# Patient Record
Sex: MALE | Race: WHITE | HISPANIC OR LATINO | Employment: FULL TIME | URBAN - METROPOLITAN AREA
[De-identification: names, ages, dates, MRNs, and addresses within clinical notes are randomized per-mention and may not be internally consistent; named-entity substitution may affect disease eponyms.]

---

## 2018-11-22 ENCOUNTER — HOSPITAL ENCOUNTER (OUTPATIENT)
Dept: RADIOLOGY | Facility: MEDICAL CENTER | Age: 63
End: 2018-11-22

## 2018-11-22 ENCOUNTER — HOSPITAL ENCOUNTER (INPATIENT)
Facility: MEDICAL CENTER | Age: 63
LOS: 1 days | DRG: 069 | End: 2018-11-23
Attending: EMERGENCY MEDICINE | Admitting: FAMILY MEDICINE
Payer: COMMERCIAL

## 2018-11-22 ENCOUNTER — APPOINTMENT (OUTPATIENT)
Dept: RADIOLOGY | Facility: MEDICAL CENTER | Age: 63
DRG: 069 | End: 2018-11-22
Attending: EMERGENCY MEDICINE
Payer: COMMERCIAL

## 2018-11-22 PROBLEM — I10 HTN (HYPERTENSION): Status: ACTIVE | Noted: 2018-11-22

## 2018-11-22 PROBLEM — R27.0 ATAXIA: Status: ACTIVE | Noted: 2018-11-22

## 2018-11-22 LAB
ANION GAP SERPL CALC-SCNC: 4 MMOL/L (ref 0–11.9)
APTT PPP: 35.7 SEC (ref 24.7–36)
BASOPHILS # BLD AUTO: 0.7 % (ref 0–1.8)
BASOPHILS # BLD: 0.06 K/UL (ref 0–0.12)
BUN SERPL-MCNC: 12 MG/DL (ref 8–22)
CALCIUM SERPL-MCNC: 8.3 MG/DL (ref 8.5–10.5)
CHLORIDE SERPL-SCNC: 106 MMOL/L (ref 96–112)
CO2 SERPL-SCNC: 25 MMOL/L (ref 20–33)
CREAT SERPL-MCNC: 0.88 MG/DL (ref 0.5–1.4)
EOSINOPHIL # BLD AUTO: 0.05 K/UL (ref 0–0.51)
EOSINOPHIL NFR BLD: 0.6 % (ref 0–6.9)
ERYTHROCYTE [DISTWIDTH] IN BLOOD BY AUTOMATED COUNT: 46.8 FL (ref 35.9–50)
GLUCOSE BLD-MCNC: 88 MG/DL (ref 65–99)
GLUCOSE BLD-MCNC: <10 MG/DL (ref 65–99)
GLUCOSE SERPL-MCNC: 104 MG/DL (ref 65–99)
HCT VFR BLD AUTO: 45.6 % (ref 42–52)
HGB BLD-MCNC: 15.2 G/DL (ref 14–18)
IMM GRANULOCYTES # BLD AUTO: 0.04 K/UL (ref 0–0.11)
IMM GRANULOCYTES NFR BLD AUTO: 0.5 % (ref 0–0.9)
INR PPP: 1.3 (ref 0.87–1.13)
LYMPHOCYTES # BLD AUTO: 2.1 K/UL (ref 1–4.8)
LYMPHOCYTES NFR BLD: 24.6 % (ref 22–41)
MCH RBC QN AUTO: 31.7 PG (ref 27–33)
MCHC RBC AUTO-ENTMCNC: 33.3 G/DL (ref 33.7–35.3)
MCV RBC AUTO: 95 FL (ref 81.4–97.8)
MONOCYTES # BLD AUTO: 0.4 K/UL (ref 0–0.85)
MONOCYTES NFR BLD AUTO: 4.7 % (ref 0–13.4)
NEUTROPHILS # BLD AUTO: 5.88 K/UL (ref 1.82–7.42)
NEUTROPHILS NFR BLD: 68.9 % (ref 44–72)
NRBC # BLD AUTO: 0 K/UL
NRBC BLD-RTO: 0 /100 WBC
PLATELET # BLD AUTO: 193 K/UL (ref 164–446)
PMV BLD AUTO: 11.7 FL (ref 9–12.9)
POTASSIUM SERPL-SCNC: 4.7 MMOL/L (ref 3.6–5.5)
PROTHROMBIN TIME: 16.3 SEC (ref 12–14.6)
RBC # BLD AUTO: 4.8 M/UL (ref 4.7–6.1)
SODIUM SERPL-SCNC: 135 MMOL/L (ref 135–145)
WBC # BLD AUTO: 8.5 K/UL (ref 4.8–10.8)

## 2018-11-22 PROCEDURE — 70498 CT ANGIOGRAPHY NECK: CPT

## 2018-11-22 PROCEDURE — 82962 GLUCOSE BLOOD TEST: CPT | Mod: 91

## 2018-11-22 PROCEDURE — 99285 EMERGENCY DEPT VISIT HI MDM: CPT

## 2018-11-22 PROCEDURE — 85610 PROTHROMBIN TIME: CPT

## 2018-11-22 PROCEDURE — 85730 THROMBOPLASTIN TIME PARTIAL: CPT

## 2018-11-22 PROCEDURE — 99223 1ST HOSP IP/OBS HIGH 75: CPT | Performed by: FAMILY MEDICINE

## 2018-11-22 PROCEDURE — 700117 HCHG RX CONTRAST REV CODE 255: Performed by: EMERGENCY MEDICINE

## 2018-11-22 PROCEDURE — 0042T CT-CEREBRAL PERFUSION ANALYSIS: CPT

## 2018-11-22 PROCEDURE — 85025 COMPLETE CBC W/AUTO DIFF WBC: CPT

## 2018-11-22 PROCEDURE — 93005 ELECTROCARDIOGRAM TRACING: CPT

## 2018-11-22 PROCEDURE — 70496 CT ANGIOGRAPHY HEAD: CPT

## 2018-11-22 PROCEDURE — 770020 HCHG ROOM/CARE - TELE (206)

## 2018-11-22 PROCEDURE — 80048 BASIC METABOLIC PNL TOTAL CA: CPT

## 2018-11-22 PROCEDURE — 36415 COLL VENOUS BLD VENIPUNCTURE: CPT

## 2018-11-22 RX ORDER — POLYETHYLENE GLYCOL 3350 17 G/17G
1 POWDER, FOR SOLUTION ORAL
Status: DISCONTINUED | OUTPATIENT
Start: 2018-11-22 | End: 2018-11-23 | Stop reason: HOSPADM

## 2018-11-22 RX ORDER — TAMSULOSIN HYDROCHLORIDE 0.4 MG/1
0.4 CAPSULE ORAL
Status: DISCONTINUED | OUTPATIENT
Start: 2018-11-23 | End: 2018-11-23 | Stop reason: HOSPADM

## 2018-11-22 RX ORDER — AMOXICILLIN 250 MG
2 CAPSULE ORAL 2 TIMES DAILY
Status: DISCONTINUED | OUTPATIENT
Start: 2018-11-22 | End: 2018-11-23 | Stop reason: HOSPADM

## 2018-11-22 RX ORDER — TAMSULOSIN HYDROCHLORIDE 0.4 MG/1
0.8 CAPSULE ORAL EVERY EVENING
COMMUNITY
End: 2021-06-30

## 2018-11-22 RX ORDER — BISACODYL 10 MG
10 SUPPOSITORY, RECTAL RECTAL
Status: DISCONTINUED | OUTPATIENT
Start: 2018-11-22 | End: 2018-11-23 | Stop reason: HOSPADM

## 2018-11-22 RX ORDER — HYDRALAZINE HYDROCHLORIDE 20 MG/ML
10 INJECTION INTRAMUSCULAR; INTRAVENOUS EVERY 6 HOURS PRN
Status: DISCONTINUED | OUTPATIENT
Start: 2018-11-22 | End: 2018-11-23 | Stop reason: HOSPADM

## 2018-11-22 RX ORDER — ATORVASTATIN CALCIUM 80 MG/1
80 TABLET, FILM COATED ORAL EVERY EVENING
Status: DISCONTINUED | OUTPATIENT
Start: 2018-11-22 | End: 2018-11-23 | Stop reason: HOSPADM

## 2018-11-22 RX ORDER — ASPIRIN 325 MG
325 TABLET ORAL DAILY
Status: DISCONTINUED | OUTPATIENT
Start: 2018-11-22 | End: 2018-11-23 | Stop reason: HOSPADM

## 2018-11-22 RX ADMIN — IOHEXOL 120 ML: 350 INJECTION, SOLUTION INTRAVENOUS at 20:46

## 2018-11-23 ENCOUNTER — APPOINTMENT (OUTPATIENT)
Dept: CARDIOLOGY | Facility: MEDICAL CENTER | Age: 63
DRG: 069 | End: 2018-11-23
Attending: INTERNAL MEDICINE
Payer: COMMERCIAL

## 2018-11-23 ENCOUNTER — APPOINTMENT (OUTPATIENT)
Dept: RADIOLOGY | Facility: MEDICAL CENTER | Age: 63
DRG: 069 | End: 2018-11-23
Attending: FAMILY MEDICINE
Payer: COMMERCIAL

## 2018-11-23 ENCOUNTER — PATIENT OUTREACH (OUTPATIENT)
Dept: HEALTH INFORMATION MANAGEMENT | Facility: OTHER | Age: 63
End: 2018-11-23

## 2018-11-23 VITALS
HEIGHT: 67 IN | OXYGEN SATURATION: 100 % | HEART RATE: 59 BPM | WEIGHT: 158.51 LBS | SYSTOLIC BLOOD PRESSURE: 134 MMHG | BODY MASS INDEX: 24.88 KG/M2 | RESPIRATION RATE: 18 BRPM | DIASTOLIC BLOOD PRESSURE: 54 MMHG | TEMPERATURE: 97.8 F

## 2018-11-23 PROBLEM — R27.0 ATAXIA: Status: RESOLVED | Noted: 2018-11-22 | Resolved: 2018-11-23

## 2018-11-23 LAB
ALBUMIN SERPL BCP-MCNC: 3.7 G/DL (ref 3.2–4.9)
ALBUMIN/GLOB SERPL: 1.8 G/DL
ALP SERPL-CCNC: 65 U/L (ref 30–99)
ALT SERPL-CCNC: 9 U/L (ref 2–50)
ANION GAP SERPL CALC-SCNC: 6 MMOL/L (ref 0–11.9)
AST SERPL-CCNC: 9 U/L (ref 12–45)
BASOPHILS # BLD AUTO: 0.7 % (ref 0–1.8)
BASOPHILS # BLD: 0.05 K/UL (ref 0–0.12)
BILIRUB SERPL-MCNC: 0.6 MG/DL (ref 0.1–1.5)
BUN SERPL-MCNC: 12 MG/DL (ref 8–22)
CALCIUM SERPL-MCNC: 8.7 MG/DL (ref 8.5–10.5)
CHLORIDE SERPL-SCNC: 107 MMOL/L (ref 96–112)
CHOLEST SERPL-MCNC: 158 MG/DL (ref 100–199)
CO2 SERPL-SCNC: 26 MMOL/L (ref 20–33)
CREAT SERPL-MCNC: 0.92 MG/DL (ref 0.5–1.4)
EOSINOPHIL # BLD AUTO: 0.19 K/UL (ref 0–0.51)
EOSINOPHIL NFR BLD: 2.7 % (ref 0–6.9)
ERYTHROCYTE [DISTWIDTH] IN BLOOD BY AUTOMATED COUNT: 48.1 FL (ref 35.9–50)
EST. AVERAGE GLUCOSE BLD GHB EST-MCNC: 120 MG/DL
FOLATE SERPL-MCNC: 10.5 NG/ML
GLOBULIN SER CALC-MCNC: 2.1 G/DL (ref 1.9–3.5)
GLUCOSE SERPL-MCNC: 102 MG/DL (ref 65–99)
HBA1C MFR BLD: 5.8 % (ref 0–5.6)
HCT VFR BLD AUTO: 43.7 % (ref 42–52)
HDLC SERPL-MCNC: 43 MG/DL
HGB BLD-MCNC: 14.4 G/DL (ref 14–18)
IMM GRANULOCYTES # BLD AUTO: 0.02 K/UL (ref 0–0.11)
IMM GRANULOCYTES NFR BLD AUTO: 0.3 % (ref 0–0.9)
LDLC SERPL CALC-MCNC: 94 MG/DL
LV EJECT FRACT  99904: 70
LV EJECT FRACT MOD 2C 99903: 75.51
LV EJECT FRACT MOD 4C 99902: 87.15
LV EJECT FRACT MOD BP 99901: 82.45
LYMPHOCYTES # BLD AUTO: 2.61 K/UL (ref 1–4.8)
LYMPHOCYTES NFR BLD: 36.9 % (ref 22–41)
MCH RBC QN AUTO: 32 PG (ref 27–33)
MCHC RBC AUTO-ENTMCNC: 33 G/DL (ref 33.7–35.3)
MCV RBC AUTO: 97.1 FL (ref 81.4–97.8)
MONOCYTES # BLD AUTO: 0.58 K/UL (ref 0–0.85)
MONOCYTES NFR BLD AUTO: 8.2 % (ref 0–13.4)
NEUTROPHILS # BLD AUTO: 3.63 K/UL (ref 1.82–7.42)
NEUTROPHILS NFR BLD: 51.2 % (ref 44–72)
NRBC # BLD AUTO: 0 K/UL
NRBC BLD-RTO: 0 /100 WBC
PLATELET # BLD AUTO: 173 K/UL (ref 164–446)
PMV BLD AUTO: 11.2 FL (ref 9–12.9)
POTASSIUM SERPL-SCNC: 4.3 MMOL/L (ref 3.6–5.5)
PROT SERPL-MCNC: 5.8 G/DL (ref 6–8.2)
RBC # BLD AUTO: 4.5 M/UL (ref 4.7–6.1)
SODIUM SERPL-SCNC: 139 MMOL/L (ref 135–145)
TRIGL SERPL-MCNC: 105 MG/DL (ref 0–149)
VIT B12 SERPL-MCNC: 259 PG/ML (ref 211–911)
WBC # BLD AUTO: 7.1 K/UL (ref 4.8–10.8)

## 2018-11-23 PROCEDURE — A9270 NON-COVERED ITEM OR SERVICE: HCPCS | Performed by: FAMILY MEDICINE

## 2018-11-23 PROCEDURE — A9585 GADOBUTROL INJECTION: HCPCS | Performed by: FAMILY MEDICINE

## 2018-11-23 PROCEDURE — 700117 HCHG RX CONTRAST REV CODE 255: Performed by: FAMILY MEDICINE

## 2018-11-23 PROCEDURE — 3E02340 INTRODUCTION OF INFLUENZA VACCINE INTO MUSCLE, PERCUTANEOUS APPROACH: ICD-10-PCS | Performed by: FAMILY MEDICINE

## 2018-11-23 PROCEDURE — 82607 VITAMIN B-12: CPT

## 2018-11-23 PROCEDURE — 93306 TTE W/DOPPLER COMPLETE: CPT

## 2018-11-23 PROCEDURE — G8979 MOBILITY GOAL STATUS: HCPCS | Mod: CI

## 2018-11-23 PROCEDURE — 700111 HCHG RX REV CODE 636 W/ 250 OVERRIDE (IP): Performed by: STUDENT IN AN ORGANIZED HEALTH CARE EDUCATION/TRAINING PROGRAM

## 2018-11-23 PROCEDURE — G8980 MOBILITY D/C STATUS: HCPCS | Mod: CI

## 2018-11-23 PROCEDURE — 97161 PT EVAL LOW COMPLEX 20 MIN: CPT

## 2018-11-23 PROCEDURE — G8988 SELF CARE GOAL STATUS: HCPCS | Mod: CI

## 2018-11-23 PROCEDURE — 700102 HCHG RX REV CODE 250 W/ 637 OVERRIDE(OP): Performed by: FAMILY MEDICINE

## 2018-11-23 PROCEDURE — G8987 SELF CARE CURRENT STATUS: HCPCS | Mod: CI

## 2018-11-23 PROCEDURE — 90471 IMMUNIZATION ADMIN: CPT

## 2018-11-23 PROCEDURE — 36415 COLL VENOUS BLD VENIPUNCTURE: CPT

## 2018-11-23 PROCEDURE — 700111 HCHG RX REV CODE 636 W/ 250 OVERRIDE (IP): Performed by: FAMILY MEDICINE

## 2018-11-23 PROCEDURE — 3E0234Z INTRODUCTION OF SERUM, TOXOID AND VACCINE INTO MUSCLE, PERCUTANEOUS APPROACH: ICD-10-PCS | Performed by: FAMILY MEDICINE

## 2018-11-23 PROCEDURE — G8978 MOBILITY CURRENT STATUS: HCPCS | Mod: CI

## 2018-11-23 PROCEDURE — 80061 LIPID PANEL: CPT

## 2018-11-23 PROCEDURE — 99239 HOSP IP/OBS DSCHRG MGMT >30: CPT | Performed by: INTERNAL MEDICINE

## 2018-11-23 PROCEDURE — 90670 PCV13 VACCINE IM: CPT | Performed by: FAMILY MEDICINE

## 2018-11-23 PROCEDURE — 90662 IIV NO PRSV INCREASED AG IM: CPT | Performed by: FAMILY MEDICINE

## 2018-11-23 PROCEDURE — 85025 COMPLETE CBC W/AUTO DIFF WBC: CPT

## 2018-11-23 PROCEDURE — 99221 1ST HOSP IP/OBS SF/LOW 40: CPT | Performed by: PSYCHIATRY & NEUROLOGY

## 2018-11-23 PROCEDURE — 93306 TTE W/DOPPLER COMPLETE: CPT | Mod: 26 | Performed by: INTERNAL MEDICINE

## 2018-11-23 PROCEDURE — G8989 SELF CARE D/C STATUS: HCPCS | Mod: CI

## 2018-11-23 PROCEDURE — 82746 ASSAY OF FOLIC ACID SERUM: CPT

## 2018-11-23 PROCEDURE — 97165 OT EVAL LOW COMPLEX 30 MIN: CPT

## 2018-11-23 PROCEDURE — 83036 HEMOGLOBIN GLYCOSYLATED A1C: CPT

## 2018-11-23 PROCEDURE — 80053 COMPREHEN METABOLIC PANEL: CPT

## 2018-11-23 PROCEDURE — 70553 MRI BRAIN STEM W/O & W/DYE: CPT

## 2018-11-23 RX ORDER — CYANOCOBALAMIN 1000 UG/ML
1000 INJECTION, SOLUTION INTRAMUSCULAR; SUBCUTANEOUS
Status: DISCONTINUED | OUTPATIENT
Start: 2018-11-23 | End: 2018-11-23 | Stop reason: HOSPADM

## 2018-11-23 RX ORDER — ATORVASTATIN CALCIUM 80 MG/1
80 TABLET, FILM COATED ORAL EVERY EVENING
Qty: 30 TAB | Refills: 1 | Status: SHIPPED | OUTPATIENT
Start: 2018-11-23 | End: 2019-05-10

## 2018-11-23 RX ORDER — ASPIRIN 81 MG/1
81 TABLET, CHEWABLE ORAL DAILY
Qty: 30 TAB | Refills: 3 | Status: SHIPPED | OUTPATIENT
Start: 2018-11-23 | End: 2019-05-10

## 2018-11-23 RX ORDER — ATORVASTATIN CALCIUM 80 MG/1
20 TABLET, FILM COATED ORAL EVERY EVENING
Qty: 30 TAB | Refills: 1 | Status: SHIPPED | OUTPATIENT
Start: 2018-11-23 | End: 2018-11-23

## 2018-11-23 RX ORDER — CLOPIDOGREL BISULFATE 75 MG/1
75 TABLET ORAL DAILY
Qty: 21 TAB | Refills: 0 | Status: SHIPPED | OUTPATIENT
Start: 2018-11-23 | End: 2018-12-14

## 2018-11-23 RX ORDER — GADOBUTROL 604.72 MG/ML
7.5 INJECTION INTRAVENOUS ONCE
Status: COMPLETED | OUTPATIENT
Start: 2018-11-23 | End: 2018-11-23

## 2018-11-23 RX ADMIN — ATORVASTATIN CALCIUM 80 MG: 80 TABLET, FILM COATED ORAL at 17:23

## 2018-11-23 RX ADMIN — PNEUMOCOCCAL 13-VALENT CONJUGATE VACCINE 0.5 ML: 2.2; 2.2; 2.2; 2.2; 2.2; 4.4; 2.2; 2.2; 2.2; 2.2; 2.2; 2.2; 2.2 INJECTION, SUSPENSION INTRAMUSCULAR at 17:05

## 2018-11-23 RX ADMIN — CYANOCOBALAMIN 1000 MCG: 1000 INJECTION INTRAMUSCULAR; SUBCUTANEOUS at 17:05

## 2018-11-23 RX ADMIN — INFLUENZA A VIRUS A/MICHIGAN/45/2015 X-275 (H1N1) ANTIGEN (FORMALDEHYDE INACTIVATED), INFLUENZA A VIRUS A/SINGAPORE/INFIMH-16-0019/2016 IVR-186 (H3N2) ANTIGEN (FORMALDEHYDE INACTIVATED), AND INFLUENZA B VIRUS B/MARYLAND/15/2016 BX-69A (A B/COLORADO/6/2017-LIKE VIRUS) ANTIGEN (FORMALDEHYDE INACTIVATED) 0.5 ML: 60; 60; 60 INJECTION, SUSPENSION INTRAMUSCULAR at 17:07

## 2018-11-23 RX ADMIN — TAMSULOSIN HYDROCHLORIDE 0.4 MG: 0.4 CAPSULE ORAL at 08:53

## 2018-11-23 RX ADMIN — ASPIRIN 325 MG: 325 TABLET, COATED ORAL at 00:36

## 2018-11-23 RX ADMIN — GADOBUTROL 7.5 ML: 604.72 INJECTION INTRAVENOUS at 09:44

## 2018-11-23 RX ADMIN — ATORVASTATIN CALCIUM 80 MG: 80 TABLET, FILM COATED ORAL at 00:36

## 2018-11-23 ASSESSMENT — PAIN SCALES - GENERAL
PAINLEVEL_OUTOF10: 0

## 2018-11-23 ASSESSMENT — COGNITIVE AND FUNCTIONAL STATUS - GENERAL
DAILY ACTIVITIY SCORE: 24
SUGGESTED CMS G CODE MODIFIER MOBILITY: CI
SUGGESTED CMS G CODE MODIFIER MOBILITY: CI
CLIMB 3 TO 5 STEPS WITH RAILING: A LITTLE
SUGGESTED CMS G CODE MODIFIER DAILY ACTIVITY: CH
SUGGESTED CMS G CODE MODIFIER DAILY ACTIVITY: CH
MOBILITY SCORE: 23
CLIMB 3 TO 5 STEPS WITH RAILING: A LITTLE
DAILY ACTIVITIY SCORE: 24
MOBILITY SCORE: 23

## 2018-11-23 ASSESSMENT — LIFESTYLE VARIABLES
TOTAL SCORE: 1
HAVE PEOPLE ANNOYED YOU BY CRITICIZING YOUR DRINKING: NO
ON A TYPICAL DAY WHEN YOU DRINK ALCOHOL HOW MANY DRINKS DO YOU HAVE: 3
ALCOHOL_USE: YES
HAVE YOU EVER FELT YOU SHOULD CUT DOWN ON YOUR DRINKING: YES
CONSUMPTION TOTAL: NEGATIVE
EVER FELT BAD OR GUILTY ABOUT YOUR DRINKING: NO
TOTAL SCORE: 1
HOW MANY TIMES IN THE PAST YEAR HAVE YOU HAD 5 OR MORE DRINKS IN A DAY: 0
EVER HAD A DRINK FIRST THING IN THE MORNING TO STEADY YOUR NERVES TO GET RID OF A HANGOVER: NO
AVERAGE NUMBER OF DAYS PER WEEK YOU HAVE A DRINK CONTAINING ALCOHOL: 1
EVER_SMOKED: YES
TOTAL SCORE: 1

## 2018-11-23 ASSESSMENT — COPD QUESTIONNAIRES
DURING THE PAST 4 WEEKS HOW MUCH DID YOU FEEL SHORT OF BREATH: NONE/LITTLE OF THE TIME
IN THE PAST 12 MONTHS DO YOU DO LESS THAN YOU USED TO BECAUSE OF YOUR BREATHING PROBLEMS: DISAGREE/UNSURE
HAVE YOU SMOKED AT LEAST 100 CIGARETTES IN YOUR ENTIRE LIFE: YES
COPD SCREENING SCORE: 4
DO YOU EVER COUGH UP ANY MUCUS OR PHLEGM?: NO/ONLY WITH OCCASIONAL COLDS OR INFECTIONS

## 2018-11-23 ASSESSMENT — GAIT ASSESSMENTS
DISTANCE (FEET): 200
GAIT LEVEL OF ASSIST: SUPERVISED

## 2018-11-23 ASSESSMENT — PATIENT HEALTH QUESTIONNAIRE - PHQ9
SUM OF ALL RESPONSES TO PHQ9 QUESTIONS 1 AND 2: 0
1. LITTLE INTEREST OR PLEASURE IN DOING THINGS: NOT AT ALL
SUM OF ALL RESPONSES TO PHQ9 QUESTIONS 1 AND 2: 0
2. FEELING DOWN, DEPRESSED, IRRITABLE, OR HOPELESS: NOT AT ALL
2. FEELING DOWN, DEPRESSED, IRRITABLE, OR HOPELESS: NOT AT ALL

## 2018-11-23 ASSESSMENT — ACTIVITIES OF DAILY LIVING (ADL): TOILETING: INDEPENDENT

## 2018-11-23 NOTE — ED NOTES
Med Rec Updated and Complete per Pt at bedside  Allergies Reviewed  No PO ABX last 30 days    Pt states he is only taking Flomax at this time.

## 2018-11-23 NOTE — CONSULTS
"Date of Admission: 11/22/2018    Neurology Consult Note     Reason for consultation: Ataxia     HPI:  Mr. Sheldon is a very pleasant 64 yo old male with PMHx of BPH on flomax who presents to ED as a transfer from an outside hospital for ataxia. Per patient he started to develop unsteady gait and tendency to fall/sway to the left yesterday morning (11/22), he had associated dizziness described as \"feeling like the ground was moving underneath him\" he denies a spinning sensation or a feeling of lightheadedness, this continued most of the day for which he then presented to the ED. He does note that this has happened several times over the last year but is usually very transient and does not last more than a few minutes. He denies any headaches, vision changes, changes in his hearing (although notes poor hearing), sensation of ear fullness or ringing. He denies chest pain, palpitations, loss of motor or sensory.     S: Mr Sheldon is feeling back to his baseline today, denies any further ataxia or unsteadiness, was able to ambulate to the restroom this morning without issue.     O- Vitals stable, although mildly bradycardic. Labs have been relatively unremarkable. CTA head/neck, CT head all unremarkable without evidence of  Significant stenosis, hemorrhage, areas of ischemia or infarct.       Allergies: No Known Allergies      Current Facility-Administered Medications:   •  Influenza Vaccine High-Dose pf injection 0.5 mL, 0.5 mL, Intramuscular, Once PRN, Jimmie Aly M.D.  •  pneumococcal 13-Leidy Conj Vacc (PREVNAR 13) syringe 0.5 mL, 0.5 mL, Intramuscular, Once PRN, Jimmie Aly M.D.  •  tamsulosin (FLOMAX) capsule 0.4 mg, 0.4 mg, Oral, AFTER BREAKFAST, Jimmie Aly M.D., 0.4 mg at 11/23/18 0853  •  senna-docusate (PERICOLACE or SENOKOT S) 8.6-50 MG per tablet 2 Tab, 2 Tab, Oral, BID, Stopped at 11/23/18 0830 **AND** polyethylene glycol/lytes (MIRALAX) PACKET 1 Packet, 1 Packet, Oral, QDAY " PRN **AND** magnesium hydroxide (MILK OF MAGNESIA) suspension 30 mL, 30 mL, Oral, QDAY PRN **AND** bisacodyl (DULCOLAX) suppository 10 mg, 10 mg, Rectal, QDAY PRN, Jimmie Aly M.D.  •  aspirin (ASA) tablet 325 mg, 325 mg, Oral, DAILY, Jimmie Aly M.D., 325 mg at 11/23/18 0036  •  atorvastatin (LIPITOR) tablet 80 mg, 80 mg, Oral, Q EVENING, Jimmie Aly M.D., 80 mg at 11/23/18 0036  •  hydrALAZINE (APRESOLINE) injection 10 mg, 10 mg, Intravenous, Q6HRS PRN, Jimmie Aly M.D.      PHYSICAL EXAM    Vitals:    11/22/18 2230 11/22/18 2320 11/23/18 0024 11/23/18 0446   BP:   134/61 123/57   Pulse: (!) 54 (!) 59 (!) 57 (!) 52   Resp: 15 15 16 16   Temp:   36.8 °C (98.3 °F) 37.1 °C (98.7 °F)   TempSrc:   Temporal Temporal   SpO2: 93% 96% 97% 96%   Weight:   71.9 kg (158 lb 8.2 oz)    Height:         General: Non ill appearing male, sitting up eating breakfast, resting comfortably    Head/Neck: NCAT. no meningismus neg kernig neg brudzinski. No bruits.     Skin: Warm, dry, intact. No rashes observed head/neck or body      Mental Status: Awake, alert, oriented x 3. Name/repeat/fluent/command follow intact. No neglect/extinction. Attention and concentration, recent & remote memory, Fund of Knowledge wnl.     Cranial Nerves:  CN II-XII intact. PERRL.   No afferent pupillary defect. EOM full. VF full. No nystagmus.    Motor:  bulk & tone wnl. strength intact. no abn mvmts.       Sensory: symmetric to sharp, decreased proprioception in bilateral feet     Coordination: no dysmetria or dysdiadochokinesia     DTR's: intact/sym. no clonus. Toes mute.    Gait/Station: normal rise from head, normal gait      Labs:  Recent Labs      11/22/18 2024 11/23/18   0209   WBC  8.5  7.1   RBC  4.80  4.50*   HEMOGLOBIN  15.2  14.4   HEMATOCRIT  45.6  43.7   MCV  95.0  97.1   MCH  31.7  32.0   MCHC  33.3*  33.0*   RDW  46.8  48.1   PLATELETCT  193  173   MPV  11.7  11.2     Recent Labs      11/22/18   2100   11/23/18   0209   SODIUM  135  139   POTASSIUM  4.7  4.3   CHLORIDE  106  107   CO2  25  26   GLUCOSE  104*  102*   BUN  12  12   CREATININE  0.88  0.92   CALCIUM  8.3*  8.7     Recent Labs      11/22/18 2051   APTT  35.7   INR  1.30*             Recent Labs      11/23/18 0209   TRIGLYCERIDE  105   HDL  43   LDL  94     Recent Labs      11/22/18   2100  11/23/18 0209   SODIUM  135  139   POTASSIUM  4.7  4.3   CHLORIDE  106  107   CO2  25  26   GLUCOSE  104*  102*   BUN  12  12     Recent Labs      11/22/18   2100  11/23/18   0209   SODIUM  135  139   POTASSIUM  4.7  4.3   CHLORIDE  106  107   CO2  25  26   BUN  12  12   CREATININE  0.88  0.92   CALCIUM  8.3*  8.7     Recent Labs      11/22/18 2051   APTT  35.7   INR  1.30*     No results found for this or any previous visit.           Imaging: neuroimaging reviewed and directly visualized by me  CT-CTA NECK WITH & W/O-POST PROCESSING   Final Result      Unremarkable CT angiogram of the neck. No high-grade stenosis, large vessel occlusion, aneurysm or dissection.      CT-CTA HEAD WITH & W/O-POST PROCESS   Final Result      1.  No large vessel occlusion, high-grade stenosis or aneurysm of the [place] of [place].   2.  No CT evidence of acute infarct, hemorrhage or mass.      CT-CEREBRAL PERFUSION ANALYSIS   Final Result      1.  Cerebral blood flow less than 30% likely representing completed infarct = 0 mL.      2.  T Max more than 6 seconds likely representing combination of completed infarct and ischemia = 0 mL.      3.  Mismatched volume likely representing ischemic brain/penumbra = 0 mL.      4.  Please note that the cerebral perfusion was performed on the limited brain tissue around the basal ganglia region. Infarct/ischemia outside the CT perfusion sections can be missed in this study.      OUTSIDE IMAGES-CT HEAD   Final Result      OUTSIDE IMAGES-DX CHEST   Final Result      MR-BRAIN-WITH & W/O    (Results Pending)   EC-ECHOCARDIOGRAM COMPLETE W/ CONT     (Results Pending)       Assessment/Plan:    Mr. Sheldon is a 64 yo male with no significant PMHx aside from BPH who was admitted for ataxia concerning for possible stroke. CT head and CTA head/neck was negative for hemorrhage, ischemic or mass, MRI was completed and negative for evidence of CVA. Symptoms of ataxia possibly secondary to transient ischemic attack (TIA) vs peripheral neuropathy vs vertigo.     1. Vitamin B12 low normal at 259, recommend supplemention with IM Vit B-12 while inpatient, consider oral supplementation on discharge   2. Consider use of pharmacological therapy such as meclizine, metoclopramide for symptomatic control   3. Recommend vestibular rehabilitation if symptoms continue   4. slightly elevated glycohemoglobin at 5.8,  on healthy diet   5. ASCVD score 17.5%, statin therapy recommended, recommend to start moderate/high intensity statin, daily aspirin   6. Recommend 30 day cardiac monitor on discharge to evaluate for arrhythmia         Luisa Chowdhury M.D.  PGY2 Internal Medicine   [employer] [facility] [employer]

## 2018-11-23 NOTE — PROGRESS NOTES
Bedside report received. Pt A&Ox4. No complaints of pain or SOB. RA. VSS. POC discussed with pt. Pt verbalizes understanding. Call light and belongings within reach. Bed locked and in lowest position. Fall precautions in place.

## 2018-11-23 NOTE — ED NOTES
Bedside report to receiving RN for T8. Questions answered. All belongings accounted for at transfer.

## 2018-11-23 NOTE — CARE PLAN
Problem: Communication  Goal: The ability to communicate needs accurately and effectively will improve  Outcome: PROGRESSING AS EXPECTED  Patient has been oriented to call light system and has been able to communicate his needs accurately and effectively during the shift.    Problem: Safety  Goal: Will remain free from falls  Outcome: PROGRESSING AS EXPECTED  The patient has been assessed for fall risks and has remained free from falls during shift.

## 2018-11-23 NOTE — ED TRIAGE NOTES
"Pt BIB EMS from [facility] as a transfer for probable vertigo and need of an MRI. Pt had feelings of weakness around 1000 this morning and L sided lateral pain on his rib area. Pt states \" I wasn't feeling well\". Pt is A&O4 with VSS. Pt feels dizzy and very weak, upon arrival pt ambulated to restroom with one assist with shaky gait. Head CT normal at outside facility.   "

## 2018-11-23 NOTE — ED NOTES
Pt sleeping soundly on side in room. Even and unlabored respirations noted. Pt awaiting placement upstairs.

## 2018-11-23 NOTE — H&P
DATE OF ADMISSION:  11/22/2018    HISTORY OF PRESENT ILLNESS:  This is a 63-year-old male who was transferred   from an outside facility to here today with a complaint of unsteady gait.  The   patient states this started in the morning.  The patient states that he   noticed that he had a tendency to fall towards the left.  Patient states that   he seems to be getting worse, decided to go to the local emergency room.  At   the local emergency room, there was a question of vertigo.  Patient was   transferred to the hospital here.  Patient also denies any slurring of his   speech and denies any weakness in the upper or lower extremities.  However, he   states that whenever he tries to walk, he has a tendency to fall towards the   left and he feels he is like a drunk person.  Patient also denies any fever.    Denies any chills.  Denies any chest pain.  Denies any shortness of breath.    PAST MEDICAL HISTORY:  Benign prostatic hypertrophy.    MEDICATIONS:  Flomax 0.4 mg p.o. daily.    SOCIAL HISTORY:  Admits to tobacco use and also occasional alcohol.  Denies   any drug use.    FAMILY HISTORY:  Reviewed, nonsignificant.    REVIEW OF SYSTEMS:  All 14 systems reviewed, all of them are negative except   what I mentioned in the history of present illness.    PHYSICAL EXAMINATION:  VITAL SIGNS:  O2 saturation of 98%, blood pressure of 164/66, respirations of   14, heart rate of 67.  GENERAL APPEARANCE:  Patient is awake, alert, oriented x3.  NEUROLOGIC:  Cranial nerves II-XII appear to be intact.  HEAD AND NECK:  Supple.  Lips are dry.  No jugular venous distention noted.  CARDIOVASCULAR:  S1, S2, regular.  LUNGS:  Decreased breath sounds, otherwise clear.  ABDOMEN:  Soft, nontender.  NEUROLOGIC:  Upper extremity motor and sensory intact.  Lower extremity motor   and sensory intact.  Patient does have gait dysfunction and tends to fall   towards the left when he stands up.    LABORATORY DATA:  WBC of 8.5, H and H of 15.2 and  45.6, platelets 193,000.    Sodium is 135, potassium is 4.7, chloride 106, bicarb 25, glucose of 104, BUN   of 12, creatinine 0.88.    EKG is done and shows patient is in sinus rhythm, normal EKG, rate of 53 with   sinus bradycardia.  The patient also had a CT cerebral perfusion that showed   impression is:  1.  Cerebral blood flow less than 30%, likely representing completed infarct.  2.  T-max more than 6 seconds, likely representing combination of completed   infarct and ischemia.  3.  Mismatched volume likely representing ischemic brain/penumbra = 0.  4.  Please note that the cerebral perfusion was performed on the limited brain   tissue around the basal ganglia region.  Also, CTA, impression, no large   vessel occlusion, high-grade stenosis or aneurysm of the [place] of [place], no   CT evidence of acute infarct, hemorrhage, or mass.    CTA of the neck was also done that shows impression:  Unremarkable CT   angiogram of the neck.  No high-grade stenosis of large vessel occlusion,   aneurysm or dissection.    ASSESSMENT AND PLAN:  1.  This is a 63-year-old male with ataxia with probable ischemic stroke of   the cerebellum area.  2.  Start patient on aspirin 325 mg p.o. daily, first dose now.  3.  Lipitor 80 mg p.o. daily.  4.  MRI of the head.  5.  Patient will remain on fall precautions.  6.  PT and OT.  7.  Also, permissive hypertension.  8.  For hypertension, we will begin hydralazine 10 mg IV q. 4 hours p.r.n.   systolic blood pressure above 180.  9.  For deep venous thrombosis prophylaxis, the patient is on compression   boots.       ____________________________________     Jimmie Aly MD    [facility] / [facility]    DD:  11/22/2018 22:39:29  DT:  11/23/2018 00:14:27    D#:  [id number]  Job#:  [id number]

## 2018-11-23 NOTE — ASSESSMENT & PLAN NOTE
Head ct studies are noted  Asa  lipitor  Permissive htn  Mri of the head  Fall precaution  Pt and ot

## 2018-11-23 NOTE — THERAPY
"Occupational Therapy Evaluation completed.   Functional Status:  SPV level BADLs, SBA functional mobility no AD used  Plan of Care: No further Acute OT needs noted at this time.   Discharge Recommendations:  Equipment: No Equipment Needed. Post-acute therapy Currently anticipate no further skilled therapy needs once patient is discharged from the inpatient setting.    See \"Rehab Therapy-Acute\" Patient Summary Report for complete documentation.    Pt is a 64 y/o male who presents to acute 2/2 unsteady gait. B UE's assessed, pt is R handed, R UE had strength of 4/5, and L UE had strength of 5/5. Coordination, AROM, and sensation appears to be intact. Pt reports that he feels close to his functional baseline. No further Acute OT needs noted at this time.     "

## 2018-11-23 NOTE — PROGRESS NOTES
Assumed care at 2330, bedside report received from ED RN. Pt on the monitor. Initial assessment completed, orders reviewed, call light within reach, and hourly rounding in place. POC addressed with patient, no additional questions at this time.

## 2018-11-23 NOTE — ED PROVIDER NOTES
ED Provider Note    CHIEF COMPLAINT  Chief Complaint   Patient presents with   • Sent by MD     transfer from outside facility for MRI       HPI  Adrián Sheldon is a 63 y.o. male who presents to the emergency department for chief complaint of vertigo and concern for posterior cerebellar stroke from [facility].  Patient is a tobacco user but otherwise denies taking any medications for high blood pressure cholesterol.  He states that off and on over the last few weeks he will get mild bouts of vertigo that got better on their own.  He states however this morning at about 10 AM all of a sudden his balance and coordination was completely off.  He states it never got better and only got worse so he went to the hospital this afternoon at least 6 hours after the symptoms began.  He states he still feels dizzy he has no ringing either ear no hearing loss he still states when he tries to ambulate his balance is off.  It never lasted this long before he denies any vision changes but the way he says is when he turns his head his eyes take a minute to catch up  He denies any associated speech difficulty facial weakness or limb weakness on one side or the other    REVIEW OF SYSTEMS  Positives as above. Pertinent negatives include nausea vomiting hearing loss ear ringing focal weakness numbness or tingling on one side or the other  All other review of systems are negative    PAST MEDICAL HISTORY       SOCIAL HISTORY  Social History     Social History Main Topics   • Smoking status: Current Every Day Smoker     Packs/day: 0.25   • Smokeless tobacco: Never Used   • Alcohol use Yes      Comment: moderately    • Drug use: No   • Sexual activity: Not on file       SURGICAL HISTORY  patient denies any surgical history    CURRENT MEDICATIONS  Home Medications    **Home medications have not yet been reviewed for this encounter**         ALLERGIES  No Known Allergies    PHYSICAL EXAM  VITAL SIGNS: Pulse (!) 54   Temp 36.4 °C  "(97.5 °F) (Temporal)   Resp 14   Ht 1.702 m (5' 7\")   Wt 64.4 kg (142 lb)   SpO2 97%   BMI 22.24 kg/m²    Pulse ox interpretation: I interpret this pulse ox as normal.  Constitutional: Alert in no apparent distress.  HENT: Normocephalic atraumatic, MMM  Eyes: PER, Conjunctiva normal, Non-icteric.   Neck: Normal range of motion, No tenderness, Supple, No stridor.   Lymphatic: No lymphadenopathy noted.   Cardiovascular: Regular rate and rhythm, no murmurs.   Thorax & Lungs: Normal breath sounds, No respiratory distress, No wheezing, No chest tenderness.   Abdomen: Bowel sounds normal, Soft, No tenderness, No pulsatile masses. No peritoneal signs.  Skin: Warm, Dry, No erythema, No rash.   Back: No bony tenderness, No CVA tenderness.   Extremities: Intact distal pulses, No edema, No tenderness, No cyanosis  Musculoskeletal: Good range of motion in all major joints. No tenderness to palpation or major deformities noted.   Neurologic: Alert and oriented x3, Symmetric smile, eyes shut tight bilaterally, forehead wrinkles bilaterally, sensation intact to light touch bilateral face, tongue midline, head turn and shoulder shrug with full strength. Hearing intact grossly bilaterally. 5/5  strength bilaterally, 5/5 tricep and bicep strength bilaterally. Sensation intact to light touch r, m, u, axillary nerves bilaterally. 5/5 strength quadricep, plantarflexion/dorsiflexion/extensor hallicus longus bilaterally. Sensation intact to light touch bilateral lower extremities in all nerve distributions.   Slow finger to nose b/l, slow rapid alternating movement, unstable gait listing to the R  HINTS Exam:  1. Nystagmus: rotary to the left direction  2. Test of Skew: positive   3. Head Impulse Test: negative     DIFFERENTIAL DIAGNOSIS AND WORK UP PLAN    This is a 63 y.o. male who presents with possible acute cerebellar stroke as of 10 AM this morning.  The patient had acute vertiginous symptoms as well as gait instability and " coordination issues.  He was outside the window for alteplase on arrival to [place] and at this time is still outside the window CTAs will be performed to evaluate for any cerebellar occlusion that would need to be removed.  I discussed this with the patient and he is on board    DIAGNOSTIC STUDIES / PROCEDURES    EKG  12- Lead EKG; interpreted by myself - Lexx  Normal sinus bradycardia at a rate of 53  Normal axis.  Normal intervals.   No ST elevation or depression, or abnormal T wave inversion   No widening of QRS complex   Good R wave progression   No diagnostic Q waves.   No prior  Clinical Impression:  Sinus bradycardia      LABS  Pertinent Lab Findings  CBC within normal limits BMP within normal limits INR mildly elevated at 1.3 PTT normal otherwise normal laboratory analysis      RADIOLOGY  CT-CTA NECK WITH & W/O-POST PROCESSING   Final Result      Unremarkable CT angiogram of the neck. No high-grade stenosis, large vessel occlusion, aneurysm or dissection.      CT-CTA HEAD WITH & W/O-POST PROCESS   Final Result      1.  No large vessel occlusion, high-grade stenosis or aneurysm of the [place] of [place].   2.  No CT evidence of acute infarct, hemorrhage or mass.      CT-CEREBRAL PERFUSION ANALYSIS   Final Result      1.  Cerebral blood flow less than 30% likely representing completed infarct = 0 mL.      2.  T Max more than 6 seconds likely representing combination of completed infarct and ischemia = 0 mL.      3.  Mismatched volume likely representing ischemic brain/penumbra = 0 mL.      4.  Please note that the cerebral perfusion was performed on the limited brain tissue around the basal ganglia region. Infarct/ischemia outside the CT perfusion sections can be missed in this study.      OUTSIDE IMAGES-CT HEAD   Final Result      OUTSIDE IMAGES-DX CHEST   Final Result        The radiologist's interpretation of all radiological studies have been reviewed by me.      COURSE & MEDICAL DECISION  "MAKING  Pertinent Labs & Imaging studies reviewed. 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  I commiserated and told him there went to the best we can he will be admitted for an MRI and further workup and symptom management with physical therapy.    9:13 PM  Spoke with Dr. Aly for admission and he has excepted the patient    127/50  Pulse (!) 54   Temp 36.4 °C (97.5 °F) (Temporal)   Resp 15   Ht 1.702 m (5' 7\")   Wt 64.4 kg (142 lb)   SpO2 93%   BMI 22.24 kg/m²       DISPOSITION:  Patient will be admitted to Dr Aly in guarded condition.        FINAL IMPRESSION  1. Vertigo  2. Nystagmus  3. Cerebellar cva        Electronically signed by: Fartun Hallman, 11/22/2018 8:00 PM    This dictation has been created using voice recognition software and/or scribes. The accuracy of the dictation is limited by the abilities of the software and the expertise of the scribes. I expect there may be some errors of grammar and possibly content. I made every attempt to manually correct the errors within my dictation. However, errors related to voice recognition software and/or scribes may still exist and should be interpreted within the appropriate context.    "

## 2018-11-24 NOTE — PROGRESS NOTES
TO whom this may concern,    Adrián Sheldon can return to work on Monday, 11/26/18.    Any questions, please call [phone]      Sincerely,        Marcial Bullard MD

## 2018-11-24 NOTE — THERAPY
"Physical Therapy Evaluation completed.   Bed Mobility:  Supine to Sit: Modified Independent  Transfers: Sit to Stand: Supervised  Gait: Level Of Assist: Supervised with No Equipment Needed       Plan of Care: Patient with no further skilled PT needs in the acute care setting at this time  Discharge Recommendations: Equipment: No Equipment Needed. Post-acute therapy Currently anticipate no further skilled therapy needs once patient is discharged from the inpatient setting.    Pt is a 64 yo male with diagnosis of ataxia, presented to ED with c/o dizziness and gait imbalance. Pts symptoms have resolved, no c/o dizziness with mobility, no ataxia, pt able to ambulate safely without AD. No skilled PT needs, DC PT.    See \"Rehab Therapy-Acute\" Patient Summary Report for complete documentation.     "

## 2018-11-24 NOTE — PROGRESS NOTES
Patient being discharged. Patient educated on discharge instructions and new prescriptions. Patient verbalized understanding. Follow up appt to be made with PCP, and made with neurology clinic. PIV removed, telemetry monitor checked in. Patient going home with friend. RN to call transport to escort out. Will monitor until patient is off unit.

## 2018-11-24 NOTE — DISCHARGE SUMMARY
Discharge Summary    CHIEF COMPLAINT ON ADMISSION  Chief Complaint   Patient presents with   • Sent by MD     transfer from outside facility for MRI       Reason for Admission  EMS     Admission Date  11/22/2018    CODE STATUS  Full Code    HPI & HOSPITAL COURSE  This is a 63 y.o. male here with ataxia. He was admitted to the telemetry floor and neurology was consulted. He underwent an extensive TIA work up that was unrevealing. His ataxia resolved but this certainly seemed like a TIA. He was strongly encouraged to abstain from smoking tobacco. He was placed on asa and plavix (21 days for this medication) and full dose atorvastatin. His blood pressure was well controlled. His A1c does not indicate evidence of diabetes at this time.        Therefore, he is discharged in fair and stable condition to home with close outpatient follow-up.    The patient met 2-midnight criteria for an inpatient stay at the time of discharge.    Discharge Date  11/23/18    FOLLOW UP ITEMS POST DISCHARGE  F/U with stroke clinic 1-2 weeks  F/U with PCP in 1-2 weeks    DISCHARGE DIAGNOSES  Active Problems:    HTN (hypertension) POA: Yes  Resolved Problems:    Ataxia POA: Yes      FOLLOW UP  No future appointments.  PRIMARY CARE PROVIDER  [facility]    PLEASE BE SURE TO FOLLOW UP WITH YOUR PROVIDER ONCE HOME. THANK YOU      MEDICATIONS ON DISCHARGE     Medication List      START taking these medications      Instructions   aspirin 81 MG Chew chewable tablet  Commonly known as:  ASA   Take 1 Tab by mouth every day.  Dose:  81 mg     atorvastatin 80 MG tablet  Commonly known as:  LIPITOR   Take 1 Tab by mouth every evening.  Dose:  80 mg     clopidogrel 75 MG Tabs  Commonly known as:  PLAVIX   Take 1 Tab by mouth every day for 21 days.  Dose:  75 mg        CONTINUE taking these medications      Instructions   tamsulosin 0.4 MG capsule  Commonly known as:  FLOMAX   Take 0.4 mg by mouth every evening.  Dose:  0.4 mg            Allergies  No Known  Allergies    DIET  Orders Placed This Encounter   Procedures   • Diet Order Regular     Standing Status:   Standing     Number of Occurrences:   1     Order Specific Question:   Diet:     Answer:   Regular [1]       ACTIVITY  As tolerated.  Weight bearing as tolerated    CONSULTATIONS  Neurology    PROCEDURES  EC-ECHOCARDIOGRAM COMPLETE W/O CONT   Final Result      MR-BRAIN-WITH & W/O   Final Result      1.  No acute abnormality.   2.  Mild cerebral atrophy.   3.  Minimal chronic microvascular ischemic disease.         CT-CTA NECK WITH & W/O-POST PROCESSING   Final Result      Unremarkable CT angiogram of the neck. No high-grade stenosis, large vessel occlusion, aneurysm or dissection.      CT-CTA HEAD WITH & W/O-POST PROCESS   Final Result      1.  No large vessel occlusion, high-grade stenosis or aneurysm of the [place].   2.  No CT evidence of acute infarct, hemorrhage or mass.      CT-CEREBRAL PERFUSION ANALYSIS   Final Result      1.  Cerebral blood flow less than 30% likely representing completed infarct = 0 mL.      2.  T Max more than 6 seconds likely representing combination of completed infarct and ischemia = 0 mL.      3.  Mismatched volume likely representing ischemic brain/penumbra = 0 mL.      4.  Please note that the cerebral perfusion was performed on the limited brain tissue around the basal ganglia region. Infarct/ischemia outside the CT perfusion sections can be missed in this study.      OUTSIDE IMAGES-CT HEAD   Final Result      OUTSIDE IMAGES-DX CHEST   Final Result            LABORATORY  Lab Results   Component Value Date    SODIUM 139 11/23/2018    POTASSIUM 4.3 11/23/2018    CHLORIDE 107 11/23/2018    CO2 26 11/23/2018    GLUCOSE 102 (H) 11/23/2018    BUN 12 11/23/2018    CREATININE 0.92 11/23/2018        Lab Results   Component Value Date    WBC 7.1 11/23/2018    HEMOGLOBIN 14.4 11/23/2018    HEMATOCRIT 43.7 11/23/2018    PLATELETCT 173 11/23/2018        Total time of the discharge  process exceeds 45 minutes.

## 2018-11-24 NOTE — DISCHARGE INSTRUCTIONS
Discharge Instructions    Diet low fat, low sugar, heart healthy with 9-13 servings of fruits and vegetables   Activities as tolerated   Follow ups with PCP in 7-10 days, call for appointment   No smoking, no alcohol, no caffeine   Wear seat belt in motorized vehicle   Take medications as perscribed   Keep appointments   If symptoms worsen call PCP, 911 or urgent care.    Discharged to home by car with friend. Discharged via wheelchair, hospital escort: Yes.  Special equipment needed: Not Applicable    Be sure to schedule a follow-up appointment with your primary care doctor or any specialists as instructed.     Discharge Plan:   Diet Plan: Discussed  Activity Level: Discussed  Smoking Cessation Offered: Patient Counseled  Confirmed Follow up Appointment: Patient to Call and Schedule Appointment  Confirmed Symptoms Management: Discussed  Medication Reconciliation Updated: Yes  Influenza Vaccine Indication: Indicated: 65 years and older    I understand that a diet low in cholesterol, fat, and sodium is recommended for good health. Unless I have been given specific instructions below for another diet, I accept this instruction as my diet prescription.   Other diet: Regular    Special Instructions: None    Is patient discharged on Warfarin / Coumadin?   No     Clopidogrel tablets  What is this medicine?  CLOPIDOGREL (kloh PID oh grel) helps to prevent blood clots. This medicine is used to prevent heart attack, stroke, or other vascular events in people who are at high risk.  This medicine may be used for other purposes; ask your health care provider or pharmacist if you have questions.  COMMON BRAND NAME(S): Plavix  What should I tell my health care provider before I take this medicine?  They need to know if you have any of the following conditions:  -bleeding disorders  -bleeding in the brain  -having surgery  -history of stomach bleeding  -an unusual or allergic reaction to clopidogrel, other medicines, foods, dyes,  or preservatives  -pregnant or trying to get pregnant  -breast-feeding  How should I use this medicine?  Take this medicine by mouth with a glass of water. Follow the directions on the prescription label. You may take this medicine with or without food. If it upsets your stomach, take it with food. Take your medicine at regular intervals. Do not take it more often than directed. Do not stop taking except on your doctor's advice.  A special MedGuide will be given to you by the pharmacist with each prescription and refill. Be sure to read this information carefully each time.  Talk to your pediatrician regarding the use of this medicine in children. Special care may be needed.  Overdosage: If you think you have taken too much of this medicine contact a poison control center or emergency room at once.  NOTE: This medicine is only for you. Do not share this medicine with others.  What if I miss a dose?  If you miss a dose, take it as soon as you can. If it is almost time for your next dose, take only that dose. Do not take double or extra doses.  What may interact with this medicine?  Do not take this medicine with the following medications:  -dasabuvir; ombitasvir; paritaprevir; ritonavir  -defibrotide  This medicine may also interact with the following medications:  -antiviral medicines for HIV or AIDS  -aspirin  -certain medicines for depression like citalopram, fluoxetine, fluvoxamine  -certain medicines for fungal infections like ketoconazole, fluconazole, voriconazole  -certain medicines for seizures like felbamate, oxcarbazepine, phenytoin  -certain medicines for stomach problems like cimetidine, omeprazole, esomeprazole  -certain medicines that treat or prevent blood clots like warfarin, enoxaparin, dalteparin, apixaban, dabigatran, rivaroxaban, ticlopidine  -chloramphenicol  -cilostazol  -fluvastatin  -isoniazid  -modafinil  -nicardipine  -NSAIDS, medicines for pain and inflammation, like ibuprofen or  naproxen  -quinine  -repaglinide  -tamoxifen  -tolbutamide  -topiramate  -torsemide  This list may not describe all possible interactions. Give your health care provider a list of all the medicines, herbs, non-prescription drugs, or dietary supplements you use. Also tell them if you smoke, drink alcohol, or use illegal drugs. Some items may interact with your medicine.  What should I watch for while using this medicine?  Visit your doctor or health care professional for regular check ups. Do not stop taking your medicine unless your doctor tells you to.  Notify your doctor or health care professional and seek emergency treatment if you develop breathing problems; changes in vision; chest pain; severe, sudden headache; pain, swelling, warmth in the leg; trouble speaking; sudden numbness or weakness of the face, arm or leg. These can be signs that your condition has gotten worse.  If you are going to have surgery or dental work, tell your doctor or health care professional that you are taking this medicine.  Certain genetic factors may reduce the effect of this medicine. Your doctor may use genetic tests to determine treatment.  What side effects may I notice from receiving this medicine?  Side effects that you should report to your doctor or health care professional as soon as possible:  -allergic reactions like skin rash, itching or hives, swelling of the face, lips, or tongue  -signs and symptoms of bleeding such as bloody or black, tarry stools; red or dark-brown urine; spitting up blood or brown material that looks like coffee grounds; red spots on the skin; unusual bruising or bleeding from the eye, gums, or nose  -signs and symptoms of a blood clot such as breathing problems; changes in vision; chest pain; severe, sudden headache; pain, swelling, warmth in the leg; trouble speaking; sudden numbness or weakness of the face, arm or leg  Side effects that usually do not require medical attention (report to your  doctor or health care professional if they continue or are bothersome):  -constipation  -diarrhea  -headache  -upset stomach  This list may not describe all possible side effects. Call your doctor for medical advice about side effects. You may report side effects to FDA at 8-054-FDA-4794.  Where should I keep my medicine?  Keep out of the reach of children.  Store at room temperature of 59 to 86 degrees F (15 to 30 degrees C). Throw away any unused medicine after the expiration date.  NOTE: This sheet is a summary. It may not cover all possible information. If you have questions about this medicine, talk to your doctor, pharmacist, or health care provider.  © 2018 [employer] (2016-09-22 10:00:44)      Atorvastatin tablets  What is this medicine?  ATORVASTATIN (a TORE va sta tin) is known as a HMG-CoA reductase inhibitor or 'statin'. It lowers the level of cholesterol and triglycerides in the blood. This drug may also reduce the risk of heart attack, stroke, or other health problems in patients with risk factors for heart disease. Diet and lifestyle changes are often used with this drug.  This medicine may be used for other purposes; ask your health care provider or pharmacist if you have questions.  COMMON BRAND NAME(S): Lipitor  What should I tell my health care provider before I take this medicine?  They need to know if you have any of these conditions:  -frequently drink alcoholic beverages  -history of stroke, TIA  -kidney disease  -liver disease  -muscle aches or weakness  -other medical condition  -an unusual or allergic reaction to atorvastatin, other medicines, foods, dyes, or preservatives  -pregnant or trying to get pregnant  -breast-feeding  How should I use this medicine?  Take this medicine by mouth with a glass of water. Follow the directions on the prescription label. You can take this medicine with or without food. Take your doses at regular intervals. Do not take your medicine more often  than directed.  Talk to your pediatrician regarding the use of this medicine in children. While this drug may be prescribed for children as young as 10 years old for selected conditions, precautions do apply.  Overdosage: If you think you have taken too much of this medicine contact a poison control center or emergency room at once.  NOTE: This medicine is only for you. Do not share this medicine with others.  What if I miss a dose?  If you miss a dose, take it as soon as you can. If it is almost time for your next dose, take only that dose. Do not take double or extra doses.  What may interact with this medicine?  Do not take this medicine with any of the following medications:  -red yeast rice  -telaprevir  -telithromycin  -voriconazole  This medicine may also interact with the following medications:  -alcohol  -antiviral medicines for HIV or AIDS  -boceprevir  -certain antibiotics like clarithromycin, erythromycin, troleandomycin  -certain medicines for cholesterol like fenofibrate or gemfibrozil  -cimetidine  -clarithromycin  -colchicine  -cyclosporine  -digoxin  -female hormones, like estrogens or progestins and birth control pills  -grapefruit juice  -medicines for fungal infections like fluconazole, itraconazole, ketoconazole  -niacin  -rifampin  -spironolactone  This list may not describe all possible interactions. Give your health care provider a list of all the medicines, herbs, non-prescription drugs, or dietary supplements you use. Also tell them if you smoke, drink alcohol, or use illegal drugs. Some items may interact with your medicine.  What should I watch for while using this medicine?  Visit your doctor or health care professional for regular check-ups. You may need regular tests to make sure your liver is working properly.  Tell your doctor or health care professional right away if you get any unexplained muscle pain, tenderness, or weakness, especially if you also have a fever and tiredness. Your  doctor or health care professional may tell you to stop taking this medicine if you develop muscle problems. If your muscle problems do not go away after stopping this medicine, contact your health care professional.  This drug is only part of a total heart-health program. Your doctor or a dietician can suggest a low-cholesterol and low-fat diet to help. Avoid alcohol and smoking, and keep a proper exercise schedule.  Do not use this drug if you are pregnant or breast-feeding. Serious side effects to an unborn child or to an infant are possible. Talk to your doctor or pharmacist for more information.  This medicine may affect blood sugar levels. If you have diabetes, check with your doctor or health care professional before you change your diet or the dose of your diabetic medicine.  If you are going to have surgery tell your health care professional that you are taking this drug.  What side effects may I notice from receiving this medicine?  Side effects that you should report to your doctor or health care professional as soon as possible:  -allergic reactions like skin rash, itching or hives, swelling of the face, lips, or tongue  -dark urine  -fever  -joint pain  -muscle cramps, pain  -redness, blistering, peeling or loosening of the skin, including inside the mouth  -trouble passing urine or change in the amount of urine  -unusually weak or tired  -yellowing of eyes or skin  Side effects that usually do not require medical attention (report to your doctor or health care professional if they continue or are bothersome):  -constipation  -heartburn  -stomach gas, pain, upset  This list may not describe all possible side effects. Call your doctor for medical advice about side effects. You may report side effects to FDA at 8-450-FDA-7499.  Where should I keep my medicine?  Keep out of the reach of children.  Store at room temperature between 20 to 25 degrees C (68 to 77 degrees F). Throw away any unused medicine  after the expiration date.  NOTE: This sheet is a summary. It may not cover all possible information. If you have questions about this medicine, talk to your doctor, pharmacist, or health care provider.  © 2018 [employer]/Gold Standard (2012-11-06 09:18:24)      Aspirin, ASA oral tablets  What is this medicine?  ASPIRIN (AS pir in) is a pain reliever. It is used to treat mild pain and fever. This medicine is also used as directed by a doctor to prevent and to treat heart attacks, to prevent strokes, and to treat arthritis or inflammation.  This medicine may be used for other purposes; ask your health care provider or pharmacist if you have questions.  COMMON BRAND NAME(S): Aspir-Low, Aspir-Marry, Aspirtab, Daniela Advanced Aspirin, Daniela Aspirin, Daniela Aspirin Extra Strength, Daniela Aspirin Plus, Daniela Extra Strength, Daniela Extra Strength Plus, Daniela Genuine Aspirin, Daniela Womens Aspirin, Bufferin, Bufferin Extra Strength, Bufferin Low Dose  What should I tell my health care provider before I take this medicine?  They need to know if you have any of these conditions:  -anemia  -asthma  -bleeding problems  -child with chickenpox, the flu, or other viral infection  -diabetes  -gout  -if you frequently drink alcohol containing drinks  -kidney disease  -liver disease  -low level of vitamin K  -lupus  -smoke tobacco  -stomach ulcers or other problems  -an unusual or allergic reaction to aspirin, tartrazine dye, other medicines, dyes, or preservatives  -pregnant or trying to get pregnant  -breast-feeding  How should I use this medicine?  Take this medicine by mouth with a glass of water. Follow the directions on the package or prescription label. You can take this medicine with or without food. If it upsets your stomach, take it with food. Do not take your medicine more often than directed.  Talk to your pediatrician regarding the use of this medicine in children. While this drug may be prescribed for children as young as 12  years of age for selected conditions, precautions do apply. Children and teenagers should not use this medicine to treat chicken pox or flu symptoms unless directed by a doctor.  Patients over 65 years old may have a stronger reaction and need a smaller dose.  Overdosage: If you think you have taken too much of this medicine contact a poison control center or emergency room at once.  NOTE: This medicine is only for you. Do not share this medicine with others.  What if I miss a dose?  If you are taking this medicine on a regular schedule and miss a dose, take it as soon as you can. If it is almost time for your next dose, take only that dose. Do not take double or extra doses.  What may interact with this medicine?  Do not take this medicine with any of the following medications:  -cidofovir  -ketorolac  -probenecid  This medicine may also interact with the following medications:  -alcohol  -alendronate  -bismuth subsalicylate  -flavocoxid  -herbal supplements like feverfew, garlic, lewis, ginkgo biloba, horse chestnut  -medicines for diabetes or glaucoma like acetazolamide, methazolamide  -medicines for gout  -medicines that treat or prevent blood clots like enoxaparin, heparin, ticlopidine, warfarin  -other aspirin and aspirin-like medicines  -NSAIDs, medicines for pain and inflammation, like ibuprofen or naproxen  -pemetrexed  -sulfinpyrazone  -varicella live vaccine  This list may not describe all possible interactions. Give your health care provider a list of all the medicines, herbs, non-prescription drugs, or dietary supplements you use. Also tell them if you smoke, drink alcohol, or use illegal drugs. Some items may interact with your medicine.  What should I watch for while using this medicine?  If you are treating yourself for pain, tell your doctor or health care professional if the pain lasts more than 10 days, if it gets worse, or if there is a new or different kind of pain. Tell your doctor if you see  redness or swelling. Also, check with your doctor if you have a fever that lasts for more than 3 days. Only take this medicine to prevent heart attacks or blood clotting if prescribed by your doctor or health care professional.  Do not take aspirin or aspirin-like medicines with this medicine. Too much aspirin can be dangerous. Always read the labels carefully.  This medicine can irritate your stomach or cause bleeding problems. Do not smoke cigarettes or drink alcohol while taking this medicine. Do not lie down for 30 minutes after taking this medicine to prevent irritation to your throat.  If you are scheduled for any medical or dental procedure, tell your healthcare provider that you are taking this medicine. You may need to stop taking this medicine before the procedure.  This medicine may be used to treat migraines. If you take migraine medicines for 10 or more days a month, your migraines may get worse. Keep a diary of headache days and medicine use. Contact your healthcare professional if your migraine attacks occur more frequently.  What side effects may I notice from receiving this medicine?  Side effects that you should report to your doctor or health care professional as soon as possible:  -allergic reactions like skin rash, itching or hives, swelling of the face, lips, or tongue  -breathing problems  -changes in hearing, ringing in the ears  -confusion  -general ill feeling or flu-like symptoms  -pain on swallowing  -redness, blistering, peeling or loosening of the skin, including inside the mouth or nose  -signs and symptoms of bleeding such as bloody or black, tarry stools; red or dark-brown urine; spitting up blood or brown material that looks like coffee grounds; red spots on the skin; unusual bruising or bleeding from the eye, gums, or nose  -trouble passing urine or change in the amount of urine  -unusually weak or tired  -yellowing of the eyes or skin  Side effects that usually do not require  "medical attention (report to your doctor or health care professional if they continue or are bothersome):  -diarrhea or constipation  -headache  -nausea, vomiting  -stomach gas, heartburn  This list may not describe all possible side effects. Call your doctor for medical advice about side effects. You may report side effects to FDA at 9-742-ZWW-7295.  Where should I keep my medicine?  Keep out of the reach of children.  Store at room temperature between 15 and 30 degrees C (59 and 86 degrees F). Protect from heat and moisture. Do not use this medicine if it has a strong vinegar smell. Throw away any unused medicine after the expiration date.  NOTE: This sheet is a summary. It may not cover all possible information. If you have questions about this medicine, talk to your doctor, pharmacist, or health care provider.  © 2018 [employer] (2014-08-19 11:30:31)      Transient Ischemic Attack  A transient ischemic attack (TIA) is a \"warning stroke\" that causes stroke-like symptoms. A TIA does not cause lasting damage to the brain. The symptoms of a TIA can happen fast and do not last long. It is important to know the symptoms of a TIA and what to do. This can help prevent stroke or death.  Follow these instructions at home:  · Take medicines only as told by your doctor. Make sure you understand all of the instructions.  · You may need to take aspirin or warfarin medicine. Warfarin needs to be taken exactly as told.  ¨ Taking too much or too little warfarin is dangerous. Blood tests must be done as often as told by your doctor. A PT blood test measures how long it takes for blood to clot. Your PT is used to calculate another value called an INR. Your PT and INR help your doctor adjust your warfarin dosage. He or she will make sure you are taking the right amount.  ¨ Food can cause problems with warfarin and affect the results of your blood tests. This is true for foods high in vitamin K. Eat the same amount of " foods high in vitamin K each day. Foods high in vitamin K include spinach, kale, broccoli, cabbage, west and turnip greens, [place] sprouts, peas, cauliflower, seaweed, and parsley. Other foods high in vitamin K include beef and pork liver, green tea, and soybean oil. Eat the same amount of foods high in vitamin K each day. Avoid big changes in your diet. Tell your doctor before changing your diet. Talk to a  (dietitian) if you have questions.  ¨ Many medicines can cause problems with warfarin and affect your PT and INR. Tell your doctor about all medicines you take. This includes vitamins and dietary pills (supplements). Do not take or stop taking any prescribed or over-the-counter medicines unless your doctor tells you to.  ¨ Warfarin can cause more bruising or bleeding. Hold pressure over any cuts for longer than normal. Talk to your doctor about other side effects of warfarin.  ¨ Avoid sports or activities that may cause injury or bleeding.  ¨ Be careful when you shave, floss, or use sharp objects.  ¨ Avoid or drink very little alcohol while taking warfarin. Tell your doctor if you change how much alcohol you drink.  ¨ Tell your dentist and other doctors that you take warfarin before any procedures.  · Follow your diet program as told, if you are given one.  · Keep a healthy weight.  · Stay active. Try to get at least 30 minutes of activity on all or most days.  · Do not use any tobacco products, including cigarettes, chewing tobacco, or electronic cigarettes. If you need help quitting, ask your doctor.  · Limit alcohol intake to no more than 1 drink per day for nonpregnant women and 2 drinks per day for men. One drink equals 12 ounces of beer, 5 ounces of wine, or 1½ ounces of hard liquor.  · Do not abuse drugs.  · Keep your home safe so you do not fall. You can do this by:  ¨ Putting grab bars in the bedroom and bathroom.  ¨ Raising toilet seats.  ¨ Putting a seat in the shower.  · Keep all  follow-up visits as told by your doctor. This is important.  Contact a doctor if:  · Your personality changes.  · You have trouble swallowing.  · You have double vision.  · You are dizzy.  · You have a fever.  Get help right away if:  These symptoms may be an emergency. Do not wait to see if the symptoms will go away. Get medical help right away. Call your local emergency services (911 in the [place] Do not drive yourself to the hospital.  · You have sudden weakness or lose feeling (go numb), especially on one side of the body. This can affect your:  ¨ Face.  ¨ Arm.  ¨ Leg.  · You have sudden trouble walking.  · You have sudden trouble moving your arms or legs.  · You have sudden confusion.  · You have trouble talking.  · You have trouble understanding.  · You have sudden trouble seeing in one or both eyes.  · You lose your balance.  · Your movements are not smooth.  · You have a sudden, very bad headache with no known cause.  · You have new chest pain.  · Your heartbeat is unsteady.  · You are partly or totally unaware of what is going on around you.  This information is not intended to replace advice given to you by your health care provider. Make sure you discuss any questions you have with your health care provider.  Document Released: 09/26/2009 Document Revised: 08/21/2017 Document Reviewed: 03/25/2015  [employer] Interactive Patient Education © 2017 [employer]      Depression / Suicide Risk    As you are discharged from this [facility] facility, it is important to learn how to keep safe from harming yourself.    Recognize the warning signs:  · Abrupt changes in personality, positive or negative- including increase in energy   · Giving away possessions  · Change in eating patterns- significant weight changes-  positive or negative  · Change in sleeping patterns- unable to sleep or sleeping all the time   · Unwillingness or inability to communicate  · Depression  · Unusual sadness, discouragement and  loneliness  · Talk of wanting to die  · Neglect of personal appearance   · Rebelliousness- reckless behavior  · Withdrawal from people/activities they love  · Confusion- inability to concentrate     If you or a loved one observes any of these behaviors or has concerns about self-harm, here's what you can do:  · Talk about it- your feelings and reasons for harming yourself  · Remove any means that you might use to hurt yourself (examples: pills, rope, extension cords, firearm)  · Get professional help from the community (Mental Health, Substance Abuse, psychological counseling)  · Do not be alone:Call your Safe Contact- someone whom you trust who will be there for you.  · Call your local CRISIS HOTLINE 401-0175 or [phone]  · Call your local Children's Mobile Crisis Response Team [place] (897) 551-2772 or www.[employer]  · Call the toll free National Suicide Prevention Hotlines   · National Suicide Prevention Lifeline 210-601-PJMO (4597)  · National Hope Line Network 800-SUICIDE (623-3220)

## 2018-12-18 LAB — EKG IMPRESSION: NORMAL

## 2018-12-29 NOTE — ADDENDUM NOTE
Encounter addended by: Dave Alejandre M.D. on: 12/28/2018  4:32 PM<BR>    Actions taken: Edit attestation on clinical note

## 2019-05-10 ENCOUNTER — OFFICE VISIT (OUTPATIENT)
Dept: URGENT CARE | Facility: CLINIC | Age: 64
End: 2019-05-10
Payer: COMMERCIAL

## 2019-05-10 VITALS
BODY MASS INDEX: 23.86 KG/M2 | DIASTOLIC BLOOD PRESSURE: 70 MMHG | TEMPERATURE: 98.5 F | SYSTOLIC BLOOD PRESSURE: 110 MMHG | HEART RATE: 65 BPM | HEIGHT: 67 IN | RESPIRATION RATE: 14 BRPM | OXYGEN SATURATION: 98 % | WEIGHT: 152 LBS

## 2019-05-10 DIAGNOSIS — G89.29 CHRONIC PAIN OF BOTH SHOULDERS: Primary | ICD-10-CM

## 2019-05-10 DIAGNOSIS — M25.511 CHRONIC PAIN OF BOTH SHOULDERS: Primary | ICD-10-CM

## 2019-05-10 DIAGNOSIS — M25.512 CHRONIC PAIN OF BOTH SHOULDERS: Primary | ICD-10-CM

## 2019-05-10 PROCEDURE — 99204 OFFICE O/P NEW MOD 45 MIN: CPT | Performed by: PHYSICIAN ASSISTANT

## 2019-05-10 RX ORDER — METHYLPREDNISOLONE 4 MG/1
TABLET ORAL
Qty: 21 TAB | Refills: 0 | Status: SHIPPED
Start: 2019-05-10 | End: 2020-03-07

## 2019-05-10 RX ORDER — TRAMADOL HYDROCHLORIDE 50 MG/1
50-100 TABLET ORAL EVERY 4 HOURS PRN
Qty: 30 TAB | Refills: 0 | Status: SHIPPED | OUTPATIENT
Start: 2019-05-10 | End: 2019-05-15

## 2019-05-10 RX ORDER — CYCLOBENZAPRINE HCL 5 MG
5-10 TABLET ORAL 3 TIMES DAILY PRN
Qty: 30 TAB | Refills: 0 | Status: SHIPPED
Start: 2019-05-10 | End: 2020-03-07

## 2019-05-10 NOTE — PATIENT INSTRUCTIONS
Degenerative Arthritis  You have osteoarthritis. This is the wear and tear arthritis that comes with aging. It is also called degenerative arthritis. This is common in people past middle age. It is caused by stress on the joints. The large weight bearing joints of the lower extremities are most often affected. The knees, hips, back, neck, and hands can become painful, swollen, and stiff. This is the most common type of arthritis. It comes on with age, carrying too much weight, or from an injury.  Treatment includes resting the sore joint until the pain and swelling improve. Crutches or a walker may be needed for severe flares. Only take over-the-counter or prescription medicines for pain, discomfort, or fever as directed by your caregiver. Local heat therapy may improve motion. Cortisone shots into the joint are sometimes used to reduce pain and swelling during flares.  Osteoarthritis is usually not crippling and progresses slowly. There are things you can do to decrease pain:  · Avoid high impact activities.   · Exercise regularly.   · Low impact exercises such as walking, biking and swimming help to keep the muscles strong and keep normal joint function.   · Stretching helps to keep your range of motion.   · Lose weight if you are overweight. This reduces joint stress.   In severe cases when you have pain at rest or increasing disability, joint surgery may be helpful. See your caregiver for follow-up treatment as recommended.   SEEK IMMEDIATE MEDICAL CARE IF:   · You have severe joint pain.   · Marked swelling and redness in your joint develops.   · You develop a high fever.   Document Released: 12/18/2006 Document Revised: 03/11/2013 Document Reviewed: 05/19/2008  [employer]® Patient Information ©2013 [employer].

## 2019-05-11 NOTE — PROGRESS NOTES
Subjective:      Pt is a 64 y.o. male who presents with Joint Pain (x couple weeks now)            HPI  This is a chronic issue. PT notes he has been a [employer] by trade x 30 years and has B/L chronic shoulder pain with a flare up in the last 2 weeks but notes pain is getting more severe and ROM is becoming more limited. Pt has not taken any Rx medications for this condition. Pt states the pain is a 8-9/10, aching in nature and worse at night which wakes him up while sleeping. Pt denies CP, SOB, NVD, paresthesias, headaches, dizziness, change in vision, hives, or other joint pain. The pt's medication list, problem list, and allergies have been evaluated and reviewed during today's visit.    PMH:  Past Medical History:   Diagnosis Date   • Arthritis    • Bronchitis    • Cataract    • Hypertension        PSH:  No past surgical history on file.    Fam Hx:  the patient's family history is not pertinent to their current complaint    Soc HX:  Social History     Social History   • Marital status:      Spouse name: N/A   • Number of children: N/A   • Years of education: N/A     Occupational History   • Not on file.     Social History Main Topics   • Smoking status: Former Smoker     Packs/day: 0.50     Years: 18.00     Types: Cigarettes     Start date: 1/23/2000     Quit date: 2/25/2019   • Smokeless tobacco: Never Used   • Alcohol use Yes      Comment: moderately    • Drug use: No   • Sexual activity: Not on file     Other Topics Concern   • Not on file     Social History Narrative   • No narrative on file         Medications:    Current Outpatient Prescriptions:   •  cyclobenzaprine (FLEXERIL) 5 MG tablet, Take 1-2 Tabs by mouth 3 times a day as needed., Disp: 30 Tab, Rfl: 0  •  MethylPREDNISolone (MEDROL DOSEPAK) 4 MG Tablet Therapy Pack, Use as directed, Disp: 21 Tab, Rfl: 0  •  tramadol (ULTRAM) 50 MG Tab, Take 1-2 Tabs by mouth every four hours as needed for up to 5 days., Disp: 30 Tab, Rfl: 0  •  tamsulosin  "(FLOMAX) 0.4 MG capsule, Take 0.4 mg by mouth every evening., Disp: , Rfl:       Allergies:  Patient has no known allergies.    ROS    Constitutional: Negative for fever, chills and malaise/fatigue.   HENT: Negative for congestion and sore throat.    Eyes: Negative for blurred vision, double vision and photophobia.   Respiratory: Negative for cough and shortness of breath.  Cardiovascular: Negative for chest pain and palpitations.   Gastrointestinal: Negative for heartburn, nausea, vomiting, abdominal pain, diarrhea and constipation.   Genitourinary: Negative for dysuria and flank pain.   Musculoskeletal: POS for B/L shoulder joint pain and myalgias.   Skin: Negative for itching and rash.   Neurological: Negative for dizziness, tingling and headaches.   Endo/Heme/Allergies: Does not bruise/bleed easily.   Psychiatric/Behavioral: Negative for depression. The patient is not nervous/anxious.         Objective:     /70 (BP Location: Right arm, Patient Position: Sitting, BP Cuff Size: Adult)   Pulse 65   Temp 36.9 °C (98.5 °F) (Temporal)   Resp 14   Ht 1.702 m (5' 7\")   Wt 68.9 kg (152 lb)   SpO2 98%   BMI 23.81 kg/m²      Physical Exam   Musculoskeletal:        Right shoulder: He exhibits decreased range of motion, tenderness, pain, spasm and decreased strength. He exhibits no bony tenderness, no swelling, no effusion, no crepitus, no deformity, no laceration and normal pulse.        Left shoulder: He exhibits decreased range of motion, tenderness, pain and spasm. He exhibits no bony tenderness, no swelling, no effusion, no crepitus, no deformity, no laceration, normal pulse and normal strength.        Arms:        Constitutional: PT is oriented to person, place, and time. PT appears well-developed and well-nourished. No distress.   HENT:   Head: Normocephalic and atraumatic.   Mouth/Throat: Oropharynx is clear and moist. No oropharyngeal exudate.   Eyes: Conjunctivae normal and EOM are normal. Pupils are " equal, round, and reactive to light.   Neck: Normal range of motion. Neck supple. No thyromegaly present.   Cardiovascular: Normal rate, regular rhythm, normal heart sounds and intact distal pulses.  Exam reveals no gallop and no friction rub.    No murmur heard.  Pulmonary/Chest: Effort normal and breath sounds normal. No respiratory distress. PT has no wheezes. PT has no rales. Pt exhibits no tenderness.   Abdominal: Soft. Bowel sounds are normal. PT exhibits no distension and no mass. There is no tenderness. There is no rebound and no guarding.   Neurological: PT is alert and oriented to person, place, and time. PT has normal reflexes. No cranial nerve deficit.   Skin: Skin is warm and dry. No rash noted. PT is not diaphoretic. No erythema.       Psychiatric: PT has a normal mood and affect. PT behavior is normal. Judgment and thought content normal.          Assessment/Plan:     1. Chronic pain of both shoulders    - REFERRAL TO SPORTS MEDICINE  - cyclobenzaprine (FLEXERIL) 5 MG tablet; Take 1-2 Tabs by mouth 3 times a day as needed.  Dispense: 30 Tab; Refill: 0  - MethylPREDNISolone (MEDROL DOSEPAK) 4 MG Tablet Therapy Pack; Use as directed  Dispense: 21 Tab; Refill: 0  - tramadol (ULTRAM) 50 MG Tab; Take 1-2 Tabs by mouth every four hours as needed for up to 5 days.  Dispense: 30 Tab; Refill: 0  - Consent for Opiate Prescription    [place]  Aware web site evaluation: I have obtained and reviewed patient utilization report from [facility] pharmacy database prior to writing prescription for controlled substance II, III or IV per [place] bill . Based on the report and my clinical assessment the prescription is medically necessary.   NSAIDs for pain 1-5, Ultram for pain 6-10 or to help get to sleep.  RICE therapy discussed  Gentle ROM exercises discussed  WBAT BUE  Ice/heat therapy discussed  OTC ibuprofen for pain control  Rest, fluids encouraged.  AVS with medical info given.  Pt was in full  understanding and agreement with the plan.  Follow-up as needed if symptoms worsen or fail to improve.

## 2019-05-23 ENCOUNTER — OFFICE VISIT (OUTPATIENT)
Dept: URGENT CARE | Facility: CLINIC | Age: 64
End: 2019-05-23
Payer: COMMERCIAL

## 2019-05-23 VITALS
OXYGEN SATURATION: 96 % | RESPIRATION RATE: 18 BRPM | BODY MASS INDEX: 23.86 KG/M2 | TEMPERATURE: 98.3 F | WEIGHT: 152 LBS | SYSTOLIC BLOOD PRESSURE: 120 MMHG | DIASTOLIC BLOOD PRESSURE: 72 MMHG | HEIGHT: 67 IN | HEART RATE: 78 BPM

## 2019-05-23 DIAGNOSIS — S39.012A STRAIN OF LUMBAR REGION, INITIAL ENCOUNTER: ICD-10-CM

## 2019-05-23 PROCEDURE — 99213 OFFICE O/P EST LOW 20 MIN: CPT | Performed by: PHYSICIAN ASSISTANT

## 2019-05-23 ASSESSMENT — ENCOUNTER SYMPTOMS
WEAKNESS: 0
SHORTNESS OF BREATH: 0
TINGLING: 0
PALPITATIONS: 0
NAUSEA: 0
ABDOMINAL PAIN: 0
LEG PAIN: 0
FEVER: 0
BACK PAIN: 1
NUMBNESS: 0
VOMITING: 0
SENSORY CHANGE: 0
WEIGHT LOSS: 0
CHILLS: 0
BLURRED VISION: 0
BOWEL INCONTINENCE: 0
HEADACHES: 0
PARESTHESIAS: 0

## 2019-05-23 NOTE — PROGRESS NOTES
Subjective:      Adrián Sheldon is a 64 y.o. male who presents with Back Pain      Back Pain   This is a new problem. The current episode started 1 to 4 weeks ago. The problem occurs constantly. The problem has been gradually worsening since onset. The pain is present in the lumbar spine. The pain does not radiate. The pain is moderate. The pain is the same all the time. The symptoms are aggravated by bending, position and twisting. Pertinent negatives include no abdominal pain, bladder incontinence, bowel incontinence, chest pain, dysuria, fever, headaches, leg pain, numbness, paresthesias, pelvic pain, tingling, weakness or weight loss.   Patient states that he did have some of his back pain a few weeks ago when he was evaluated for his shoulders.  At that time he was placed onShort course of steroids, given a muscle relaxer, and prescribed Tramadol.  He does report that when he takes a hot shower and wants the water run over his lower back does feel better.  He says all the pain has been on his right side.  No injury that he can remember.    Review of Systems   Constitutional: Negative for chills, fever and weight loss.   Eyes: Negative for blurred vision.   Respiratory: Negative for shortness of breath.    Cardiovascular: Negative for chest pain and palpitations.   Gastrointestinal: Negative for abdominal pain, bowel incontinence, nausea and vomiting.   Genitourinary: Negative for bladder incontinence, dysuria and pelvic pain.   Musculoskeletal: Positive for back pain.   Neurological: Negative for tingling, sensory change, weakness, numbness, headaches and paresthesias.       PMH:  has a past medical history of Arthritis; Bronchitis; Cataract; and Hypertension. He also has no past medical history of Anginal syndrome (HCC); Arrhythmia; Asthma; At risk for sleep apnea; Back pain; Blood clotting disorder (HCC); Cancer (HCC); Congestive heart failure (HCC); COPD (chronic obstructive pulmonary disease) ([facility]);  "Diabetes (HCC); Dialysis patient (HCC); Disorder of thyroid; Fall; Glaucoma; Gynecological disorder; Heart murmur; Heart valve disease; Hemorrhagic disorder (HCC); Indigestion; Infectious disease; Jaundice; Myocardial infarct (HCC); Pacemaker; Pneumonia; Psychiatric problem; Renal disorder; Rheumatic fever; Seizure (HCC); Stroke (HCC); or Urinary incontinence.  MEDS:   Current Outpatient Prescriptions:   •  cyclobenzaprine (FLEXERIL) 5 MG tablet, Take 1-2 Tabs by mouth 3 times a day as needed. (Patient not taking: Reported on 5/23/2019), Disp: 30 Tab, Rfl: 0  •  MethylPREDNISolone (MEDROL DOSEPAK) 4 MG Tablet Therapy Pack, Use as directed (Patient not taking: Reported on 5/23/2019), Disp: 21 Tab, Rfl: 0  •  tamsulosin (FLOMAX) 0.4 MG capsule, Take 0.4 mg by mouth every evening., Disp: , Rfl:   ALLERGIES: No Known Allergies  SURGHX: No past surgical history on file.  SOCHX:  reports that he quit smoking about 2 months ago. His smoking use included Cigarettes. He started smoking about 19 years ago. He has a 9.00 pack-year smoking history. He has never used smokeless tobacco. He reports that he drinks alcohol. He reports that he does not use drugs.  FH: Family history was reviewed, no pertinent findings to report     Objective:     /72   Pulse 78   Temp 36.8 °C (98.3 °F) (Temporal)   Resp 18   Ht 1.702 m (5' 7\")   Wt 68.9 kg (152 lb)   SpO2 96%   BMI 23.81 kg/m²      Physical Exam   Constitutional: He is oriented to person, place, and time. He appears well-developed and well-nourished.   HENT:   Head: Normocephalic and atraumatic.   Right Ear: External ear normal.   Left Ear: External ear normal.   Eyes: Pupils are equal, round, and reactive to light. Conjunctivae are normal.   Cardiovascular: Normal rate, regular rhythm and normal heart sounds.    No murmur heard.  Pulmonary/Chest: Effort normal and breath sounds normal. He has no wheezes.   Musculoskeletal:        Lumbar back: He exhibits decreased range " of motion and tenderness. He exhibits no bony tenderness, no swelling, no edema, no deformity, no laceration, no pain, no spasm and normal pulse.        Back:    Neurological: He is alert and oriented to person, place, and time. He has normal strength.   Reflex Scores:       Patellar reflexes are 2+ on the right side and 2+ on the left side.  Skin: Skin is warm and dry. Capillary refill takes less than 2 seconds.   Psychiatric: He has a normal mood and affect. His behavior is normal. Judgment normal.          Assessment/Plan:     1. Strain of lumbar region, initial encounter  - Patient just 2 weeks ago was prescribed a course of steroids, muscle relaxers, and Tramadol.  Patient has an appointment scheduled with sports medicine for next week for his shoulders.  At this point I would just recommend taking OTC NSAIDs and alternating ice and heat to the affected area and have him follow-up with sports medicine.      Differential Diagnosis, natural history, and supportive care discussed. Return to the Urgent Care or follow up with your PCP if symptoms fail to resolve, or for any new or worsening symptoms. Emergency room precautions discussed. Patient and/or family appears understanding of information.

## 2019-05-23 NOTE — LETTER
May 23, 2019         Patient: Adrián Sheldon   YOB: 1955   Date of Visit: 5/23/2019           To Whom it May Concern:    Adrián Sheldon was seen in my clinic on 5/23/2019.     If you have any questions or concerns, please don't hesitate to call.        Sincerely,           Simin Desai P.A.-C.  Electronically Signed

## 2019-05-28 ENCOUNTER — OFFICE VISIT (OUTPATIENT)
Dept: MEDICAL GROUP | Facility: CLINIC | Age: 64
End: 2019-05-28
Payer: COMMERCIAL

## 2019-05-28 ENCOUNTER — HOSPITAL ENCOUNTER (OUTPATIENT)
Dept: LAB | Facility: MEDICAL CENTER | Age: 64
End: 2019-05-28
Attending: FAMILY MEDICINE
Payer: COMMERCIAL

## 2019-05-28 ENCOUNTER — APPOINTMENT (OUTPATIENT)
Dept: RADIOLOGY | Facility: IMAGING CENTER | Age: 64
End: 2019-05-28
Attending: FAMILY MEDICINE
Payer: COMMERCIAL

## 2019-05-28 VITALS
HEIGHT: 67 IN | RESPIRATION RATE: 16 BRPM | BODY MASS INDEX: 23.86 KG/M2 | TEMPERATURE: 99.5 F | SYSTOLIC BLOOD PRESSURE: 118 MMHG | WEIGHT: 152 LBS | HEART RATE: 74 BPM | OXYGEN SATURATION: 97 % | DIASTOLIC BLOOD PRESSURE: 68 MMHG

## 2019-05-28 DIAGNOSIS — M25.50 MULTIPLE JOINT PAIN: ICD-10-CM

## 2019-05-28 PROCEDURE — 77077 JOINT SURVEY SINGLE VIEW: CPT | Mod: TC | Performed by: FAMILY MEDICINE

## 2019-05-28 PROCEDURE — 86141 C-REACTIVE PROTEIN HS: CPT

## 2019-05-28 PROCEDURE — 36415 COLL VENOUS BLD VENIPUNCTURE: CPT

## 2019-05-28 PROCEDURE — 99213 OFFICE O/P EST LOW 20 MIN: CPT | Performed by: FAMILY MEDICINE

## 2019-05-28 PROCEDURE — 86200 CCP ANTIBODY: CPT

## 2019-05-28 PROCEDURE — 86431 RHEUMATOID FACTOR QUANT: CPT

## 2019-05-28 RX ORDER — MELOXICAM 7.5 MG/1
7.5 TABLET ORAL
Qty: 30 TAB | Refills: 1 | Status: SHIPPED
Start: 2019-05-28 | End: 2020-03-07

## 2019-05-28 ASSESSMENT — ENCOUNTER SYMPTOMS
FEVER: 0
CHILLS: 0
HEADACHES: 0
NAUSEA: 0
DIZZINESS: 0
SHORTNESS OF BREATH: 0
VOMITING: 0

## 2019-05-28 NOTE — PROGRESS NOTES
Subjective:      Adrián Sheldon is a 64 y.o. 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   Respiratory: Negative for shortness of breath.    Cardiovascular: Negative for chest pain.   Gastrointestinal: Negative for nausea and vomiting.   Neurological: Negative for dizziness and headaches.      PMH:  has a past medical history of Arthritis; Bronchitis; Cataract; and Hypertension. He also has no past medical history of Anginal syndrome (HCC); Arrhythmia; Asthma; At risk for sleep apnea; Back pain; Blood clotting disorder (HCC); Cancer (HCC); Congestive heart failure (HCC); COPD (chronic obstructive pulmonary disease) (HCC); Diabetes (HCC); Dialysis patient (HCC); Disorder of thyroid; Fall; Glaucoma; Gynecological disorder; Heart murmur; Heart valve disease; Hemorrhagic disorder (HCC); Indigestion; Infectious disease; Jaundice; Myocardial infarct (HCC); Pacemaker; Pneumonia; Psychiatric problem; Renal disorder; Rheumatic fever; Seizure (HCC); Stroke (HCC); or Urinary incontinence.  MEDS:   Current Outpatient Prescriptions:   •  cyclobenzaprine (FLEXERIL) 5 MG tablet, Take 1-2 Tabs by mouth  "3 times a day as needed. (Patient not taking: Reported on 5/23/2019), Disp: 30 Tab, Rfl: 0  •  MethylPREDNISolone (MEDROL DOSEPAK) 4 MG Tablet Therapy Pack, Use as directed (Patient not taking: Reported on 5/23/2019), Disp: 21 Tab, Rfl: 0  •  tamsulosin (FLOMAX) 0.4 MG capsule, Take 0.4 mg by mouth every evening., Disp: , Rfl:   ALLERGIES: No Known Allergies  SURGHX: History reviewed. No pertinent surgical history.  SOCHX:  reports that he quit smoking about 3 months ago. His smoking use included Cigarettes. He started smoking about 19 years ago. He has a 9.00 pack-year smoking history. He has never used smokeless tobacco. He reports that he drinks alcohol. He reports that he does not use drugs.  FH: Family history was reviewed, no pertinent findings to report'     Objective:     /68 (BP Location: Right arm, Patient Position: Sitting, BP Cuff Size: Adult)   Pulse 74   Temp 37.5 °C (99.5 °F) (Temporal)   Resp 16   Ht 1.702 m (5' 7\")   Wt 68.9 kg (152 lb)   SpO2 97%   BMI 23.81 kg/m²      Physical Exam     No acute distress    Cervical spine:  Range of motion INTACT    BILATERAL shoulder exam:  Range of motion INTACT  Strength testing NORMAL with empty can, internal rotation, external rotation and lift off testing  NO tenderness of the supraspinatus, infraspinatus or biceps tendon  Apprehension testing NORMAL    Knee exam:  RIGHT KNEE:  Normal alignment  No visual swelling or deformity  Anterior knee nontender  Negative apprehension  No  joint line tenderness medially or laterally  Rod's testing is negative medially and laterally  Lachman's testing is trace with good endpoint  No laxity to varus and valgus stress  The legs otherwise neurovascularly intact    LEFT KNEE:  Normal alignment  No visual swelling or deformity  Anterior knee nontender  Negative apprehension  No  joint line tenderness medially or laterally  Rod's testing is negative medially and laterally  Lachman's testing is trace with " good endpoint  No laxity to varus and valgus stress  The legs otherwise neurovascularly intact    Assessment/Plan:     1. Multiple joint pain  DX-JOINT SURVEY-HANDS SINGLE VIEW    RHEUMATOID ARTHRITIS FACTOR    CCP-CYCLIC CITRULLINATED PEPTID    C-REACTIVE PROTEIN CARDIAC    meloxicam (MOBIC) 7.5 MG Tab     Patient was originally sent for number spine pain  However, patient is more concerned about having multiple joint pain  He is concerned that his symptoms may be related to rheumatological or autoimmune disease  Joint survey does not demonstrate any evidence of autoimmune disease  He does have some findings consistent with osteoarthritis    Check labs  He will let us know when he gets the labs done so we can communicate results        5/28/2019 3:18 PM    HISTORY/REASON FOR EXAM:  Atraumatic Pain/Swelling/Deformity.  Joint pain for 2 months.    TECHNIQUE/EXAM DESCRIPTION AND NUMBER OF VIEWS:  Joint survey, single view of the and.    COMPARISON: None    FINDINGS:  Narrowing of the metacarpophalangeal and interphalangeal joints bilaterally.  Mild narrowing of the radiocarpal joints.  No focal soft tissue swelling or bony destruction.  Lytic lesion within the LEFT 3rd proximal phalanx, likely bone cyst or enchondroma.   Impression       1.  Moderate osteoarthritis.  2.  No evidence for inflammatory arthropathy.     Interpreted in the office today with the patient    Thank you Antoni Lóepz PA-C for allowing me to participate in caring for the patient.

## 2019-05-29 LAB
CRP SERPL HS-MCNC: 29.5 MG/L (ref 0–7.5)
RHEUMATOID FACT SER IA-ACNC: <10 IU/ML (ref 0–14)

## 2019-06-07 LAB — CCP IGG SERPL-ACNC: NORMAL

## 2019-06-27 ENCOUNTER — TELEPHONE (OUTPATIENT)
Dept: MEDICAL GROUP | Facility: CLINIC | Age: 64
End: 2019-06-27

## 2019-06-27 NOTE — TELEPHONE ENCOUNTER
ELEVATED CRP....      C Reactive Protein High Sensitive 29.5   0.0 - 7.5 mg/L Final     Rheumatoid Factor -Neph- <10  0 - 14 IU/mL Final     Cyclic Citrullinated Peptide Ab, IgG: 3 Units    (Ref Interval: 0-19)   INTERPRETIVE INFORMATION: Cyclic Citrullinated Peptide Antibody, IgG   19 Units or less ................... Negative       Called and Left message on VM  Consider repeating CRP and further testing for non-specific multi-joint pain

## 2019-07-17 ENCOUNTER — TELEPHONE (OUTPATIENT)
Dept: MEDICAL GROUP | Facility: CLINIC | Age: 64
End: 2019-07-17

## 2019-07-17 NOTE — TELEPHONE ENCOUNTER
The patient today to make a follow up appointment with Dr. Duarte regarding a new issue for right hip pain and appointment was made for 07/30/19 @ 1330. The patient was notified about the contract issue that Mirlande is having with the insurance [employer] (contract) and we are in-network until the end of the month 7/31/19. The patient understood and wanted to see Dr. Duarte on 7/19/19. The patient will bring insurance card to visit.ZARA

## 2019-07-19 ENCOUNTER — APPOINTMENT (OUTPATIENT)
Dept: RADIOLOGY | Facility: IMAGING CENTER | Age: 64
End: 2019-07-19
Attending: FAMILY MEDICINE
Payer: COMMERCIAL

## 2019-07-19 ENCOUNTER — OFFICE VISIT (OUTPATIENT)
Dept: MEDICAL GROUP | Facility: CLINIC | Age: 64
End: 2019-07-19
Payer: COMMERCIAL

## 2019-07-19 VITALS
DIASTOLIC BLOOD PRESSURE: 62 MMHG | RESPIRATION RATE: 18 BRPM | HEIGHT: 67 IN | OXYGEN SATURATION: 98 % | SYSTOLIC BLOOD PRESSURE: 120 MMHG | TEMPERATURE: 98.9 F | HEART RATE: 88 BPM | WEIGHT: 152 LBS | BODY MASS INDEX: 23.86 KG/M2

## 2019-07-19 DIAGNOSIS — M16.11 OSTEOARTHRITIS OF ONE HIP, RIGHT: ICD-10-CM

## 2019-07-19 DIAGNOSIS — M25.851 FEMOROACETABULAR IMPINGEMENT OF RIGHT HIP: ICD-10-CM

## 2019-07-19 DIAGNOSIS — M25.551 HIP PAIN, RIGHT: ICD-10-CM

## 2019-07-19 PROCEDURE — 73502 X-RAY EXAM HIP UNI 2-3 VIEWS: CPT | Mod: TC,RT | Performed by: FAMILY MEDICINE

## 2019-07-19 PROCEDURE — 99214 OFFICE O/P EST MOD 30 MIN: CPT | Performed by: FAMILY MEDICINE

## 2019-07-19 ASSESSMENT — ENCOUNTER SYMPTOMS
CHILLS: 0
HEADACHES: 0
DIZZINESS: 0
NAUSEA: 0
SHORTNESS OF BREATH: 0
VOMITING: 0
FEVER: 0

## 2019-07-19 NOTE — PROGRESS NOTES
"Subjective:      Adrián Sheldon is a 64 y.o. male who presents with Hip Pain (EP/ R hip pain )     Referred by Antoni López PA-C for evaluation of NEW problem, RIGHT hip pain    HPI   RIGHT hip pain  Lateral soreness, ache  Insidious onset 1 month ago, gradual onset without injury   Intermittent  Worse with rapid or extensive walking  improved with rest  No prior issues with the hip, but POSITIVE hx of stepping on nail and formed painful callus on RIGHT foot and formed a hammer toe  Worse with walking and trouble sleeping on the RIGHT  Not taking pain meds for the hip    Family history of arthritis without any specific autoimmune disease mentioned     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   Respiratory: Negative for shortness of breath.    Cardiovascular: Negative for chest pain.   Gastrointestinal: Negative for nausea and vomiting.   Neurological: Negative for dizziness and headaches.        Objective:     /62 (BP Location: Left arm, Patient Position: Sitting, BP Cuff Size: Adult)   Pulse 88   Temp 37.2 °C (98.9 °F) (Temporal)   Resp 18   Ht 1.702 m (5' 7\")   Wt 68.9 kg (152 lb)   SpO2 98%   BMI 23.81 kg/m²      Physical Exam     HIP EXAM:  MINIMALLY antalgic gait    Right hip: Range of motion is slightly limited  POSITIVE pain with internal rotation  aimee's test is NEGATIVE  NO tenderness of the trochanteric bursa  NO tenderness of the gluteus medius  Naila's test is NEGATIVE    Left hip: Range of motion is intact  NEGATIVE pain with internal rotation  aimee's test is NEGATIVE  NO tenderness of the trochanteric bursa  NO tenderness of the gluteus medius  Naila's test is NEGATIVE     Assessment/Plan:     1. Osteoarthritis of one hip, right  DX-HIP-COMPLETE - UNILATERAL 2+ RIGHT    REFERRAL TO ORTHOPEDICS   2. 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      TECHNIQUE/EXAM DESCRIPTION AND NUMBER OF VIEWS:  2 views of the RIGHT hip.    COMPARISON: None    FINDINGS:  There is no evidence of fracture or dislocation. There is well-corticated bone fragment or ossicle identified along the right acetabulum. A similar smaller appearing ossification or calcification noted on the left. The joint spaces are preserved. The   joint spaces are symmetric. There is scoliosis and degenerative change in the lower lumbar spine.   Impression       No radiographic evidence of acute traumatic injury.    Mild degenerative change.    Scoliosis and degenerative change in the lower lumbar spine.     Interpreted in the office today with the patient    Thank you Antoni López PA-C for allowing me to participate in caring for the patient.

## 2019-10-22 ENCOUNTER — HOSPITAL ENCOUNTER (EMERGENCY)
Facility: MEDICAL CENTER | Age: 64
End: 2019-10-22
Attending: EMERGENCY MEDICINE
Payer: COMMERCIAL

## 2019-10-22 VITALS
BODY MASS INDEX: 25.01 KG/M2 | WEIGHT: 155.65 LBS | HEART RATE: 60 BPM | HEIGHT: 66 IN | DIASTOLIC BLOOD PRESSURE: 59 MMHG | TEMPERATURE: 96.8 F | RESPIRATION RATE: 12 BRPM | OXYGEN SATURATION: 96 % | SYSTOLIC BLOOD PRESSURE: 132 MMHG

## 2019-10-22 DIAGNOSIS — Z72.0 TOBACCO ABUSE: ICD-10-CM

## 2019-10-22 DIAGNOSIS — Z87.09 HISTORY OF COPD: ICD-10-CM

## 2019-10-22 DIAGNOSIS — R06.02 SHORTNESS OF BREATH: ICD-10-CM

## 2019-10-22 DIAGNOSIS — Z71.6 TOBACCO ABUSE COUNSELING: ICD-10-CM

## 2019-10-22 PROCEDURE — 99283 EMERGENCY DEPT VISIT LOW MDM: CPT

## 2019-10-22 RX ORDER — METHYLPREDNISOLONE 4 MG/1
TABLET ORAL
Qty: 1 KIT | Refills: 0 | Status: SHIPPED
Start: 2019-10-22 | End: 2020-03-07

## 2019-10-22 RX ORDER — ALBUTEROL SULFATE 90 UG/1
2 AEROSOL, METERED RESPIRATORY (INHALATION) EVERY 6 HOURS PRN
Qty: 8.5 G | Refills: 0 | Status: SHIPPED | OUTPATIENT
Start: 2019-10-22 | End: 2020-11-02 | Stop reason: SDUPTHER

## 2019-10-22 RX ORDER — METHYLPREDNISOLONE 4 MG/1
TABLET ORAL
Qty: 1 KIT | Refills: 0 | Status: SHIPPED | OUTPATIENT
Start: 2019-10-22 | End: 2019-10-22 | Stop reason: SDUPTHER

## 2019-10-22 RX ORDER — ALBUTEROL SULFATE 90 UG/1
2 AEROSOL, METERED RESPIRATORY (INHALATION) EVERY 6 HOURS PRN
Qty: 8.5 G | Refills: 0 | Status: SHIPPED | OUTPATIENT
Start: 2019-10-22 | End: 2019-10-22 | Stop reason: SDUPTHER

## 2019-10-22 NOTE — DISCHARGE INSTRUCTIONS
Find a family physician in [place] so that you can have your prescriptions refilled and you have someone to follow-up with that is closer to you.  Increase clear liquids, no dairy products for the next several days  Use your nebulizer as needed at home and your rescue inhaler when you are at work as needed.  Do not use your medications unless you are actually wheezing and having trouble breathing.  Stop smoking!!!  Take the steroids only if you are wheezing with food

## 2019-10-22 NOTE — ED TRIAGE NOTES
Pt ambulatory to triage c/o difficulty breathing x 2 days. Reports anxiety.   Denies respiratory hx, no chest pain.

## 2019-10-22 NOTE — ED PROVIDER NOTES
ED Provider Note     Scribed for Earnestine Montes D.O. by Sterling Rose. 10/22/2019, 2:51 AM.     Primary care provider: Pcp Pt States None  Means of arrival: walk in         History obtained from: patient  History limited by: none    CHIEF COMPLAINT  Chief Complaint   Patient presents with   • Difficulty Breathing     x 2 days       HPI  Adrián Sheldon is a 64 y.o. male who presents to the emergency Department after driving all the way from [place] with difficulty breathing onset 2 days ago. He notes associated anxiety. He notes that he is unable to sleep due to his symptoms. He used his nebulizer breathing treatment prior to arrival, which has moderately alleviated his difficulty breathing. The patient denies any cough. He states that he quit smoking for 5 months earlier this year, but started again recently. He smokes 0.5 packs/day. The patient lives in [place] and works in a coal mine. He notes a history of seizure and COPD.    REVIEW OF SYSTEMS  Pertinent positives include difficulty breathing, anxiety. Pertinent negatives include no cough.   See HPI for further details.    PAST MEDICAL HISTORY  Past Medical History:   Diagnosis Date   • Arthritis    • Bronchitis    • Cataract    • Hypertension        FAMILY HISTORY  Family History   Family history unknown: Yes       SOCIAL HISTORY  Social History     Tobacco Use   • Smoking status: Former Smoker     Packs/day: 0.50     Years: 18.00     Pack years: 9.00     Types: Cigarettes     Start date: 2000     Last attempt to quit: 2019     Years since quittin.6   • Smokeless tobacco: Never Used   Substance Use Topics   • Alcohol use: Yes     Comment: moderately    • Drug use: No      Social History     Substance and Sexual Activity   Drug Use No       SURGICAL HISTORY  History reviewed. No pertinent surgical history.    CURRENT MEDICATIONS  Current Outpatient Medications:   •  meloxicam (MOBIC) 7.5 MG Tab, Take 1 Tab by mouth 1 time daily as  "needed., Disp: 30 Tab, Rfl: 1  •  cyclobenzaprine (FLEXERIL) 5 MG tablet, Take 1-2 Tabs by mouth 3 times a day as needed. (Patient not taking: Reported on 5/23/2019), Disp: 30 Tab, Rfl: 0  •  MethylPREDNISolone (MEDROL DOSEPAK) 4 MG Tablet Therapy Pack, Use as directed (Patient not taking: Reported on 5/23/2019), Disp: 21 Tab, Rfl: 0  •  tamsulosin (FLOMAX) 0.4 MG capsule, Take 0.4 mg by mouth every evening., Disp: , Rfl:     ALLERGIES  No Known Allergies    PHYSICAL EXAM  VITAL SIGNS: /61   Pulse 60   Temp 36 °C (96.8 °F) (Oral)   Resp 17   Ht 1.676 m (5' 6\")   Wt 70.6 kg (155 lb 10.3 oz)   SpO2 97%   BMI 25.12 kg/m²     Constitutional: Patient is well developed, well nourished. Non-toxic appearing. No acute distress.   HENT: Normocephalic, atraumatic.  No cervical lymphadenopathy  Cardiovascular: Normal heart rate and Regular rhythm. No murmur  Thorax & Lungs: Clear and equal breath sounds with good excursion. No respiratory distress, no rhonchi, wheezing or rales.  Abdomen: Soft, nontender, no rebound or guarding.  Skin: Warm, Dry, No erythema, No rashes.   Neurologic: Alert & oriented x 3, normal motor and sensory function, normal deep tendon reflexes in the upper and lower extremities.  No lateralizing deficits.  Psychiatric: Affect very odd, Judgment normal, Mood normal.     COURSE & MEDICAL DECISION MAKING  Pertinent Labs & Imaging studies reviewed. (See chart for details)    2:51 AM - Patient seen and evaluated at bedside. Discussed the plan of care, including treatment of his symptoms with outpatient medicaitons. I also advised that the patient cease smoking. Patient verbalizes understanding and agreement to this plan of care.    Differential diagnoses include, but are not limited to, tobacco abuse, COPD  Patient will be given a prescription for albuterol inhaler and Medrol Dosepak.  He is instructed to get a physician in the [place] area so that he can be seen a little easier than driving " all the way into [place].  He is discharged in stable condition    The patient will return for new or worsening symptoms and is stable at the time of discharge.    DISPOSITION:  Patient will be discharged home in stable condition.    FOLLOW UP:  [facility]  [address]  [place] [id number]  [phone]          OUTPATIENT MEDICATIONS:  New Prescriptions    ALBUTEROL 108 (90 BASE) MCG/ACT AERO SOLN INHALATION AEROSOL    Inhale 2 Puffs by mouth every 6 hours as needed for Shortness of Breath.    METHYLPREDNISOLONE (MEDROL DOSEPAK) 4 MG TABLET THERAPY PACK    As directed with food         FINAL IMPRESSION  1. History of COPD    2. Shortness of breath    3. Tobacco abuse    4. Tobacco abuse counseling         Sterling ARCOS (Scribniya), am scribing for, and in the presence of, Earnestine Montes D.O..    Electronically signed by: Sterling Rose (Shay), 10/22/2019    IEarnestine D.O. personally performed the services described in this documentation, as scribed by Sterling Rose in my presence, and it is both accurate and complete. E    The note accurately reflects work and decisions made by me.  Earnestine Montes  10/22/2019  7:11 AM

## 2019-10-22 NOTE — ED NOTES
Pt found sleeping in chair in lobby with even and unlabored breaths. Woke pt up and [facility] [place] [address] without assistance, tolerated well. Pt placed in hospital gown and is now resting in bed with eyes closed, in no apparent distress. Will continue to monitor pt while awaiting orders.

## 2020-03-03 ENCOUNTER — HOSPITAL ENCOUNTER (OUTPATIENT)
Dept: LAB | Facility: MEDICAL CENTER | Age: 65
End: 2020-03-03
Attending: PHYSICIAN ASSISTANT
Payer: COMMERCIAL

## 2020-03-03 LAB — PSA SERPL-MCNC: 6.16 NG/ML (ref 0–4)

## 2020-03-03 PROCEDURE — 84153 ASSAY OF PSA TOTAL: CPT

## 2020-03-07 ENCOUNTER — APPOINTMENT (OUTPATIENT)
Dept: CARDIOLOGY | Facility: MEDICAL CENTER | Age: 65
End: 2020-03-07
Attending: FAMILY MEDICINE
Payer: COMMERCIAL

## 2020-03-07 ENCOUNTER — APPOINTMENT (OUTPATIENT)
Dept: RADIOLOGY | Facility: MEDICAL CENTER | Age: 65
End: 2020-03-07
Attending: EMERGENCY MEDICINE
Payer: COMMERCIAL

## 2020-03-07 ENCOUNTER — OFFICE VISIT (OUTPATIENT)
Dept: URGENT CARE | Facility: PHYSICIAN GROUP | Age: 65
End: 2020-03-07
Payer: COMMERCIAL

## 2020-03-07 ENCOUNTER — HOSPITAL ENCOUNTER (OUTPATIENT)
Facility: MEDICAL CENTER | Age: 65
End: 2020-03-08
Attending: EMERGENCY MEDICINE | Admitting: FAMILY MEDICINE
Payer: COMMERCIAL

## 2020-03-07 VITALS
TEMPERATURE: 98.4 F | DIASTOLIC BLOOD PRESSURE: 80 MMHG | HEART RATE: 65 BPM | OXYGEN SATURATION: 97 % | BODY MASS INDEX: 25.14 KG/M2 | WEIGHT: 165.9 LBS | RESPIRATION RATE: 16 BRPM | SYSTOLIC BLOOD PRESSURE: 138 MMHG | HEIGHT: 68 IN

## 2020-03-07 DIAGNOSIS — R07.9 ACUTE CHEST PAIN: ICD-10-CM

## 2020-03-07 DIAGNOSIS — R06.00 DYSPNEA, UNSPECIFIED TYPE: ICD-10-CM

## 2020-03-07 PROBLEM — R97.20 RAISED PROSTATE SPECIFIC ANTIGEN: Status: ACTIVE | Noted: 2020-03-03

## 2020-03-07 PROBLEM — N48.9 LESION OF PENIS: Status: ACTIVE | Noted: 2020-03-03

## 2020-03-07 PROBLEM — N13.8 BENIGN PROSTATIC HYPERPLASIA WITH URINARY OBSTRUCTION: Status: ACTIVE | Noted: 2020-03-03

## 2020-03-07 PROBLEM — J44.9 COPD (CHRONIC OBSTRUCTIVE PULMONARY DISEASE) (HCC): Status: ACTIVE | Noted: 2020-03-07

## 2020-03-07 PROBLEM — J45.909 ASTHMA WITHOUT STATUS ASTHMATICUS: Status: ACTIVE | Noted: 2020-03-07

## 2020-03-07 PROBLEM — R35.1 NOCTURIA: Status: ACTIVE | Noted: 2020-03-03

## 2020-03-07 PROBLEM — N40.1 BENIGN PROSTATIC HYPERPLASIA WITH URINARY OBSTRUCTION: Status: ACTIVE | Noted: 2020-03-03

## 2020-03-07 PROBLEM — R33.9 INCOMPLETE EMPTYING OF BLADDER: Status: ACTIVE | Noted: 2020-03-03

## 2020-03-07 LAB
ALBUMIN SERPL BCP-MCNC: 4.2 G/DL (ref 3.2–4.9)
ALBUMIN/GLOB SERPL: 1.9 G/DL
ALP SERPL-CCNC: 59 U/L (ref 30–99)
ALT SERPL-CCNC: 10 U/L (ref 2–50)
ANION GAP SERPL CALC-SCNC: 6 MMOL/L (ref 0–11.9)
AST SERPL-CCNC: 14 U/L (ref 12–45)
BASOPHILS # BLD AUTO: 0.8 % (ref 0–1.8)
BASOPHILS # BLD: 0.04 K/UL (ref 0–0.12)
BILIRUB SERPL-MCNC: 0.6 MG/DL (ref 0.1–1.5)
BUN SERPL-MCNC: 13 MG/DL (ref 8–22)
CALCIUM SERPL-MCNC: 9.3 MG/DL (ref 8.5–10.5)
CHLORIDE SERPL-SCNC: 107 MMOL/L (ref 96–112)
CO2 SERPL-SCNC: 24 MMOL/L (ref 20–33)
CREAT SERPL-MCNC: 0.82 MG/DL (ref 0.5–1.4)
D DIMER PPP IA.FEU-MCNC: <0.4 UG/ML (FEU) (ref 0–0.5)
EKG IMPRESSION: NORMAL
EKG IMPRESSION: NORMAL
EOSINOPHIL # BLD AUTO: 0.07 K/UL (ref 0–0.51)
EOSINOPHIL NFR BLD: 1.3 % (ref 0–6.9)
ERYTHROCYTE [DISTWIDTH] IN BLOOD BY AUTOMATED COUNT: 43.3 FL (ref 35.9–50)
GLOBULIN SER CALC-MCNC: 2.2 G/DL (ref 1.9–3.5)
GLUCOSE SERPL-MCNC: 103 MG/DL (ref 65–99)
HCT VFR BLD AUTO: 45.6 % (ref 42–52)
HGB BLD-MCNC: 15.8 G/DL (ref 14–18)
IMM GRANULOCYTES # BLD AUTO: 0.04 K/UL (ref 0–0.11)
IMM GRANULOCYTES NFR BLD AUTO: 0.8 % (ref 0–0.9)
LIPASE SERPL-CCNC: 9 U/L (ref 11–82)
LV EJECT FRACT  99904: 60
LV EJECT FRACT MOD 2C 99903: 72
LV EJECT FRACT MOD 4C 99902: 47.31
LV EJECT FRACT MOD BP 99901: 61.79
LYMPHOCYTES # BLD AUTO: 1.62 K/UL (ref 1–4.8)
LYMPHOCYTES NFR BLD: 31 % (ref 22–41)
MCH RBC QN AUTO: 32 PG (ref 27–33)
MCHC RBC AUTO-ENTMCNC: 34.6 G/DL (ref 33.7–35.3)
MCV RBC AUTO: 92.5 FL (ref 81.4–97.8)
MONOCYTES # BLD AUTO: 0.29 K/UL (ref 0–0.85)
MONOCYTES NFR BLD AUTO: 5.6 % (ref 0–13.4)
NEUTROPHILS # BLD AUTO: 3.16 K/UL (ref 1.82–7.42)
NEUTROPHILS NFR BLD: 60.5 % (ref 44–72)
NRBC # BLD AUTO: 0 K/UL
NRBC BLD-RTO: 0 /100 WBC
PLATELET # BLD AUTO: 192 K/UL (ref 164–446)
PMV BLD AUTO: 10.3 FL (ref 9–12.9)
POTASSIUM SERPL-SCNC: 4 MMOL/L (ref 3.6–5.5)
PROT SERPL-MCNC: 6.4 G/DL (ref 6–8.2)
RBC # BLD AUTO: 4.93 M/UL (ref 4.7–6.1)
SODIUM SERPL-SCNC: 137 MMOL/L (ref 135–145)
TROPONIN T SERPL-MCNC: 7 NG/L (ref 6–19)
TROPONIN T SERPL-MCNC: <6 NG/L (ref 6–19)
WBC # BLD AUTO: 5.2 K/UL (ref 4.8–10.8)

## 2020-03-07 PROCEDURE — 93306 TTE W/DOPPLER COMPLETE: CPT | Mod: 26 | Performed by: INTERNAL MEDICINE

## 2020-03-07 PROCEDURE — 700102 HCHG RX REV CODE 250 W/ 637 OVERRIDE(OP): Performed by: HOSPITALIST

## 2020-03-07 PROCEDURE — 93005 ELECTROCARDIOGRAM TRACING: CPT | Performed by: FAMILY MEDICINE

## 2020-03-07 PROCEDURE — 71045 X-RAY EXAM CHEST 1 VIEW: CPT

## 2020-03-07 PROCEDURE — G0378 HOSPITAL OBSERVATION PER HR: HCPCS

## 2020-03-07 PROCEDURE — 84484 ASSAY OF TROPONIN QUANT: CPT

## 2020-03-07 PROCEDURE — 93005 ELECTROCARDIOGRAM TRACING: CPT | Performed by: EMERGENCY MEDICINE

## 2020-03-07 PROCEDURE — 93000 ELECTROCARDIOGRAM COMPLETE: CPT | Performed by: NURSE PRACTITIONER

## 2020-03-07 PROCEDURE — 99285 EMERGENCY DEPT VISIT HI MDM: CPT

## 2020-03-07 PROCEDURE — 85025 COMPLETE CBC W/AUTO DIFF WBC: CPT

## 2020-03-07 PROCEDURE — A9270 NON-COVERED ITEM OR SERVICE: HCPCS | Performed by: HOSPITALIST

## 2020-03-07 PROCEDURE — 85379 FIBRIN DEGRADATION QUANT: CPT

## 2020-03-07 PROCEDURE — A9270 NON-COVERED ITEM OR SERVICE: HCPCS | Performed by: FAMILY MEDICINE

## 2020-03-07 PROCEDURE — 83690 ASSAY OF LIPASE: CPT

## 2020-03-07 PROCEDURE — 93010 ELECTROCARDIOGRAM REPORT: CPT | Performed by: INTERNAL MEDICINE

## 2020-03-07 PROCEDURE — 93306 TTE W/DOPPLER COMPLETE: CPT

## 2020-03-07 PROCEDURE — 700102 HCHG RX REV CODE 250 W/ 637 OVERRIDE(OP): Performed by: EMERGENCY MEDICINE

## 2020-03-07 PROCEDURE — A9270 NON-COVERED ITEM OR SERVICE: HCPCS | Performed by: EMERGENCY MEDICINE

## 2020-03-07 PROCEDURE — 80053 COMPREHEN METABOLIC PANEL: CPT

## 2020-03-07 PROCEDURE — 99214 OFFICE O/P EST MOD 30 MIN: CPT | Performed by: NURSE PRACTITIONER

## 2020-03-07 PROCEDURE — 99220 PR INITIAL OBSERVATION CARE,LEVL III: CPT | Performed by: FAMILY MEDICINE

## 2020-03-07 PROCEDURE — 700102 HCHG RX REV CODE 250 W/ 637 OVERRIDE(OP): Performed by: FAMILY MEDICINE

## 2020-03-07 RX ORDER — ACETAMINOPHEN 325 MG/1
650 TABLET ORAL EVERY 6 HOURS PRN
Status: DISCONTINUED | OUTPATIENT
Start: 2020-03-07 | End: 2020-03-08 | Stop reason: HOSPADM

## 2020-03-07 RX ORDER — ASPIRIN 81 MG/1
324 TABLET, CHEWABLE ORAL ONCE
OUTPATIENT
Start: 2020-03-07 | End: 2020-03-08

## 2020-03-07 RX ORDER — ENALAPRILAT 1.25 MG/ML
1.25 INJECTION INTRAVENOUS EVERY 6 HOURS PRN
Status: DISCONTINUED | OUTPATIENT
Start: 2020-03-07 | End: 2020-03-08 | Stop reason: HOSPADM

## 2020-03-07 RX ORDER — POLYETHYLENE GLYCOL 3350 17 G/17G
1 POWDER, FOR SOLUTION ORAL
Status: DISCONTINUED | OUTPATIENT
Start: 2020-03-07 | End: 2020-03-08 | Stop reason: HOSPADM

## 2020-03-07 RX ORDER — TAMSULOSIN HYDROCHLORIDE 0.4 MG/1
0.8 CAPSULE ORAL EVERY EVENING
Status: DISCONTINUED | OUTPATIENT
Start: 2020-03-07 | End: 2020-03-08 | Stop reason: HOSPADM

## 2020-03-07 RX ORDER — AMLODIPINE BESYLATE 2.5 MG/1
2.5 TABLET ORAL EVERY MORNING
Status: ON HOLD | COMMUNITY
End: 2020-03-08 | Stop reason: SDUPTHER

## 2020-03-07 RX ORDER — REGADENOSON 0.08 MG/ML
0.4 INJECTION, SOLUTION INTRAVENOUS
Status: COMPLETED | OUTPATIENT
Start: 2020-03-07 | End: 2020-03-08

## 2020-03-07 RX ORDER — MORPHINE SULFATE 4 MG/ML
4 INJECTION, SOLUTION INTRAMUSCULAR; INTRAVENOUS
Status: DISCONTINUED | OUTPATIENT
Start: 2020-03-07 | End: 2020-03-08 | Stop reason: HOSPADM

## 2020-03-07 RX ORDER — AMINOPHYLLINE 25 MG/ML
100 INJECTION, SOLUTION INTRAVENOUS
Status: DISCONTINUED | OUTPATIENT
Start: 2020-03-07 | End: 2020-03-08 | Stop reason: HOSPADM

## 2020-03-07 RX ORDER — AMLODIPINE BESYLATE 5 MG/1
2.5 TABLET ORAL EVERY MORNING
Status: DISCONTINUED | OUTPATIENT
Start: 2020-03-08 | End: 2020-03-08 | Stop reason: HOSPADM

## 2020-03-07 RX ORDER — ONDANSETRON 4 MG/1
4 TABLET, ORALLY DISINTEGRATING ORAL EVERY 4 HOURS PRN
Status: DISCONTINUED | OUTPATIENT
Start: 2020-03-07 | End: 2020-03-08 | Stop reason: HOSPADM

## 2020-03-07 RX ORDER — GUAIFENESIN 600 MG/1
600 TABLET, EXTENDED RELEASE ORAL EVERY 12 HOURS
Status: DISCONTINUED | OUTPATIENT
Start: 2020-03-07 | End: 2020-03-08 | Stop reason: HOSPADM

## 2020-03-07 RX ORDER — BISACODYL 10 MG
10 SUPPOSITORY, RECTAL RECTAL
Status: DISCONTINUED | OUTPATIENT
Start: 2020-03-07 | End: 2020-03-08 | Stop reason: HOSPADM

## 2020-03-07 RX ORDER — ASPIRIN 300 MG/1
300 SUPPOSITORY RECTAL DAILY
Status: DISCONTINUED | OUTPATIENT
Start: 2020-03-07 | End: 2020-03-08 | Stop reason: HOSPADM

## 2020-03-07 RX ORDER — BUDESONIDE AND FORMOTEROL FUMARATE DIHYDRATE 160; 4.5 UG/1; UG/1
2 AEROSOL RESPIRATORY (INHALATION) 2 TIMES DAILY
Status: DISCONTINUED | OUTPATIENT
Start: 2020-03-07 | End: 2020-03-08 | Stop reason: HOSPADM

## 2020-03-07 RX ORDER — OXYCODONE HYDROCHLORIDE 5 MG/1
5 TABLET ORAL
Status: DISCONTINUED | OUTPATIENT
Start: 2020-03-07 | End: 2020-03-08 | Stop reason: HOSPADM

## 2020-03-07 RX ORDER — TIOTROPIUM BROMIDE INHALATION SPRAY 3.12 UG/1
2 SPRAY, METERED RESPIRATORY (INHALATION) EVERY EVENING
COMMUNITY
End: 2020-03-07

## 2020-03-07 RX ORDER — ONDANSETRON 2 MG/ML
4 INJECTION INTRAMUSCULAR; INTRAVENOUS EVERY 4 HOURS PRN
Status: DISCONTINUED | OUTPATIENT
Start: 2020-03-07 | End: 2020-03-08 | Stop reason: HOSPADM

## 2020-03-07 RX ORDER — AMOXICILLIN 250 MG
2 CAPSULE ORAL 2 TIMES DAILY
Status: DISCONTINUED | OUTPATIENT
Start: 2020-03-07 | End: 2020-03-08 | Stop reason: HOSPADM

## 2020-03-07 RX ORDER — OXYCODONE HYDROCHLORIDE 10 MG/1
10 TABLET ORAL
Status: DISCONTINUED | OUTPATIENT
Start: 2020-03-07 | End: 2020-03-08 | Stop reason: HOSPADM

## 2020-03-07 RX ORDER — TIOTROPIUM BROMIDE INHALATION SPRAY 1.56 UG/1
2 SPRAY, METERED RESPIRATORY (INHALATION) EVERY EVENING
Status: SHIPPED | COMMUNITY
End: 2021-07-22

## 2020-03-07 RX ORDER — HYDROXYZINE PAMOATE 50 MG/1
CAPSULE ORAL
COMMUNITY
Start: 2019-12-24 | End: 2020-03-07

## 2020-03-07 RX ORDER — ASPIRIN 325 MG
325 TABLET ORAL DAILY
Status: DISCONTINUED | OUTPATIENT
Start: 2020-03-07 | End: 2020-03-08 | Stop reason: HOSPADM

## 2020-03-07 RX ORDER — ASPIRIN 81 MG/1
324 TABLET, CHEWABLE ORAL DAILY
Status: DISCONTINUED | OUTPATIENT
Start: 2020-03-07 | End: 2020-03-08 | Stop reason: HOSPADM

## 2020-03-07 RX ORDER — ALBUTEROL SULFATE 90 UG/1
2 AEROSOL, METERED RESPIRATORY (INHALATION) EVERY 6 HOURS PRN
Status: DISCONTINUED | OUTPATIENT
Start: 2020-03-07 | End: 2020-03-08 | Stop reason: HOSPADM

## 2020-03-07 RX ADMIN — GLYCOPYRROLATE 1 CAPSULE: 15.6 CAPSULE RESPIRATORY (INHALATION) at 18:34

## 2020-03-07 RX ADMIN — TAMSULOSIN HYDROCHLORIDE 0.8 MG: 0.4 CAPSULE ORAL at 18:34

## 2020-03-07 RX ADMIN — BUDESONIDE AND FORMOTEROL FUMARATE DIHYDRATE 2 PUFF: 160; 4.5 AEROSOL RESPIRATORY (INHALATION) at 18:34

## 2020-03-07 RX ADMIN — GUAIFENESIN 600 MG: 600 TABLET, EXTENDED RELEASE ORAL at 21:30

## 2020-03-07 RX ADMIN — LIDOCAINE HYDROCHLORIDE 30 ML: 20 SOLUTION OROPHARYNGEAL at 18:33

## 2020-03-07 ASSESSMENT — ENCOUNTER SYMPTOMS
DIZZINESS: 1
HEARTBURN: 0
WHEEZING: 0
NERVOUS/ANXIOUS: 0
SORE THROAT: 0
WEAKNESS: 0
BLURRED VISION: 0
BACK PAIN: 0
VOMITING: 0
ABDOMINAL PAIN: 0
NAUSEA: 0
FOCAL WEAKNESS: 0
FLANK PAIN: 0
DIARRHEA: 0
PALPITATIONS: 0
SPEECH CHANGE: 0
COUGH: 0
CHILLS: 0
SHORTNESS OF BREATH: 1
SENSORY CHANGE: 0
HEADACHES: 0
NECK PAIN: 0
FEVER: 0
DIAPHORESIS: 0

## 2020-03-07 ASSESSMENT — PATIENT HEALTH QUESTIONNAIRE - PHQ9
SUM OF ALL RESPONSES TO PHQ9 QUESTIONS 1 AND 2: 0
SUM OF ALL RESPONSES TO PHQ9 QUESTIONS 1 AND 2: 0
2. FEELING DOWN, DEPRESSED, IRRITABLE, OR HOPELESS: NOT AT ALL
1. LITTLE INTEREST OR PLEASURE IN DOING THINGS: NOT AT ALL
1. LITTLE INTEREST OR PLEASURE IN DOING THINGS: NOT AT ALL
2. FEELING DOWN, DEPRESSED, IRRITABLE, OR HOPELESS: NOT AT ALL

## 2020-03-07 ASSESSMENT — LIFESTYLE VARIABLES
DO YOU DRINK ALCOHOL: YES
HAVE YOU EVER FELT YOU SHOULD CUT DOWN ON YOUR DRINKING: NO
CONSUMPTION TOTAL: NEGATIVE
EVER HAD A DRINK FIRST THING IN THE MORNING TO STEADY YOUR NERVES TO GET RID OF A HANGOVER: NO
TOTAL SCORE: 0
ON A TYPICAL DAY WHEN YOU DRINK ALCOHOL HOW MANY DRINKS DO YOU HAVE: 0
TOTAL SCORE: 0
ALCOHOL_USE: NO
HAVE PEOPLE ANNOYED YOU BY CRITICIZING YOUR DRINKING: NO
HOW MANY TIMES IN THE PAST YEAR HAVE YOU HAD 5 OR MORE DRINKS IN A DAY: 0
TOTAL SCORE: 0
DOES PATIENT WANT TO STOP DRINKING: NO
AVERAGE NUMBER OF DAYS PER WEEK YOU HAVE A DRINK CONTAINING ALCOHOL: 0
EVER_SMOKED: YES
EVER FELT BAD OR GUILTY ABOUT YOUR DRINKING: NO

## 2020-03-07 ASSESSMENT — FIBROSIS 4 INDEX
FIB4 SCORE: 1.52
FIB4 SCORE: 1.14
FIB4 SCORE: 1.14

## 2020-03-07 NOTE — ED NOTES
Pharmacy Medication Reconciliation      Medication reconciliation updated and complete per pt at bedside & pt home pharmacy  Allergies have been verified  No oral ABX within the last 14 days  Pt home pharmacy:[facility]

## 2020-03-07 NOTE — ED PROVIDER NOTES
"ED Provider Note    CHIEF COMPLAINT  Chief Complaint   Patient presents with   • Chest Pain     sent from [facility] urgent care w 12 lead ekg done @12:33       HPI  Adrián Sheldon is a 64 y.o. male smoker with COPD versus asthma and hypertension who presents complaining of chest pain.    Patient states that he has had chest pain in the substernal/right upper chest for several weeks that has been intermittent.  He reports radiation into the right side of his neck.  Yesterday, the chest pain increased, as did his shortness of breath.  He went to urgent care in [place] and had a EKG that he was told was abnormal.  He was referred to the ER.  Patient reports associated nausea but denies vomiting, aggravating factors, alleviating factors, diaphoresis, fever, chills, sputum production, calf pain, leg swelling, history of similar symptoms.      Patient was told by his PCP that he required further studies of \"swollen blood vessels\" in his upper chest but he has not done this yet.    Patient denies history in self or family of TAD and PE.    Patient received aspirin at urgent care prior to arrival.    ALLERGIES  No Known Allergies    CURRENT MEDICATIONS  Home Medications     Reviewed by Estrada Irizarry R.N. (Registered Nurse) on 03/07/20 at 1258  Med List Status: <None>   Medication Last Dose Status   albuterol 108 (90 Base) MCG/ACT Aero Soln inhalation aerosol  Active   aspirin (ASA) chewable tab 324 mg  Active   Fluticasone Furoate-Vilanterol (BREO ELLIPTA INH)  Active   hydrOXYzine pamoate (VISTARIL) 50 MG Cap  Active   tamsulosin (FLOMAX) 0.4 MG capsule  Active   Tiotropium Bromide Monohydrate (SPIRIVA RESPIMAT) 2.5 MCG/ACT Aero Soln  Active                PAST MEDICAL HISTORY   has a past medical history of Arthritis, Asthma without status asthmaticus (3/7/2020), Bronchitis, Cataract, and Hypertension.    SURGICAL HISTORY  patient denies any surgical history    SOCIAL HISTORY  Social History     Tobacco Use   • " "Smoking status: Former Smoker     Packs/day: 0.50     Years: 18.00     Pack years: 9.00     Types: Cigarettes     Start date: 2000     Last attempt to quit: 2019     Years since quittin.0   • Smokeless tobacco: Never Used   Substance and Sexual Activity   • Alcohol use: Yes     Comment: moderately    • Drug use: No   • Sexual activity: Not on file     Quit smoking 4 months ago  Patient works as a       Family Hx:  Brother had an MI in his 40s  Both parents with coronary artery disease in their 60s  Denies history of TAD and PE/DVT      REVIEW OF SYSTEMS  See HPI for further details.  All other systems are negative except as above in HPI.      PHYSICAL EXAM  VITAL SIGNS: /64   Pulse 66   Temp 36.9 °C (98.4 °F) (Oral)   Resp 20   Ht 1.727 m (5' 8\")   Wt 75.3 kg (166 lb 0.1 oz)   SpO2 97%   BMI 25.24 kg/m²     General:  WDWN, nontoxic appearing in NAD; A+Ox3; V/S as above; hypertensive, afebrile, not hypoxic or tachycardic  Skin: warm and dry; good color; no rash  HEENT: NCAT; EOMs intact; PERRL; no scleral icterus   Neck: FROM; no meningismus, no LAD; no JVD  Cardiovascular: Regular heart rate and rhythm.  No murmurs, rubs, or gallops; pulses 2+ bilaterally radially and DP areas; no chest wall tenderness or crepitus  Lungs: Clear to auscultation with good air movement bilaterally.  No wheezes, rhonchi, or rales.   Abdomen: BS present; soft; NTND; no rebound, guarding, or rigidity.  No organomegaly or pulsatile mass; no mottling  Extremities: YEH x 4; no e/o trauma; no pedal edema; neg Toi's  Neurologic: CNs III-XII grossly intact; speech clear; distal sensation intact; strength 5/5 UE/LEs  Psychiatric: Appropriate affect, normal mood    LABS  Results for orders placed or performed during the hospital encounter of 20   CBC WITH DIFFERENTIAL   Result Value Ref Range    WBC 5.2 4.8 - 10.8 K/uL    RBC 4.93 4.70 - 6.10 M/uL    Hemoglobin 15.8 14.0 - 18.0 g/dL    Hematocrit 45.6 42.0 " - 52.0 %    MCV 92.5 81.4 - 97.8 fL    MCH 32.0 27.0 - 33.0 pg    MCHC 34.6 33.7 - 35.3 g/dL    RDW 43.3 35.9 - 50.0 fL    Platelet Count 192 164 - 446 K/uL    MPV 10.3 9.0 - 12.9 fL    Neutrophils-Polys 60.50 44.00 - 72.00 %    Lymphocytes 31.00 22.00 - 41.00 %    Monocytes 5.60 0.00 - 13.40 %    Eosinophils 1.30 0.00 - 6.90 %    Basophils 0.80 0.00 - 1.80 %    Immature Granulocytes 0.80 0.00 - 0.90 %    Nucleated RBC 0.00 /100 WBC    Neutrophils (Absolute) 3.16 1.82 - 7.42 K/uL    Lymphs (Absolute) 1.62 1.00 - 4.80 K/uL    Monos (Absolute) 0.29 0.00 - 0.85 K/uL    Eos (Absolute) 0.07 0.00 - 0.51 K/uL    Baso (Absolute) 0.04 0.00 - 0.12 K/uL    Immature Granulocytes (abs) 0.04 0.00 - 0.11 K/uL    NRBC (Absolute) 0.00 K/uL   CMP   Result Value Ref Range    Sodium 137 135 - 145 mmol/L    Potassium 4.0 3.6 - 5.5 mmol/L    Chloride 107 96 - 112 mmol/L    Co2 24 20 - 33 mmol/L    Anion Gap 6.0 0.0 - 11.9    Glucose 103 (H) 65 - 99 mg/dL    Bun 13 8 - 22 mg/dL    Creatinine 0.82 0.50 - 1.40 mg/dL    Calcium 9.3 8.5 - 10.5 mg/dL    AST(SGOT) 14 12 - 45 U/L    ALT(SGPT) 10 2 - 50 U/L    Alkaline Phosphatase 59 30 - 99 U/L    Total Bilirubin 0.6 0.1 - 1.5 mg/dL    Albumin 4.2 3.2 - 4.9 g/dL    Total Protein 6.4 6.0 - 8.2 g/dL    Globulin 2.2 1.9 - 3.5 g/dL    A-G Ratio 1.9 g/dL   LIPASE   Result Value Ref Range    Lipase 9 (L) 11 - 82 U/L   TROPONIN   Result Value Ref Range    Troponin T 7 6 - 19 ng/L   ESTIMATED GFR   Result Value Ref Range    GFR If African American >60 >60 mL/min/1.73 m 2    GFR If Non African American >60 >60 mL/min/1.73 m 2   EKG   Result Value Ref Range    Report       [facility] Emergency Dept.    Test Date:  2020  Pt Name:    REJI MAYA              Department: ER  MRN:        [id number]                      Room:       [facility]  Gender:     Male                         Technician: [id number]  :        1955                   Requested By:EMRE LOPEZ  Order #:     [id number]                    Reading MD: ANAI FLORES MD    Measurements  Intervals                                Axis  Rate:       60                           P:          -3  NY:         152                          QRS:        49  QRSD:       94                           T:          -8  QT:         412  QTc:        412    Interpretive Statements  SINUS RHYTHM  BORDERLINE T ABNORMALITIES, INFERIOR LEADS  Compared to ECG 11/22/2018 20:47:23  T-wave abnormality now present  Sinus bradycardia no longer present  Electronically Signed On 3-7-2020 14:10:37 PST by ANAI FLORES MD           IMAGING  DX-CHEST-PORTABLE (1 VIEW)   Final Result      No evidence of acute cardiopulmonary process.        MEDICAL RECORD  I have reviewed patient's medical record and pertinent results are listed below.      COURSE & MEDICAL DECISION MAKING  I have reviewed any medical record information, laboratory studies and radiographic results as noted.    Adrián Sheldon is a 64 y.o. male who presents complaining of chest pain which is substernal/right-sided radiating into the jaw.  Patient recently quit tobacco use.  No current chest pain.    Differential diagnosis includes ACS, musculoskeletal pain, COPD, GERD/esophageal spasm, biliary colic.  Patient has no risk factors for PE.  I do not suspect TAD.    EKG demonstrates no acute ST changes but T wave flattening when compared to prior EKG is present.    Labs demonstrate a negative troponin and no acute lab abnormalities.    Patient received aspirin prior to arrival.  He was given a GI cocktail here.    Patient will be hospitalized for stress testing and further work-up.  Patient is aware of this plan.    2:54 PM  I discussed the case with Dr. Bolanos who agrees to see pt.    FINAL IMPRESSION  1. Acute chest pain     2. Dyspnea, unspecified type         Electronically signed by: Anai Flores M.D., 3/7/2020 1:33 PM

## 2020-03-07 NOTE — H&P
Hospital Medicine History & Physical Note    Date of Service  3/7/2020    Primary Care Physician  Pcp Pt States None    Consultants  None    Code Status  Full    Chief Complaint  Chest pain    History of Presenting Illness  64 y.o. male who presented 3/7/2020 with chest pain.  Patient states he has been having intermittent chest pain for the past week, along with mild shortness of breath.  He denies having any diaphoresis or palpitations.  He has had some dizziness.  He denies having any abdominal pain, nausea or vomiting.  Noted more chest pain this morning and went to an urgent care, EKG was done and he was told to go to emergency room.  They also noticed blood pressure was elevated.  Does have known history of hypertension and has been compliant with taking his amlodipine.    Review of Systems  Review of Systems   Constitutional: Negative for chills, diaphoresis, fever and malaise/fatigue.   HENT: Negative for hearing loss and sore throat.    Eyes: Negative for blurred vision.   Respiratory: Positive for shortness of breath. Negative for cough and wheezing.    Cardiovascular: Negative for chest pain, palpitations and leg swelling.   Gastrointestinal: Negative for abdominal pain, diarrhea, heartburn, nausea and vomiting.   Genitourinary: Negative for dysuria, flank pain and hematuria.   Musculoskeletal: Negative for back pain and neck pain.   Skin: Negative for rash.   Neurological: Positive for dizziness. Negative for sensory change, speech change, focal weakness, weakness and headaches.   Psychiatric/Behavioral: The patient is not nervous/anxious.        Past Medical History   has a past medical history of Arthritis, Asthma without status asthmaticus (3/7/2020), Bronchitis, Cataract, COPD (chronic obstructive pulmonary disease) (HCC) (3/7/2020), and Hypertension.    Surgical History  None    Family History  Family history is unknown by patient.     Social History   reports that he quit smoking about a year ago.  His smoking use included cigarettes. He started smoking about 20 years ago. He has a 9.00 pack-year smoking history. He has never used smokeless tobacco. He reports current alcohol use. He reports that he does not use drugs.    Allergies  No Known Allergies    Medications  Prior to Admission Medications   Prescriptions Last Dose Informant Patient Reported? Taking?   Fluticasone Furoate-Vilanterol (BREO ELLIPTA) 200-25 MCG/INH AEROSOL POWDER, BREATH ACTIVATED 3/7/2020 at 0730 Patient Yes Yes   Sig: Inhale 1 Puff by mouth every morning.   Tiotropium Bromide Monohydrate (SPIRIVA RESPIMAT) 1.25 MCG/ACT Aero Soln 3/6/2020 at pm Patient Yes Yes   Sig: Inhale 2 Puffs by mouth every evening.   albuterol 108 (90 Base) MCG/ACT Aero Soln inhalation aerosol 3/7/2020 at 0730 Patient No No   Sig: Inhale 2 Puffs by mouth every 6 hours as needed for Shortness of Breath.   amLODIPine (NORVASC) 2.5 MG Tab 3/7/2020 at 0730 Patient's Home Pharmacy Yes Yes   Sig: Take 2.5 mg by mouth every morning.   tamsulosin (FLOMAX) 0.4 MG capsule 3/6/2020 at pm Patient Yes No   Sig: Take 0.8 mg by mouth every evening.      Facility-Administered Medications Last Administration Doses Remaining   aspirin (ASA) chewable tab 324 mg None recorded 1          Physical Exam  Temp:  [36.8 °C (98.3 °F)-36.9 °C (98.4 °F)] 36.8 °C (98.3 °F)  Pulse:  [66] 66  Resp:  [18-20] 18  BP: (144-154)/(64-68) 144/68  SpO2:  [97 %] 97 %    Physical Exam  HENT:      Head: Normocephalic and atraumatic.      Nose: No congestion.      Mouth/Throat:      Mouth: Mucous membranes are moist.   Eyes:      Conjunctiva/sclera: Conjunctivae normal.      Pupils: Pupils are equal, round, and reactive to light.   Neck:      Musculoskeletal: No muscular tenderness.   Cardiovascular:      Rate and Rhythm: Normal rate and regular rhythm.   Pulmonary:      Effort: Pulmonary effort is normal.      Breath sounds: Normal breath sounds.   Abdominal:      General: Bowel sounds are normal. There  is no distension.      Palpations: Abdomen is soft.      Tenderness: There is no abdominal tenderness. There is no guarding or rebound.   Musculoskeletal:      Right lower leg: No edema.      Left lower leg: No edema.   Lymphadenopathy:      Cervical: No cervical adenopathy.   Skin:     General: Skin is warm and dry.   Neurological:      General: No focal deficit present.      Mental Status: He is alert and oriented to person, place, and time.      Cranial Nerves: No cranial nerve deficit.         Laboratory:  Recent Labs     03/07/20  1300   WBC 5.2   RBC 4.93   HEMOGLOBIN 15.8   HEMATOCRIT 45.6   MCV 92.5   MCH 32.0   MCHC 34.6   RDW 43.3   PLATELETCT 192   MPV 10.3     Recent Labs     03/07/20  1422   SODIUM 137   POTASSIUM 4.0   CHLORIDE 107   CO2 24   GLUCOSE 103*   BUN 13   CREATININE 0.82   CALCIUM 9.3     Recent Labs     03/07/20  1422   ALTSGPT 10   ASTSGOT 14   ALKPHOSPHAT 59   TBILIRUBIN 0.6   LIPASE 9*   GLUCOSE 103*         No results for input(s): NTPROBNP in the last 72 hours.      Recent Labs     03/07/20  1422   TROPONINT 7       Urinalysis:    No results found     Imaging:  DX-CHEST-PORTABLE (1 VIEW)   Final Result      No evidence of acute cardiopulmonary process.      NM-CARDIAC STRESS TEST    (Results Pending)   EC-ECHOCARDIOGRAM COMPLETE W/O CONT    (Results Pending)         Assessment/Plan:  I anticipate this patient is appropriate for observation status at this time.    * Chest pain- (present on admission)  Assessment & Plan  Aspirin  Serial troponin, check lipid profile, check d-dimer  Check stress test and echocardiogram    COPD (chronic obstructive pulmonary disease) (HCC)- (present on admission)  Assessment & Plan  Continue fluticasone, Spiriva, RT protocol    HTN (hypertension)- (present on admission)  Assessment & Plan  Continue Norvasc  IV Vasotec as needed with parameters    Benign prostatic hyperplasia with urinary obstruction- (present on admission)  Assessment & Plan  Continue  Flomax        VTE prophylaxis: Lovenox

## 2020-03-07 NOTE — PROGRESS NOTES
Chief Complaint   Patient presents with   • Other     his right lung is burning an painful         HISTORY OF PRESENT ILLNESS: Patient is a 64 y.o. male, with a history of HTN and asthma, who presents to urgent care today with complaints of chest pain.  The patient notes over the past week he has had intermittent right-sided chest pain, worsening today.  Pain today is constant described as heavy and burning.  The pain radiates to the right side of his neck.  He admits to shortness of breath and nausea.  He denies any fever, cough, diaphoresis, congestion, leg pain, leg swelling.  He denies any injury or trauma.  He has been using his inhaler without much improvement.  He takes a daily medication for his HTN, cannot recall name or dose. Admits to being a former smoker.    Patient Active Problem List    Diagnosis Date Noted   • Asthma without status asthmaticus 03/07/2020   • Benign prostatic hyperplasia with urinary obstruction 03/03/2020   • Incomplete emptying of bladder 03/03/2020   • Lesion of penis 03/03/2020   • Nocturia 03/03/2020   • Raised prostate specific antigen 03/03/2020   • HTN (hypertension) 11/22/2018       Allergies:Patient has no known allergies.    Current Outpatient Medications Ordered in Epic   Medication Sig Dispense Refill   • Fluticasone Furoate-Vilanterol (BREO ELLIPTA INH) Inhale  by mouth.     • albuterol 108 (90 Base) MCG/ACT Aero Soln inhalation aerosol Inhale 2 Puffs by mouth every 6 hours as needed for Shortness of Breath. 8.5 g 0   • tamsulosin (FLOMAX) 0.4 MG capsule Take 0.4 mg by mouth every evening.     • hydrOXYzine pamoate (VISTARIL) 50 MG Cap TAKE 1 CAPSULE BY MOUTH 4 TIMES DAILY FOR 5 DAYS AS NEEDED FOR ANXIETY OR INSOMNIA     • Tiotropium Bromide Monohydrate (SPIRIVA RESPIMAT) 2.5 MCG/ACT Aero Soln 2 puff(s)       No current Epic-ordered facility-administered medications on file.        Past Medical History:   Diagnosis Date   • Arthritis    • Asthma without status  "asthmaticus 3/7/2020   • Bronchitis    • Cataract    • Hypertension        Social History     Tobacco Use   • Smoking status: Former Smoker     Packs/day: 0.50     Years: 18.00     Pack years: 9.00     Types: Cigarettes     Start date: 2000     Last attempt to quit: 2019     Years since quittin.0   • Smokeless tobacco: Never Used   Substance Use Topics   • Alcohol use: Yes     Comment: moderately    • Drug use: No       No family status information on file.     Family History   Family history unknown: Yes       ROS:  Review of Systems   Constitutional: Negative for fever, chills, weight loss, malaise, and fatigue.   HENT: Negative for ear pain, nosebleeds, congestion, sore throat.  Eyes: Negative for vision changes.   Neuro: Negative for headache, sensory changes, weakness, seizure, LOC.   Cardiovascular: Positive for chest pain with radiation to right side of neck.  Negative for palpitations, orthopnea and leg swelling.   Respiratory: Positive for shortness of breath.  Negative for cough, sputum production, and wheezing.   Gastrointestinal: Negative for abdominal pain, nausea, vomiting or diarrhea.   Genitourinary: Negative for dysuria, urgency and frequency.  Musculoskeletal: Negative for falls, back pain, joint pain, myalgias.   Skin: Negative for rash, diaphoresis.     Exam:  /80   Pulse 65   Temp 36.9 °C (98.4 °F) (Temporal)   Resp 16   Ht 1.727 m (5' 8\")   Wt 75.3 kg (165 lb 14.4 oz)   SpO2 97%   General: well-nourished, well-developed male in NAD  Head: normocephalic, atraumatic  Eyes: PERRLA, no conjunctival injection, acuity grossly intact, lids normal.  Ears: normal shape and symmetry, no tenderness, no discharge. External canals are without any significant edema or erythema. Tympanic membranes are without any inflammation, no effusion. Gross auditory acuity is intact.  Nose: symmetrical without tenderness, no discharge.  Mouth/Throat: reasonable hygiene, no erythema, exudates or " tonsillar enlargement.  Neck: no masses, range of motion within normal limits, no tracheal deviation. No obvious thyroid enlargement.   Lymph: no cervical adenopathy. No supraclavicular adenopathy.   Neuro: alert and oriented. Cranial nerves 1-12 grossly intact. No sensory deficit.   Cardiovascular: regular rate and rhythm. No edema.  Pulmonary: no distress. Chest is symmetrical with respiration, no wheezes, crackles, or rhonchi.   Musculoskeletal: no clubbing, appropriate muscle tone, gait is stable.  Skin: warm, dry, intact, no clubbing, no cyanosis, no rashes.   Psych: appropriate mood, affect, judgement.         12-lead study EKG interpretation, interpreted by myself.  The rhythm is sinus with a rate of 60.  There is no ectopy. There is questionable elevation in leads V1 through V6, compared to previous EKG from 2018 with changes.      Assessment/Plan:  1. Acute chest pain  EKG    aspirin (ASA) chewable tab 324 mg         At this time, I feel the patient requires a higher level of care in the ED for closer monitoring, stat lab work and/or imaging for further evaluation for his chest pain with abnormal EKG. This has been discussed with the patient and he states agreement and understanding. The patient is transported via EMS, no changes upon departure.  Patient is given 324 mg of aspirin prior to departure.  Report and care is handed off to paramedics.        Please note that this dictation was created using voice recognition software. I have made every reasonable attempt to correct obvious errors, but I expect that there are errors of grammar and possibly content that I did not discover before finalizing the note.      [employer].

## 2020-03-07 NOTE — PROGRESS NOTES
PT ARRIVED TO THE  UNIT ORIENTED TO ROOM DISCUSSED PLAN OF CARE  UPDATED ON PATIENT ARRIVAL TO ROOM BED IN LOW POSITION  CALL LIGHT WITHIN REACH NURSING HISTORY AND ASSESSMENT DONE CONDITION STABLE

## 2020-03-07 NOTE — ED TRIAGE NOTES
Pt has had chest pain x 7 days. Became very bad this morning 6/10, went to urgent care [place], 12 lead ekg was done and pt was advised to come here. 324mg aspirin given, pt arrives via ems only c/o chest pain 1/10 that radiates to right shoulder.

## 2020-03-07 NOTE — ASSESSMENT & PLAN NOTE
Aspirin  Serial troponin, check lipid profile, check d-dimer  Check stress test and echocardiogram

## 2020-03-08 ENCOUNTER — APPOINTMENT (OUTPATIENT)
Dept: RADIOLOGY | Facility: MEDICAL CENTER | Age: 65
End: 2020-03-08
Attending: FAMILY MEDICINE
Payer: COMMERCIAL

## 2020-03-08 VITALS
HEART RATE: 66 BPM | BODY MASS INDEX: 22.92 KG/M2 | DIASTOLIC BLOOD PRESSURE: 71 MMHG | RESPIRATION RATE: 16 BRPM | WEIGHT: 151.24 LBS | TEMPERATURE: 98.5 F | SYSTOLIC BLOOD PRESSURE: 159 MMHG | OXYGEN SATURATION: 95 % | HEIGHT: 68 IN

## 2020-03-08 PROBLEM — R07.9 CHEST PAIN: Status: RESOLVED | Noted: 2020-03-07 | Resolved: 2020-03-08

## 2020-03-08 LAB
ALBUMIN SERPL BCP-MCNC: 3.8 G/DL (ref 3.2–4.9)
ALBUMIN/GLOB SERPL: 1.9 G/DL
ALP SERPL-CCNC: 54 U/L (ref 30–99)
ALT SERPL-CCNC: 10 U/L (ref 2–50)
ANION GAP SERPL CALC-SCNC: 7 MMOL/L (ref 0–11.9)
AST SERPL-CCNC: 17 U/L (ref 12–45)
BASOPHILS # BLD AUTO: 0.7 % (ref 0–1.8)
BASOPHILS # BLD: 0.04 K/UL (ref 0–0.12)
BILIRUB SERPL-MCNC: 0.6 MG/DL (ref 0.1–1.5)
BUN SERPL-MCNC: 14 MG/DL (ref 8–22)
CALCIUM SERPL-MCNC: 9.1 MG/DL (ref 8.5–10.5)
CHLORIDE SERPL-SCNC: 107 MMOL/L (ref 96–112)
CHOLEST SERPL-MCNC: 167 MG/DL (ref 100–199)
CO2 SERPL-SCNC: 22 MMOL/L (ref 20–33)
CREAT SERPL-MCNC: 0.86 MG/DL (ref 0.5–1.4)
EOSINOPHIL # BLD AUTO: 0.16 K/UL (ref 0–0.51)
EOSINOPHIL NFR BLD: 2.9 % (ref 0–6.9)
ERYTHROCYTE [DISTWIDTH] IN BLOOD BY AUTOMATED COUNT: 44.3 FL (ref 35.9–50)
GLOBULIN SER CALC-MCNC: 2 G/DL (ref 1.9–3.5)
GLUCOSE SERPL-MCNC: 112 MG/DL (ref 65–99)
HCT VFR BLD AUTO: 45.5 % (ref 42–52)
HDLC SERPL-MCNC: 46 MG/DL
HGB BLD-MCNC: 15.1 G/DL (ref 14–18)
IMM GRANULOCYTES # BLD AUTO: 0.03 K/UL (ref 0–0.11)
IMM GRANULOCYTES NFR BLD AUTO: 0.6 % (ref 0–0.9)
LDLC SERPL CALC-MCNC: 104 MG/DL
LYMPHOCYTES # BLD AUTO: 1.74 K/UL (ref 1–4.8)
LYMPHOCYTES NFR BLD: 32 % (ref 22–41)
MCH RBC QN AUTO: 31.6 PG (ref 27–33)
MCHC RBC AUTO-ENTMCNC: 33.2 G/DL (ref 33.7–35.3)
MCV RBC AUTO: 95.2 FL (ref 81.4–97.8)
MONOCYTES # BLD AUTO: 0.38 K/UL (ref 0–0.85)
MONOCYTES NFR BLD AUTO: 7 % (ref 0–13.4)
NEUTROPHILS # BLD AUTO: 3.09 K/UL (ref 1.82–7.42)
NEUTROPHILS NFR BLD: 56.8 % (ref 44–72)
NRBC # BLD AUTO: 0 K/UL
NRBC BLD-RTO: 0 /100 WBC
PLATELET # BLD AUTO: 174 K/UL (ref 164–446)
PMV BLD AUTO: 10.3 FL (ref 9–12.9)
POTASSIUM SERPL-SCNC: 4.4 MMOL/L (ref 3.6–5.5)
PROT SERPL-MCNC: 5.8 G/DL (ref 6–8.2)
RBC # BLD AUTO: 4.78 M/UL (ref 4.7–6.1)
SODIUM SERPL-SCNC: 136 MMOL/L (ref 135–145)
TRIGL SERPL-MCNC: 87 MG/DL (ref 0–149)
TROPONIN T SERPL-MCNC: 6 NG/L (ref 6–19)
WBC # BLD AUTO: 5.4 K/UL (ref 4.8–10.8)

## 2020-03-08 PROCEDURE — 85025 COMPLETE CBC W/AUTO DIFF WBC: CPT

## 2020-03-08 PROCEDURE — 99217 PR OBSERVATION CARE DISCHARGE: CPT | Performed by: FAMILY MEDICINE

## 2020-03-08 PROCEDURE — 84484 ASSAY OF TROPONIN QUANT: CPT

## 2020-03-08 PROCEDURE — 80061 LIPID PANEL: CPT

## 2020-03-08 PROCEDURE — 700102 HCHG RX REV CODE 250 W/ 637 OVERRIDE(OP): Performed by: FAMILY MEDICINE

## 2020-03-08 PROCEDURE — G0378 HOSPITAL OBSERVATION PER HR: HCPCS

## 2020-03-08 PROCEDURE — 700111 HCHG RX REV CODE 636 W/ 250 OVERRIDE (IP)

## 2020-03-08 PROCEDURE — A9502 TC99M TETROFOSMIN: HCPCS

## 2020-03-08 PROCEDURE — 80053 COMPREHEN METABOLIC PANEL: CPT

## 2020-03-08 PROCEDURE — A9270 NON-COVERED ITEM OR SERVICE: HCPCS | Performed by: FAMILY MEDICINE

## 2020-03-08 RX ORDER — AMLODIPINE BESYLATE 2.5 MG/1
5 TABLET ORAL EVERY MORNING
Qty: 30 TAB | Refills: 2 | Status: SHIPPED | OUTPATIENT
Start: 2020-03-08 | End: 2020-04-07

## 2020-03-08 RX ORDER — REGADENOSON 0.08 MG/ML
INJECTION, SOLUTION INTRAVENOUS
Status: COMPLETED
Start: 2020-03-08 | End: 2020-03-08

## 2020-03-08 RX ADMIN — GLYCOPYRROLATE 1 CAPSULE: 15.6 CAPSULE RESPIRATORY (INHALATION) at 06:12

## 2020-03-08 RX ADMIN — REGADENOSON 0.4 MG: 0.08 INJECTION, SOLUTION INTRAVENOUS at 08:16

## 2020-03-08 RX ADMIN — ASPIRIN 325 MG: 325 TABLET, FILM COATED ORAL at 06:11

## 2020-03-08 RX ADMIN — BUDESONIDE AND FORMOTEROL FUMARATE DIHYDRATE 2 PUFF: 160; 4.5 AEROSOL RESPIRATORY (INHALATION) at 06:12

## 2020-03-08 RX ADMIN — AMLODIPINE BESYLATE 2.5 MG: 5 TABLET ORAL at 06:10

## 2020-03-08 NOTE — PROGRESS NOTES
Received bedside report from  RN. Assumed pt care. Received pt in bed awake, AOx4, HR:70S-80s, denies any chest pain at the moment, no signs of distress. Plan of care reviewed and understood by patient. Encouraged to verbalize feelings. Reinforced use of call light. Will continue to monitor.

## 2020-03-08 NOTE — PROGRESS NOTES
AM labs and serial Troponin drawn and sent to lab. Patient no complaints, denies any chest pain at this time.

## 2020-03-08 NOTE — DISCHARGE INSTRUCTIONS
Discharge Instructions    Discharged to home by car with relative. Discharged via walking, hospital escort: Yes.  Special equipment needed: Not Applicable    Be sure to schedule a follow-up appointment with your primary care doctor or any specialists as instructed.     Discharge Plan:   Diet Plan: Discussed  Activity Level: Discussed  Smoking Cessation Offered: Patient Counseled  Confirmed Follow up Appointment: Patient to Call and Schedule Appointment  Confirmed Symptoms Management: Discussed  Medication Reconciliation Updated: Yes  Influenza Vaccine Indication: Patient Refuses    I understand that a diet low in cholesterol, fat, and sodium is recommended for good health. Unless I have been given specific instructions below for another diet, I accept this instruction as my diet prescription.   Other diet: Heart Healthy     Special Instructions: None    · Is patient discharged on Warfarin / Coumadin?   No     Depression / Suicide Risk    As you are discharged from this [facility] facility, it is important to learn how to keep safe from harming yourself.    Recognize the warning signs:  · Abrupt changes in personality, positive or negative- including increase in energy   · Giving away possessions  · Change in eating patterns- significant weight changes-  positive or negative  · Change in sleeping patterns- unable to sleep or sleeping all the time   · Unwillingness or inability to communicate  · Depression  · Unusual sadness, discouragement and loneliness  · Talk of wanting to die  · Neglect of personal appearance   · Rebelliousness- reckless behavior  · Withdrawal from people/activities they love  · Confusion- inability to concentrate     If you or a loved one observes any of these behaviors or has concerns about self-harm, here's what you can do:  · Talk about it- your feelings and reasons for harming yourself  · Remove any means that you might use to hurt yourself (examples: pills, rope, extension cords,  firearm)  · Get professional help from the community (Mental Health, Substance Abuse, psychological counseling)  · Do not be alone:Call your Safe Contact- someone whom you trust who will be there for you.  · Call your local CRISIS HOTLINE 079-1750 or 281-478-9077  · Call your local Children's Mobile Crisis Response Team [place] (374) 956-4650 or www.[employer]  · Call the toll free National Suicide Prevention Hotlines   · National Suicide Prevention Lifeline 947-902-NCSJ (9325)  · National [employer] Line Network 800-SUICIDE (357-9098)              FOLLOW UP ITEMS POST DISCHARGE     Discharge Instructions per Ivana Cabrera, A.P.R.N.  -Follow up with PCP  -Increased dose of Norvasc: from 2.5mg to 5mg tab PO daily  -Check you BP daily     DIET: cardiac     ACTIVITY: as tolerated     DIAGNOSIS: chest pain

## 2020-03-08 NOTE — DISCHARGE SUMMARY
Discharge Summary    CHIEF COMPLAINT ON ADMISSION  Chief Complaint   Patient presents with   • Chest Pain     sent from [facility] urgent care w 12 lead ekg done @12:33       Reason for Admission  ems     Admission Date  3/7/2020    CODE STATUS  Full Code    HPI & HOSPITAL COURSE  Mr. Sheldon is a 64 y.o. male who presented 3/7/2020 with chest pain.  Patient states he has been having intermittent chest pain for the past week, along with mild shortness of breath.  He denies having any diaphoresis or palpitations.  He has had some dizziness.  He denies having any abdominal pain, nausea or vomiting.  Noted more chest pain this morning and went to an urgent care, EKG was done and he was told to go to emergency room.  They also noticed blood pressure was elevated.  Does have known history of hypertension and has been compliant with taking his amlodipine. Patient admitted to observation unit for further evaluation and treatment.     During this hospital stay, patient's serial cardiac troponin negative.  A cardiac echocardiogram obtained with results of normal left ventricular systolic function, left ventricular ejection fraction is visually estimated to be 60%.  Cardiac stress reports normal left ventricular size, ejection fraction, and wall motion. During this stay, patient's SBP averaging 150s. Patient's BP medication will be adjusted. Patient HIGHLY recommended to follow up with PCP for BP management along with COPD management. All questions and concerns answered prior to being d/c. Patient d/c home.       Therefore, he is discharged in good and stable condition to home with close outpatient follow-up.    The patient recovered much more quickly than anticipated on admission.    Discharge Date  03/08/20    FOLLOW UP ITEMS POST DISCHARGE  Please call [phone] to schedule PCP appointment for patient.    Required specialty appointments include:       Discharge Instructions per Ivana Cabrera  A.P.R.N.  -Follow up with PCP  -Increased dose of Norvasc: from 2.5mg to 5mg tab PO daily  -Check you BP daily    DIET: cardiac    ACTIVITY: as tolerated    DIAGNOSIS: chest pain    Return to ER if symptoms worsens, chest pain, palpitations, shortness of breath, numbness, tingling and weakness.       DISCHARGE DIAGNOSES  Principal Problem (Resolved):    Chest pain POA: Yes  Active Problems:    HTN (hypertension) POA: Yes    COPD (chronic obstructive pulmonary disease) (HCC) POA: Yes    Benign prostatic hyperplasia with urinary obstruction POA: Yes      FOLLOW UP  No future appointments.  No follow-up provider specified.    MEDICATIONS ON DISCHARGE     Medication List      CHANGE how you take these medications      Instructions   amLODIPine 2.5 MG Tabs  What changed:  how much to take  Commonly known as:  NORVASC   Take 2 Tabs by mouth every morning for 30 days.  Dose:  5 mg        CONTINUE taking these medications      Instructions   albuterol 108 (90 Base) MCG/ACT Aers inhalation aerosol   Doctor's comments:  With spacer  Inhale 2 Puffs by mouth every 6 hours as needed for Shortness of Breath.  Dose:  2 Puff     Breo Ellipta 200-25 MCG/INH Aepb  Generic drug:  Fluticasone Furoate-Vilanterol   Inhale 1 Puff by mouth every morning.  Dose:  1 Puff     Spiriva Respimat 1.25 MCG/ACT Aers  Generic drug:  Tiotropium Bromide Monohydrate   Inhale 2 Puffs by mouth every evening.  Dose:  2 Puff     tamsulosin 0.4 MG capsule  Commonly known as:  FLOMAX   Take 0.8 mg by mouth every evening.  Dose:  0.8 mg            Allergies  No Known Allergies    DIET  Orders Placed This Encounter   Procedures   • Diet Order Cardiac     Standing Status:   Standing     Number of Occurrences:   1     Order Specific Question:   Diet:     Answer:   Cardiac [6]     Order Specific Question:   Miscellaneous modifications:     Answer:   No Decaf, No Caffeine(for test) [11]       ACTIVITY  As tolerated.  Weight bearing as  tolerated    CONSULTATIONS  NONE    PROCEDURES  NONE    IMAGING  NM-CARDIAC STRESS TEST   Final Result      EC-ECHOCARDIOGRAM COMPLETE W/O CONT   Final Result      DX-CHEST-PORTABLE (1 VIEW)   Final Result      No evidence of acute cardiopulmonary process.            LABORATORY  Lab Results   Component Value Date    SODIUM 136 03/08/2020    POTASSIUM 4.4 03/08/2020    CHLORIDE 107 03/08/2020    CO2 22 03/08/2020    GLUCOSE 112 (H) 03/08/2020    BUN 14 03/08/2020    CREATININE 0.86 03/08/2020        Lab Results   Component Value Date    WBC 5.4 03/08/2020    HEMOGLOBIN 15.1 03/08/2020    HEMATOCRIT 45.5 03/08/2020    PLATELETCT 174 03/08/2020        Total time of the discharge process exceeds 36 minutes.  ----------------------------------------------------------------------------------------------------------------------------------------------------------------------------------------------------------------------------------------------  Please note that this dictation was created using voice recognition software. I have made every reasonable attempt to correct obvious errors, but there may be errors of grammar and possibly content that I did not discover before finalizing the note.    Electronically signed by:  DOMINIQUE Cabrera, MSN, APRN, FNP-C  Hospitalist Services  [facility]  [phone]  [email or web address]  03/08/20     [id number]

## 2020-03-31 ENCOUNTER — APPOINTMENT (OUTPATIENT)
Dept: RADIOLOGY | Facility: IMAGING CENTER | Age: 65
End: 2020-03-31
Attending: PHYSICIAN ASSISTANT
Payer: COMMERCIAL

## 2020-03-31 ENCOUNTER — OFFICE VISIT (OUTPATIENT)
Dept: URGENT CARE | Facility: PHYSICIAN GROUP | Age: 65
End: 2020-03-31
Payer: COMMERCIAL

## 2020-03-31 VITALS
BODY MASS INDEX: 23.11 KG/M2 | OXYGEN SATURATION: 98 % | DIASTOLIC BLOOD PRESSURE: 74 MMHG | WEIGHT: 152 LBS | RESPIRATION RATE: 16 BRPM | SYSTOLIC BLOOD PRESSURE: 160 MMHG | HEART RATE: 76 BPM | TEMPERATURE: 98.4 F

## 2020-03-31 DIAGNOSIS — R07.9 CHEST PAIN, UNSPECIFIED TYPE: ICD-10-CM

## 2020-03-31 DIAGNOSIS — R07.89 CHEST WALL PAIN: ICD-10-CM

## 2020-03-31 PROCEDURE — 99214 OFFICE O/P EST MOD 30 MIN: CPT | Performed by: PHYSICIAN ASSISTANT

## 2020-03-31 PROCEDURE — 71046 X-RAY EXAM CHEST 2 VIEWS: CPT | Mod: TC,FY | Performed by: PHYSICIAN ASSISTANT

## 2020-03-31 PROCEDURE — 93000 ELECTROCARDIOGRAM COMPLETE: CPT | Performed by: PHYSICIAN ASSISTANT

## 2020-03-31 ASSESSMENT — ENCOUNTER SYMPTOMS
FEVER: 0
CHILLS: 0
PALPITATIONS: 0

## 2020-03-31 ASSESSMENT — FIBROSIS 4 INDEX: FIB4 SCORE: 2.07

## 2020-03-31 ASSESSMENT — PAIN SCALES - GENERAL: PAINLEVEL: 4=SLIGHT-MODERATE PAIN

## 2020-03-31 NOTE — PROGRESS NOTES
Subjective:      Ardián Sheldon is a 65 y.o. male who presents with Chest Pain (R side/ pt was here 3wks ago for the same issues/ )            Chest Pain    Pertinent negatives include no fever or palpitations.   The patient is a 65-year-old male who presents to the clinic complaining of chest pain located to his right side.  He was here in the urgent care 3 weeks ago for the same issues and was sent to the emergency room for further evaluation.  He was admitted for observation and had negative troponin, negative d-dimer.  Per hospital course note, throughout stay he had negative serial cardiac troponin, cardiac echocardiogram was normal as well as nuclear med cardiac stress test.     Patient states he has a history of asthma and essential hypertension.  He takes amlodipine, albuterol, Spiriva and Breo.     His chest pain has continued located to his right mid chest.  Describes as a burning. Intermittent.  He said he talked to his pulmonologist and states could be a muscle, food that he ate, could be lung cancer, and we will follow-up with him in 6 months.  Patient states he is concerned of lung cancer considering his long history of smoking.  He quit smoking 6 to 8 months ago.     Denies any injury or change in physical activity.  He has not noticed anything that exacerbates his chest pain.  He states occasionally he has radiation of the pain and normally gets cramps to his arms and legs and sometimes a warm sensation in his right face.  He denies any dizziness, difficulty breathing, cough, fevers, chills, abdominal pain, nausea, vomiting, diarrhea.  Denies any vision changes or weakness.    No personal history of heart disease. Strong family history of heart attacks.   No other pertinent past medical history.    Review of Systems   Constitutional: Negative for chills and fever.   Cardiovascular: Positive for chest pain. Negative for palpitations and leg swelling.     See HPI for further ROS     Objective:     BP  160/74   Pulse 76   Temp 36.9 °C (98.4 °F) (Temporal)   Resp 16   Wt 68.9 kg (152 lb)   SpO2 98%   BMI 23.11 kg/m²      Physical Exam  Vitals signs reviewed.   Constitutional:       General: He is not in acute distress.     Appearance: Normal appearance. He is not ill-appearing or toxic-appearing.   HENT:      Nose: Nose normal.      Mouth/Throat:      Mouth: Mucous membranes are moist.      Pharynx: No oropharyngeal exudate or posterior oropharyngeal erythema.   Eyes:      Conjunctiva/sclera: Conjunctivae normal.      Pupils: Pupils are equal, round, and reactive to light.   Neck:      Musculoskeletal: Normal range of motion and neck supple. No neck rigidity.   Cardiovascular:      Rate and Rhythm: Normal rate and regular rhythm.      Heart sounds: Normal heart sounds.   Pulmonary:      Effort: Pulmonary effort is normal. No respiratory distress.      Breath sounds: Normal breath sounds. No wheezing, rhonchi or rales.   Chest:      Chest wall: No mass.      Breasts: Breasts are symmetrical.       Lymphadenopathy:      Upper Body:      Right upper body: No supraclavicular adenopathy.      Left upper body: No supraclavicular adenopathy.   Skin:     General: Skin is warm and dry.      Capillary Refill: Capillary refill takes less than 2 seconds.   Neurological:      General: No focal deficit present.      Mental Status: He is alert and oriented to person, place, and time.   Psychiatric:         Mood and Affect: Mood normal.         Behavior: Behavior normal.             Past Medical History:   Diagnosis Date   • Arthritis    • Asthma without status asthmaticus 3/7/2020   • Bronchitis    • Cataract    • COPD (chronic obstructive pulmonary disease) ([facility]) 3/7/2020   • Hypertension     No past surgical history on file.   Social History     Socioeconomic History   • Marital status:      Spouse name: Not on file   • Number of children: Not on file   • Years of education: Not on file   • Highest education level:  Not on file   Occupational History   • Not on file   Social Needs   • Financial resource strain: Not on file   • Food insecurity     Worry: Not on file     Inability: Not on file   • Transportation needs     Medical: Not on file     Non-medical: Not on file   Tobacco Use   • Smoking status: Former Smoker     Packs/day: 0.50     Years: 18.00     Pack years: 9.00     Types: Cigarettes     Start date: 2000     Last attempt to quit: 2019     Years since quittin.0   • Smokeless tobacco: Never Used   Substance and Sexual Activity   • Alcohol use: Yes     Comment: moderately    • Drug use: No   • Sexual activity: Not on file   Lifestyle   • Physical activity     Days per week: Not on file     Minutes per session: Not on file   • Stress: Not on file   Relationships   • Social connections     Talks on phone: Not on file     Gets together: Not on file     Attends [employer] service: Not on file     Active member of club or organization: Not on file     Attends meetings of clubs or organizations: Not on file     Relationship status: Not on file   • Intimate partner violence     Fear of current or ex partner: Not on file     Emotionally abused: Not on file     Physically abused: Not on file     Forced sexual activity: Not on file   Other Topics Concern   • Not on file   Social History Narrative   • Not on file    Patient has no known allergies.    EKG Interpretation:  Interpreted by me    Rhythm:  Sinus rhythm   Rate: 66  Conduction: normal  ST Segments: no acute change  T Waves: no acute change  Q Waves: none  Clinical Impression: Left ventricular hypertrophy. Abnormal EKG without ST-T wave changes       COMPARISON:  2020     FINDINGS:  The heart is normal in size.  No pulmonary infiltrates or consolidations are noted.  No pleural effusions are appreciated.  No rib fracture or pneumothorax identified.     IMPRESSION:     No active disease.    Assessment/Plan:     1. Chest pain, unspecified type    - EKG -  Clinic Performed  - DX-CHEST-2 VIEWS; Above   - REFERRAL TO FAMILY PRACTICE  - REFERRAL TO PULMONOLOGY    2. Chest wall pain    - REFERRAL TO PULMONOLOGY    Discussed with patient his signs and symptoms most likely musculoskeletal etiology as evident by obvious reproduction of pain with palpation.  Suspicions for cardiac etiology are low considering recent admission to the hospital for observation, negative serial cardiac troponins, normal nuclear med stress test, and normal echo.  EKG and chest x-ray were unremarkable today in the clinic.     Patient states he is concerned of lung cancer and wants to be checked to see if he has lung cancer considering his history of smoking.  He had stopped smoking 6 to 8 months ago.  Discussed with patient his x-ray does not rule out lung cancer alone and he would need further evaluation for this.  CT chest not indicated right now.  He is going to follow-up with his primary care physician.  We will refer to new pulmonologist as he does not desire to go to his former pulmonologist.     Discussed treatment of ibuprofen and Tylenol alternating every 4 hours, heat, range of motion exercises of upper extremities, stretches.     The patient is overall well-appearing, stable, and in no acute distress.  Concerns for emergent pathology of symptoms are low as of right now in the clinic.     Advised the patient to call 911 or present to the emergency room with any worsening chest pain, shortness of breath, cough, fevers, chills, abdominal pain, vomiting or any other concerns.     Supportive care, differential diagnoses, and indications for immediate follow-up discussed with patient.    Pathogenesis of diagnosis discussed including typical length and natural progression. Patient expresses understanding and agrees to plan.    Please note that this dictation was created using voice recognition software. I have made every reasonable attempt to correct obvious errors, but I expect that there are  errors of grammar and possibly content that I did not discover before finalizing the note.

## 2020-04-09 ENCOUNTER — TELEPHONE (OUTPATIENT)
Dept: PULMONOLOGY | Facility: HOSPICE | Age: 65
End: 2020-04-09

## 2020-04-09 ENCOUNTER — DOCUMENTATION (OUTPATIENT)
Dept: PULMONOLOGY | Facility: HOSPICE | Age: 65
End: 2020-04-09

## 2020-04-09 NOTE — TELEPHONE ENCOUNTER
Patient calling trying to schedule a new patient appointment for Pulmonary. There is a referral in the account from Rafita KAUFMAN at [facility] from 3/31/20. However the  re was problem with the diagnosis: chest pain, chest wall pain. Phone call routed to our new patient  ( I do see previous note sent to that desk regarding diagnosis)    Message routed for help, it may not be OUR new patient  that will handle this but she will know whom to ask.

## 2020-04-15 ENCOUNTER — TELEPHONE (OUTPATIENT)
Dept: HEALTH INFORMATION MANAGEMENT | Facility: OTHER | Age: 65
End: 2020-04-15

## 2020-04-15 NOTE — TELEPHONE ENCOUNTER
1. Caller Name:Adrián Sheldon                        Call Back Number:[phone]  [facility] PCP or Specialty Provider: No         2.  Does patient have any active symptoms of respiratory illness (fever OR cough OR shortness of breath OR sore throat)? Yes, the patient reports the following respiratory symptoms: cough and shortness of breath.Chronic  3.  Does patient have any comoribidities? Asthma    4.  Has the patient traveled in the last 14 days OR had any known contact with someone who is suspected or confirmed to have COVID-19?  No.    5. Disposition: Cleared by RN Triage; OK to keep/schedule appointment    Note routed to [facility] Provider: JASMIN only.

## 2020-04-16 ENCOUNTER — APPOINTMENT (OUTPATIENT)
Dept: PULMONOLOGY | Facility: HOSPICE | Age: 65
End: 2020-04-16
Payer: COMMERCIAL

## 2020-04-20 ENCOUNTER — OFFICE VISIT (OUTPATIENT)
Dept: PULMONOLOGY | Facility: HOSPICE | Age: 65
End: 2020-04-20
Payer: COMMERCIAL

## 2020-04-20 VITALS
DIASTOLIC BLOOD PRESSURE: 82 MMHG | HEART RATE: 66 BPM | HEIGHT: 66 IN | TEMPERATURE: 97.9 F | SYSTOLIC BLOOD PRESSURE: 156 MMHG | WEIGHT: 152 LBS | RESPIRATION RATE: 14 BRPM | BODY MASS INDEX: 24.43 KG/M2 | OXYGEN SATURATION: 98 %

## 2020-04-20 DIAGNOSIS — R07.82 INTERCOSTAL PAIN: ICD-10-CM

## 2020-04-20 DIAGNOSIS — J45.40 MODERATE PERSISTENT ASTHMA WITHOUT STATUS ASTHMATICUS WITHOUT COMPLICATION: ICD-10-CM

## 2020-04-20 DIAGNOSIS — J44.9 CHRONIC OBSTRUCTIVE PULMONARY DISEASE, UNSPECIFIED COPD TYPE (HCC): ICD-10-CM

## 2020-04-20 PROCEDURE — 99204 OFFICE O/P NEW MOD 45 MIN: CPT | Performed by: INTERNAL MEDICINE

## 2020-04-20 RX ORDER — AMOXICILLIN AND CLAVULANATE POTASSIUM 875; 125 MG/1; MG/1
TABLET, FILM COATED ORAL
COMMUNITY
Start: 2020-04-02 | End: 2021-06-30

## 2020-04-20 ASSESSMENT — FIBROSIS 4 INDEX: FIB4 SCORE: 2.07

## 2020-04-20 ASSESSMENT — PAIN SCALES - GENERAL: PAINLEVEL: 6=MODERATE PAIN

## 2020-04-20 NOTE — PATIENT INSTRUCTIONS
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  He saw his primary care doctor, was seen by telemedicine regarding his asthma and chest pain but did not have resolution of either symptoms or of his complaints, evaluation was by telemedicine and he came in personally today for full evaluation.    He underwent lung function testing at [facility] 3 months ago, we have requested those records.  We have also asked for the telemedicine record from Dr. Kasper.     He does have reactive airways, controlled presently with Breo and pro-air.  He is no longer using Spiriva.    Most importantly, he has a 30-pack-year smoking history, stopped smoking within the last 6 months, and is very concerned because he has right chest wall pain which is uncomfortable, positional, not explained by plain x-ray done recently and I have ordered a chest CT scan with contrast given the smoking history and the significant right chest discomfort.  It seems to be musculoskeletal but we will need to exclude a malignancy in the region that might be triggering this.    He is also undergoing evaluation for nasal blockage, ENT evaluation is upcoming.  We will look at the imaging, continue the inhalers, obtain the old lung function testing and then see him back in 6 to 8 weeks to review.

## 2020-04-20 NOTE — PROGRESS NOTES
Adrián Sheldon is a 65 y.o. male here for COPD and right chest wall pain with prior smoking history. Patient was referred by his primary care.    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  He saw his primary care doctor, was seen by telemedicine regarding his asthma and chest pain but did not have resolution of either symptoms or of his complaints, evaluation was by telemedicine and he came in personally today for full evaluation.    He underwent lung function testing at [facility] 3 months ago, we have requested those records.  We have also asked for the telemedicine record from Dr. Kasper.     He does have reactive airways, controlled presently with Breo and pro-air.  He is no longer using Spiriva.    Most importantly, he has a 30-pack-year smoking history, stopped smoking within the last 6 months, and is very concerned because he has right chest wall pain which is uncomfortable, positional, not explained by plain x-ray done recently and I have ordered a chest CT scan with contrast given the smoking history and the significant right chest discomfort.  It seems to be musculoskeletal but we will need to exclude a malignancy in the region that might be triggering this.    He is also undergoing evaluation for nasal blockage, ENT evaluation is upcoming.  We will look at the imaging, continue the inhalers, obtain the old lung function testing and then see him back in 6 to 8 weeks to review.      Constitutional ROS: No unexpected change in weight, No unexplained fevers  Eyes: No change in vision or blurring or double vision  Mouth/Throat ROS: No sore throat, No recent change in voice or hoarseness  Pulmonary ROS: See present history for pertinent positives  Cardiovascular ROS: No chest pain to suggest acute coronary syndrome  Gastrointestinal ROS: No abdominal pain to suggest peptic disease  Musculoskeletal/Extremities  "ROS: no acute artritis or unusual swelling  Hematologic/Lymphatic ROS: No easy bleeding or unusual lymph node swelling  Neurologic ROS: No new or unusual weakness  Psychiatric ROS: No hallucinations  Allergic/Immunologic: No  urticaria or allergic rash      Current Outpatient Medications   Medication Sig Dispense Refill   • Fluticasone Furoate-Vilanterol (BREO ELLIPTA) 200-25 MCG/INH AEROSOL POWDER, BREATH ACTIVATED Inhale 1 Puff by mouth every morning.     • albuterol 108 (90 Base) MCG/ACT Aero Soln inhalation aerosol Inhale 2 Puffs by mouth every 6 hours as needed for Shortness of Breath. 8.5 g 0   • tamsulosin (FLOMAX) 0.4 MG capsule Take 0.8 mg by mouth every evening.     • amoxicillin-clavulanate (AUGMENTIN) 875-125 MG Tab      • Tiotropium Bromide Monohydrate (SPIRIVA RESPIMAT) 1.25 MCG/ACT Aero Soln Inhale 2 Puffs by mouth every evening.       No current facility-administered medications for this visit.        Social History     Tobacco Use   • Smoking status: Former Smoker     Packs/day: 1.00     Years: 30.00     Pack years: 30.00     Types: Cigarettes     Start date: 1980     Last attempt to quit: 2019     Years since quittin.1   • Smokeless tobacco: Never Used   Substance Use Topics   • Alcohol use: Yes     Comment: moderately    • Drug use: No        Past Medical History:   Diagnosis Date   • Arthritis    • Asthma without status asthmaticus 3/7/2020   • Bronchitis    • Cataract    • COPD (chronic obstructive pulmonary disease) ([facility]) 3/7/2020   • [place] measles    • Hypertension    • Shortness of breath    • Wears glasses        History reviewed. No pertinent surgical history.    Allergies: Patient has no known allergies.    Family History   Family history unknown: Yes       Physical Examination    Vitals:    20 1528   Height: 1.676 m (5' 6\")   Weight: 68.9 kg (152 lb)   Weight % change since last entry.: 0 %   BP: 156/82   Pulse: 66   BMI (Calculated): 24.53   Resp: 14   Temp: 36.6 °C " (97.9 °F)   TempSrc: Oral       General Appearance: alert, no distress  Skin: Skin color, texture, turgor normal. No rashes or lesions.  Eyes: negative  Oropharynx: Lips, mucosa, and tongue normal. Teeth and gums normal. Oropharynx moist and without lesion  Lungs: positive findings: Forced expiratory wheeze  Heart: negative. RRR without murmur, gallop, or rubs.  No ectopy.  Abdomen: Abdomen soft, non-tender. . No masses,  No organomegaly  Extremities:  No deformities, edema, or skin discoloration  Joints: No acute arthritis  Peripheral Pulses:perfused  Neurologic: intact grossly  No clubbing    II (soft palate, uvula, fauces visible)    Imaging: Chest x-ray was clear, CT scan ordered    PFTS: Ordered and pending      Assessment and Plan  1. Intercostal pain, R side  Intermittent, musculoskeletal in nature, does not seem to be cardiac or exertional, but with smoking history evaluate as symptoms have been persistent and limit the patient's activity  - BUN; Future  - CBC WITH DIFFERENTIAL; Future  - CREATININE; Future  - CT-CHEST (THORAX) WITH; Future    2. Moderate persistent asthma without status asthmaticus without complication  Continue Breo and pro-air    3. Chronic obstructive pulmonary disease, unspecified COPD type (HCC)  Obtain prior lung function testing      Followup Return in about 8 weeks (around 6/15/2020) for follow up visit with Dr. Katie Hernadez.

## 2020-04-21 ENCOUNTER — HOSPITAL ENCOUNTER (OUTPATIENT)
Dept: LAB | Facility: MEDICAL CENTER | Age: 65
End: 2020-04-21
Attending: INTERNAL MEDICINE
Payer: COMMERCIAL

## 2020-04-21 DIAGNOSIS — R07.82 INTERCOSTAL PAIN: ICD-10-CM

## 2020-04-21 LAB
BASOPHILS # BLD AUTO: 0.8 % (ref 0–1.8)
BASOPHILS # BLD: 0.05 K/UL (ref 0–0.12)
BUN SERPL-MCNC: 13 MG/DL (ref 8–22)
CREAT SERPL-MCNC: 0.8 MG/DL (ref 0.5–1.4)
EOSINOPHIL # BLD AUTO: 0.23 K/UL (ref 0–0.51)
EOSINOPHIL NFR BLD: 3.5 % (ref 0–6.9)
ERYTHROCYTE [DISTWIDTH] IN BLOOD BY AUTOMATED COUNT: 45 FL (ref 35.9–50)
HCT VFR BLD AUTO: 47.5 % (ref 42–52)
HGB BLD-MCNC: 16.1 G/DL (ref 14–18)
IMM GRANULOCYTES # BLD AUTO: 0.08 K/UL (ref 0–0.11)
IMM GRANULOCYTES NFR BLD AUTO: 1.2 % (ref 0–0.9)
LYMPHOCYTES # BLD AUTO: 1.92 K/UL (ref 1–4.8)
LYMPHOCYTES NFR BLD: 29.6 % (ref 22–41)
MCH RBC QN AUTO: 31.6 PG (ref 27–33)
MCHC RBC AUTO-ENTMCNC: 33.9 G/DL (ref 33.7–35.3)
MCV RBC AUTO: 93.1 FL (ref 81.4–97.8)
MONOCYTES # BLD AUTO: 0.45 K/UL (ref 0–0.85)
MONOCYTES NFR BLD AUTO: 6.9 % (ref 0–13.4)
NEUTROPHILS # BLD AUTO: 3.75 K/UL (ref 1.82–7.42)
NEUTROPHILS NFR BLD: 58 % (ref 44–72)
NRBC # BLD AUTO: 0 K/UL
NRBC BLD-RTO: 0 /100 WBC
PLATELET # BLD AUTO: 199 K/UL (ref 164–446)
PMV BLD AUTO: 10.7 FL (ref 9–12.9)
RBC # BLD AUTO: 5.1 M/UL (ref 4.7–6.1)
WBC # BLD AUTO: 6.5 K/UL (ref 4.8–10.8)

## 2020-04-21 PROCEDURE — 84520 ASSAY OF UREA NITROGEN: CPT

## 2020-04-21 PROCEDURE — 85025 COMPLETE CBC W/AUTO DIFF WBC: CPT

## 2020-04-21 PROCEDURE — 82565 ASSAY OF CREATININE: CPT

## 2020-04-21 PROCEDURE — 36415 COLL VENOUS BLD VENIPUNCTURE: CPT

## 2020-05-11 ENCOUNTER — HOSPITAL ENCOUNTER (OUTPATIENT)
Dept: RADIOLOGY | Facility: MEDICAL CENTER | Age: 65
End: 2020-05-11
Attending: INTERNAL MEDICINE
Payer: COMMERCIAL

## 2020-05-11 DIAGNOSIS — R07.82 INTERCOSTAL PAIN: ICD-10-CM

## 2020-05-11 PROCEDURE — 700117 HCHG RX CONTRAST REV CODE 255: Performed by: INTERNAL MEDICINE

## 2020-05-11 PROCEDURE — 71260 CT THORAX DX C+: CPT

## 2020-05-11 RX ADMIN — IOHEXOL 75 ML: 350 INJECTION, SOLUTION INTRAVENOUS at 13:42

## 2020-06-04 ENCOUNTER — TELEPHONE (OUTPATIENT)
Dept: PULMONOLOGY | Facility: HOSPICE | Age: 65
End: 2020-06-04

## 2020-06-04 NOTE — TELEPHONE ENCOUNTER
VOICEMAIL: 06/04/2020  Caller: Adrián    Phone number: [phone] (home)     Message: Pt called and l/m. Wanting a call back to make an appt with Dr Hernadez.      Called and spoke with pt. Pt said he has an appt with Dr Hernadez but concerned about his results. Pt said he has chest pain that comes and goes but it's not new and Dr Hernadez knows about it.  Pt is worried  about having cancer.   Pt said he will see Dr Hernadez on 06/16/2020  Notified pt if anything changes, to call us to let us know. Pt understood.

## 2020-06-16 ENCOUNTER — OFFICE VISIT (OUTPATIENT)
Dept: PULMONOLOGY | Facility: HOSPICE | Age: 65
End: 2020-06-16
Payer: COMMERCIAL

## 2020-06-16 VITALS
TEMPERATURE: 97.9 F | BODY MASS INDEX: 24.91 KG/M2 | DIASTOLIC BLOOD PRESSURE: 70 MMHG | WEIGHT: 155 LBS | HEART RATE: 56 BPM | SYSTOLIC BLOOD PRESSURE: 122 MMHG | OXYGEN SATURATION: 98 % | HEIGHT: 66 IN | RESPIRATION RATE: 14 BRPM

## 2020-06-16 DIAGNOSIS — R07.82 INTERCOSTAL PAIN: ICD-10-CM

## 2020-06-16 DIAGNOSIS — J45.30 MILD PERSISTENT ASTHMA WITHOUT COMPLICATION: ICD-10-CM

## 2020-06-16 DIAGNOSIS — J44.9 CHRONIC OBSTRUCTIVE PULMONARY DISEASE, UNSPECIFIED COPD TYPE (HCC): ICD-10-CM

## 2020-06-16 PROCEDURE — 99214 OFFICE O/P EST MOD 30 MIN: CPT | Performed by: INTERNAL MEDICINE

## 2020-06-16 RX ORDER — AMLODIPINE BESYLATE 2.5 MG/1
TABLET ORAL
COMMUNITY
Start: 2020-05-21 | End: 2020-08-28

## 2020-06-16 ASSESSMENT — FIBROSIS 4 INDEX: FIB4 SCORE: 1.81

## 2020-06-16 ASSESSMENT — PAIN SCALES - GENERAL: PAINLEVEL: NO PAIN

## 2020-06-16 NOTE — PROGRESS NOTES
Adrián Sheldon is a 65 y.o. male here for reactive airways and results of recent chest CT imaging for right chest wall discomfort. Patient was referred by his primary care.    History of Present Illness:      Adrián comes in to follow-up on his reactive airways, presently on Breo and Spiriva and occasional use of rescue inhaler, lungs are clear.  He actually stopped the Spiriva without any deterioration.    He was having some discomfort in the right chest, underwent cardiac assessment including stress test that was unrevealing, and I did a chest CT scan, he has some granulomas of no consequence but no active process no chest wall process and nothing that would explain the right sided musculoskeletal chest pain.  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  He sometimes awakens with the minor right chest discomfort, I suspect this is costochondritis and I would suggest simple anti-inflammatories with observation.    Pulmonary status seems otherwise stable, he does live a distance and we can check him on an annual basis but sooner if problems occur.    Constitutional ROS: No unexpected change in weight, No unexplained fevers  Eyes: No change in vision or blurring or double vision  Mouth/Throat ROS: No sore throat, No recent change in voice or hoarseness  Pulmonary ROS: See present history for pertinent positives  Cardiovascular ROS: No chest pain to suggest acute coronary syndrome  Gastrointestinal ROS: No abdominal pain to suggest peptic disease  Musculoskeletal/Extremities ROS: no acute artritis or unusual swelling  Hematologic/Lymphatic ROS: No easy bleeding or unusual lymph node swelling  Neurologic ROS: No new or unusual weakness  Psychiatric ROS: No hallucinations  Allergic/Immunologic: No  urticaria or allergic rash      Current Outpatient Medications   Medication Sig Dispense Refill   • amLODIPine (NORVASC) 2.5 MG Tab      • Tiotropium Bromide Monohydrate  "(SPIRIVA RESPIMAT) 1.25 MCG/ACT Aero Soln Inhale 2 Puffs by mouth every evening.     • Fluticasone Furoate-Vilanterol (BREO ELLIPTA) 200-25 MCG/INH AEROSOL POWDER, BREATH ACTIVATED Inhale 1 Puff by mouth every morning.     • albuterol 108 (90 Base) MCG/ACT Aero Soln inhalation aerosol Inhale 2 Puffs by mouth every 6 hours as needed for Shortness of Breath. 8.5 g 0   • tamsulosin (FLOMAX) 0.4 MG capsule Take 0.8 mg by mouth every evening.     • amoxicillin-clavulanate (AUGMENTIN) 875-125 MG Tab        No current facility-administered medications for this visit.        Social History     Tobacco Use   • Smoking status: Former Smoker     Packs/day: 1.00     Years: 30.00     Pack years: 30.00     Types: Cigarettes     Start date: 1980     Last attempt to quit: 2019     Years since quittin.3   • Smokeless tobacco: Never Used   Substance Use Topics   • Alcohol use: Yes     Comment: moderately    • Drug use: No        Past Medical History:   Diagnosis Date   • Arthritis    • Asthma without status asthmaticus 3/7/2020   • Bronchitis    • Cataract    • Chest pain    • Chest tightness    • Constipation    • COPD (chronic obstructive pulmonary disease) ([facility]) 3/7/2020   • Frequent urination    • Citizen of [place] measles    • Hearing difficulty    • Heartburn    • Hypertension    • Painful joint    • Shortness of breath    • Sweat, sweating, excessive    • Vision loss    • Wears glasses        History reviewed. No pertinent surgical history.    Allergies: Patient has no known allergies.    Family History   Problem Relation Age of Onset   • Heart Disease Mother    • Heart Disease Father        Physical Examination    Vitals:    20 1513   Height: 1.676 m (5' 6\")   Weight: 70.3 kg (155 lb)   Weight % change since last entry.: 0 %   BP: 122/70   Pulse: (!) 56   BMI (Calculated): 25.02   Resp: 14   Temp: 36.6 °C (97.9 °F)   TempSrc: Tympanic       General Appearance: alert, no distress  Skin: Skin color, texture, turgor " normal. No rashes or lesions.  Eyes: negative  Oropharynx: Lips, mucosa, and tongue normal. Teeth and gums normal. Oropharynx moist and without lesion  Lungs: positive findings: Remarkably quiet and clear  Heart: negative. RRR without murmur, gallop, or rubs.  No ectopy.  Abdomen: Abdomen soft, non-tender. . No masses,  No organomegaly  Extremities:  No deformities, edema, or skin discoloration  Joints: No acute arthritis  Peripheral Pulses:perfused  Neurologic: intact grossly  R chest wall tenderness  Suggests costochondritis, very specific location at the right anterior junction of sternum and ribs, no local inflammation evident, patient will try Aleve or anti-inflammatories    I (soft palate, uvula, fauces, tonsillar pillars visible)    Imaging: Recent chest CT scan did not show abnormalities    PFTS: Prior reviewed      Assessment and Plan  1. Intercostal pain, R side  Costochondritis    2. Chronic obstructive pulmonary disease, unspecified COPD type (HCC)  No active bronchospasm presently    3. Mild persistent asthma without complication  On Breo and as needed rescue inhaler        Followup Return in about 1 year (around 6/16/2021) for follow up visit with Dr. Katie Hernadez.

## 2020-06-16 NOTE — PATIENT INSTRUCTIONS
Adrián comes in to follow-up on his reactive airways, presently on Breo and Spiriva and occasional use of rescue inhaler, lungs are clear.  He actually stopped the Spiriva without any deterioration.    He was having some discomfort in the right chest, underwent cardiac assessment including stress test that was unrevealing, and I did a chest CT scan, he has some granulomas of no consequence but no active process no chest wall process and nothing that would explain the right sided musculoskeletal chest pain.  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  He sometimes awakens with the minor right chest discomfort, I suspect this is costochondritis and I would suggest simple anti-inflammatories with observation.    Pulmonary status seems otherwise stable, he does live a distance and we can check him on an annual basis but sooner if problems occur.

## 2020-08-28 ENCOUNTER — OFFICE VISIT (OUTPATIENT)
Dept: VASCULAR LAB | Facility: MEDICAL CENTER | Age: 65
End: 2020-08-28
Attending: FAMILY MEDICINE
Payer: COMMERCIAL

## 2020-08-28 VITALS
DIASTOLIC BLOOD PRESSURE: 64 MMHG | WEIGHT: 155 LBS | HEART RATE: 53 BPM | HEIGHT: 66 IN | BODY MASS INDEX: 24.91 KG/M2 | SYSTOLIC BLOOD PRESSURE: 145 MMHG

## 2020-08-28 DIAGNOSIS — Z87.891 FORMER SMOKER: ICD-10-CM

## 2020-08-28 DIAGNOSIS — I10 ESSENTIAL HYPERTENSION: ICD-10-CM

## 2020-08-28 DIAGNOSIS — Z86.73 HISTORY OF TIA (TRANSIENT ISCHEMIC ATTACK): ICD-10-CM

## 2020-08-28 DIAGNOSIS — I65.21 CAROTID ARTERY STENOSIS, ASYMPTOMATIC, RIGHT: ICD-10-CM

## 2020-08-28 DIAGNOSIS — E78.49 OTHER HYPERLIPIDEMIA: ICD-10-CM

## 2020-08-28 DIAGNOSIS — J44.9 CHRONIC OBSTRUCTIVE PULMONARY DISEASE, UNSPECIFIED COPD TYPE (HCC): ICD-10-CM

## 2020-08-28 PROCEDURE — 99204 OFFICE O/P NEW MOD 45 MIN: CPT | Performed by: FAMILY MEDICINE

## 2020-08-28 PROCEDURE — 99212 OFFICE O/P EST SF 10 MIN: CPT

## 2020-08-28 RX ORDER — ROSUVASTATIN CALCIUM 10 MG/1
10 TABLET, COATED ORAL EVERY EVENING
Qty: 90 TAB | Refills: 3 | Status: SHIPPED | OUTPATIENT
Start: 2020-08-28 | End: 2021-09-01

## 2020-08-28 RX ORDER — AMLODIPINE BESYLATE AND BENAZEPRIL HYDROCHLORIDE 5; 10 MG/1; MG/1
1 CAPSULE ORAL DAILY
Qty: 90 CAP | Refills: 3 | Status: SHIPPED
Start: 2020-08-28 | End: 2021-06-30

## 2020-08-28 ASSESSMENT — ENCOUNTER SYMPTOMS
SHORTNESS OF BREATH: 0
FOCAL WEAKNESS: 0
WEAKNESS: 0
WHEEZING: 0
ABDOMINAL PAIN: 0
SEIZURES: 0
BRUISES/BLEEDS EASILY: 0
DEPRESSION: 0
NAUSEA: 0
PALPITATIONS: 0
DIZZINESS: 0
SORE THROAT: 0
COUGH: 0
HEMOPTYSIS: 0
INSOMNIA: 0
BLOOD IN STOOL: 0
FEVER: 0
BLURRED VISION: 0
ORTHOPNEA: 0
CHILLS: 0
DIARRHEA: 0
HEADACHES: 0
DOUBLE VISION: 0
VOMITING: 0
MYALGIAS: 0
NERVOUS/ANXIOUS: 0
TREMORS: 0

## 2020-08-28 ASSESSMENT — FIBROSIS 4 INDEX: FIB4 SCORE: 1.81

## 2020-08-28 NOTE — PROGRESS NOTES
INITIAL VASCULAR VISIT  Subjective:   Adrián Sheldon is a 65 y.o. y.o. male  who presents today 8/27/20 for   Chief Complaint   Patient presents with   • New Patient     carotid disease      initially referred by Beatrice Ireland P.Ac for eval and med mgmt of carotid artery disease     HPI:    Carotid artery disease:    No curent sx.  Reports prior chest area pain and had carotid eval as part of the w/u.   Had carotid duplex at [facility], noted to have mod stenosis of R ICA  No prior hx of CVA or carotid aa interventions     HTN:  Current HTN concerns: Denies   Current ADRs: No  HTN sx:  No current blurred or changed vision, chest pain, shortness of breath, headache, nausea, dizziness/vertigo   Home BP log: not checking   24h ABPM completed: not tested  Adherence to current HTN meds: compliant all of the time    Hyperlipidemia:    Reports no prior known HLD or statin therapy   Current treatment: none   Myalgias? Not applicable  Other adverse drug reactions? Not applicable  Last lipid profile: reviewed with patient, as noted below     Antiplatelet/anticoagulation:   Yes, Details: ASA 81mg daily , no bleeding noted     Type 2 DM:  No     CKD:    No     Sleeping disorder/MYLES:   No     Hypothyroidism:   No     Clinical evidence of ASCVD:    1) hx of MI or other ACS:  no  2) coronary or other revasc procedure: no  3) TIA/ischemic CVA: no  4) AS-related PAD (including RENETTA <0.9): no  5) other AS diseases (renal AS, AA due to AS, carotid plaque >50% stenosis): yes, carotid  6) evidence of AS from imaging (angiography, stress tests, CAC >300 or >74%ile for age/gender): no    Major ASCVD risk factors (3):    1) Age (Men>44, women>54):  yes   2) Fhx early CHD (MI, SCD, coronary revasc procedures in men <55, women <65):  no   3) cigarette smoking:   reports that he quit smoking about 18 months ago. His smoking use included cigarettes. He started smoking about 40 years ago. He has a 30.00 pack-year smoking history. He  has never used smokeless tobacco.   4) high BP >139/89 or on tx: yes   5) Low HDL-C (<40 men, <50 women): no   HDL   Date Value Ref Range Status   03/08/2020 46 >=40 mg/dL Final       Past Medical History:   Diagnosis Date   • Arthritis    • Asthma without status asthmaticus 3/7/2020   • Bronchitis    • Carotid artery stenosis, asymptomatic, right 8/28/2020   • Cataract    • Chest pain    • Chest tightness    • Constipation    • COPD (chronic obstructive pulmonary disease) ([facility]) 3/7/2020   • Frequent urination    • Cymraes measles    • Hearing difficulty    • Heartburn    • Hypertension    • Painful joint    • Shortness of breath    • Sweat, sweating, excessive    • Vision loss    • Wears glasses      History reviewed. No pertinent surgical history.   Current Outpatient Medications on File Prior to Visit   Medication Sig Dispense Refill   • aspirin EC (ECOTRIN) 81 MG Tablet Delayed Response Take 2 Tabs by mouth every day. 30 Tab    • Cholecalciferol (VITAMIN D-3) 125 MCG (5000 UT) Tab Take  by mouth. 30 Tab    • Tiotropium Bromide Monohydrate (SPIRIVA RESPIMAT) 1.25 MCG/ACT Aero Soln Inhale 2 Puffs by mouth every evening.     • Fluticasone Furoate-Vilanterol (BREO ELLIPTA) 200-25 MCG/INH AEROSOL POWDER, BREATH ACTIVATED Inhale 1 Puff by mouth every morning.     • tamsulosin (FLOMAX) 0.4 MG capsule Take 0.8 mg by mouth every evening.     • amoxicillin-clavulanate (AUGMENTIN) 875-125 MG Tab      • albuterol 108 (90 Base) MCG/ACT Aero Soln inhalation aerosol Inhale 2 Puffs by mouth every 6 hours as needed for Shortness of Breath. (Patient not taking: Reported on 8/28/2020) 8.5 g 0     No current facility-administered medications on file prior to visit.      No Known Allergies    Family History   Problem Relation Age of Onset   • Heart Disease Mother    • Heart Disease Father         Social History     Tobacco Use   • Smoking status: Former Smoker     Packs/day: 1.00     Years: 30.00     Pack years: 30.00     Types:  "Cigarettes     Start date: 1980     Quit date: 2019     Years since quittin.5   • Smokeless tobacco: Never Used   Substance Use Topics   • Alcohol use: Yes     Comment: moderately    • Drug use: No     DIET AND EXERCISE:  Weight Change:stable   BMI Readings from Last 5 Encounters:   20 25.02 kg/m²   20 25.02 kg/m²   20 24.53 kg/m²   20 23.11 kg/m²   20 23.00 kg/m²      Diet: common adult  Exercise: no regular exercise program   Review of Systems   Constitutional: Negative for chills, fever and malaise/fatigue.   HENT: Negative for nosebleeds, sore throat and tinnitus.    Eyes: Negative for blurred vision and double vision.   Respiratory: Negative for cough, hemoptysis, shortness of breath and wheezing.    Cardiovascular: Negative for chest pain, palpitations, orthopnea and leg swelling.   Gastrointestinal: Negative for abdominal pain, blood in stool, diarrhea, melena, nausea and vomiting.   Genitourinary: Negative for hematuria.   Musculoskeletal: Negative for joint pain and myalgias.   Skin: Negative for itching and rash.   Neurological: Negative for dizziness, tremors, focal weakness, seizures, weakness and headaches.   Endo/Heme/Allergies: Does not bruise/bleed easily.   Psychiatric/Behavioral: Negative for depression. The patient is not nervous/anxious and does not have insomnia.       Objective:     Vitals:    20 1533 20 1540   BP: 146/64 145/64   BP Location: Left arm Left arm   Patient Position: Sitting Sitting   BP Cuff Size: Adult Adult   Pulse: (!) 53 (!) 53   Weight: 70.3 kg (155 lb)    Height: 1.676 m (5' 6\")       BP Readings from Last 5 Encounters:   20 145/64   20 122/70   20 156/82   20 160/74   20 159/71      Body mass index is 25.02 kg/m².  Physical Exam  Vitals signs reviewed.   Constitutional:       Appearance: Normal appearance.   HENT:      Head: Normocephalic and atraumatic.      Nose: Nose normal.      " Mouth/Throat:      Mouth: Mucous membranes are moist.      Pharynx: Oropharynx is clear.   Eyes:      Extraocular Movements: Extraocular movements intact.      Conjunctiva/sclera: Conjunctivae normal.   Neck:      Musculoskeletal: Normal range of motion and neck supple.   Cardiovascular:      Rate and Rhythm: Normal rate and regular rhythm.      Pulses: Normal pulses.           Carotid pulses are 2+ on the right side and 2+ on the left side.       Radial pulses are 2+ on the right side and 2+ on the left side.        Dorsalis pedis pulses are 2+ on the right side and 2+ on the left side.        Posterior tibial pulses are 2+ on the right side and 2+ on the left side.      Heart sounds: Normal heart sounds.      Comments:    Spider telangectasia:       RLE:  None      LLE: none   Varicosities:           RLE: none      LLE: none   Corona phlebectatica:      RLE:  None        LLE:  None   Cording:         RLE:  None     LLE: None     Pulmonary:      Effort: Pulmonary effort is normal.      Breath sounds: Normal breath sounds.   Abdominal:      General: Abdomen is flat. Bowel sounds are normal.      Palpations: Abdomen is soft.   Musculoskeletal:      Right lower leg: No edema.      Left lower leg: No edema.   Skin:     General: Skin is warm and dry.      Capillary Refill: Capillary refill takes less than 2 seconds.   Neurological:      General: No focal deficit present.      Mental Status: He is alert and oriented to person, place, and time. Mental status is at baseline.   Psychiatric:         Mood and Affect: Mood normal.         Behavior: Behavior normal.         Lab Results   Component Value Date    CHOLSTRLTOT 167 03/08/2020     (H) 03/08/2020    HDL 46 03/08/2020    TRIGLYCERIDE 87 03/08/2020      Lab Results   Component Value Date    PROTHROMBTM 16.3 (H) 11/22/2018    INR 1.30 (H) 11/22/2018       Lab Results   Component Value Date    HBA1C 5.8 (H) 11/23/2018      Lab Results   Component Value Date     SODIUM 136 2020    POTASSIUM 4.4 2020    CHLORIDE 107 2020    CO2 22 2020    GLUCOSE 112 (H) 2020    BUN 13 2020    CREATININE 0.80 2020    IFAFRICA >60 2020    IFNOTAFR >60 2020        Lab Results   Component Value Date    WBC 6.5 2020    RBC 5.10 2020    HEMOGLOBIN 16.1 2020    HEMATOCRIT 47.5 2020    MCV 93.1 2020    MCH 31.6 2020    MCHC 33.9 2020    MPV 10.7 2020       VASCULAR IMAGING:   Last EKG:   Results for orders placed or performed during the hospital encounter of 20   EKG   Result Value Ref Range    Report       [facility] Emergency Dept.    Test Date:  2020  Pt Name:    [facility]              Department: ER  MRN:        [id number]                      Room:        26  Gender:     Male                         Technician: [id number]  :        1955                   Requested By:EMRE LOPEZ  Order #:    [id number]                    Reading MD: EMRE LOPEZ MD    Measurements  Intervals                                Axis  Rate:       60                           P:          -3  IA:         152                          QRS:        49  QRSD:       94                           T:          -8  QT:         412  QTc:        412    Interpretive Statements  SINUS RHYTHM  BORDERLINE T ABNORMALITIES, INFERIOR LEADS  Compared to ECG 2018 20:47:23  T-wave abnormality now present  Sinus bradycardia no longer present  Electronically Signed On 3-7-2020 14:10:37 PST by EMRE LOPEZ MD     EKG in four (4) hours   Result Value Ref Range    Report       [facility]    Test Date:  2020  Pt Name:    [facility]              Department: CPU  MRN:        [id number]                      Room:       06  Gender:     Male                         Technician: EAB  :        1955                   Requested By:ALEKSANDRA MARTINEZ  Order #:    [id number]                     Reading MD: Sonia Loza MD    Measurements  Intervals                                Axis  Rate:       64                           P:          42  RI:         152                          QRS:        58  QRSD:       96                           T:          5  QT:         388  QTc:        401    Interpretive Statements  SINUS RHYTHM  NON-SPECIFIC T-WAVE CHANGES  Compared to ECG 03/07/2020 13:45:25  No significant change noted  Electronically Signed On 3-7-2020 23:29:08 PST by Sonia Loza MD       CTA neck 11/2018  Unremarkable CT angiogram of the neck. No high-grade stenosis, large vessel occlusion, aneurysm or dissection.    CTA head 11/2018   1.  No large vessel occlusion, high-grade stenosis or aneurysm of the [place].  2.  No CT evidence of acute infarct, hemorrhage or mass.  1.  Cerebral blood flow less than 30% likely representing completed infarct = 0 mL.   2.  T Max more than 6 seconds likely representing combination of completed infarct and ischemia = 0 mL.   3.  Mismatched volume likely representing ischemic brain/penumbra = 0 mL.   4.  Please note that the cerebral perfusion was performed on the limited brain tissue around the basal ganglia region. Infarct/ischemia outside the CT perfusion sections can be missed in this study.    MRI brain 2018   1.  No acute abnormality.  2.  Mild cerebral atrophy.  3.  Minimal chronic microvascular ischemic disease.    MPI 3/2020   NUCLEAR IMAGING INTERPRETATION   No evidence of significant jeopardized viable myocardium or prior myocardial    infarction.   Normal left ventricular size, ejection fraction, and wall motion.    Echo 3/2020  Normal left ventricular systolic function. Left ventricular ejection   fraction is visually estimated to be 60%.   Normal diastolic function.  Normal inferior vena cava size without inspiratory collapse.    CT chest 5/2020  1.  There is no lung mass or destructive bony process to explain the patient's right  chest wall pain.  2.  There is a nonspecific 4 mm right upper lobe lung nodule, probably an area of scarring.  3.  There is a calcified granuloma in the left lower lobe with small calcified mediastinal nodes consistent with prior granulomatous inflammatory process.  4.  There is aortic atherosclerosis.    Carotid duplex 7/15/2020 ([facility])  Moderate 40-69% R ICA ,  No stenosis L ICA   bilat moderate atherosclerotic changes     Medical Decision Making:  Today's Assessment / Status / Plan:     1. Carotid artery stenosis, asymptomatic, right  amlodipine-benazepril (LOTREL) 5-10 MG per capsule    rosuvastatin (CRESTOR) 10 MG Tab    CBC WITHOUT DIFFERENTIAL    Comp Metabolic Panel    Lipid Profile    URINALYSIS    MICROALBUMIN CREAT RATIO URINE   2. Essential hypertension  amlodipine-benazepril (LOTREL) 5-10 MG per capsule    CBC WITHOUT DIFFERENTIAL    Comp Metabolic Panel    Lipid Profile    URINALYSIS    MICROALBUMIN CREAT RATIO URINE   3. Former smoker     4. Chronic obstructive pulmonary disease, unspecified COPD type (HCC)     5. Other hyperlipidemia  amlodipine-benazepril (LOTREL) 5-10 MG per capsule    rosuvastatin (CRESTOR) 10 MG Tab    CBC WITHOUT DIFFERENTIAL    Comp Metabolic Panel    Lipid Profile    URINALYSIS    MICROALBUMIN CREAT RATIO URINE   6. History of TIA (transient ischemic attack)         Patient Type: Primary Prevention    Etiology of Established CVD if Present:   1) R ICA stenosis, moderate   2) aortic athero  3) TIA, 11/2018    Antithrombotic therapy:  Indication: carotid stenosis   Anti-Platelet/Anti-Coagulant Tx: yes  - continue ASA 81mg daily     Lipid Management: Qualifies for Statin Therapy Based on 2018 ACC/AHA Guidelines: yes  Calculated 10-Year Risk of ASCVD (if LDL <189, no CKD G3b/4/5): N/A  Currently on Statin: Started at this visit  NLA/ACC/AHA risk category:   High:  Carotid disease, 3+ major risk factors    Tx threshold: non-HDL-C >129, LDL-C >99  Tx goal:  non-HDL <130, LDL-C  <100 (optional LDLc <70)  At goal:  no  Plan:  - reinforced ongoing TLC measures   - monitor labs   - start rosuvastatin 10mg daily with vit D3 5,000 units daily     Blood Pressure Management:Goal: ACC/AHA (2017) goal <130/80  Home BP at goal:  yes  Office BP at goal:  no  Inidications of end organ damage:   Echo/EKG: N/A    UACR: pending    Renal parenchymal disease:  normal   Ophthalmo: N/A    Device candidate? no  Plan:   Monitoring:   - start/continue home BP monitoring, reviewed correct technique, provide BP log and instructions  - order 24h ABPM:  NO  - monitor lytes/gfr routinely   - contact office if BP consistently >140/>90 to discussion of tx adjustments   Medications:  ACEi/ARB: add benazepril 10mg QHS (combo)  DHP-CCB: increase amlodipine to 5mg QHS (combo)  Thiazide: none   Valerio-receptor Antagonist: not indicated at this time     Glycemic Status: Normal    Smoking: maintenance stage    reports that he quit smoking about 18 months ago. His smoking use included cigarettes. He started smoking about 40 years ago. He has a 30.00 pack-year smoking history. He has never used smokeless tobacco.   Provided strong recommendation for complete cessation and informed this is the primary contributor to the majority of all ASCVD and cancer-related conditions and can result in significant morbidity and early mortality.   - reviewed resources for cessation including tobacco cessation clinic visit, pharmacotx meds, quit lines  - review at every visit      Physical Activity: continue healthy activity to improve CV fitness, see care instructions for additional details     Weight Management and Nutrition: Dietary plan was discussed with patient at this visit including DASH, low sodium and/or as outlined in care instructions     Other:   1) R ICA stenosis, moderate, no symptoms.   No indications or surgical intervention at this time   - continue med mgmt   - update duplex in 1 yr (July), consider CTA if worsening stenosis      2) TIA, episode of ataxia   Normal MRI, CTA head/neck   - continue med mgmt     Instructed to follow-up with PCP for remainder of adult medical needs: yes  We will partner with other providers in the management of established vascular disease and cardiometabolic risk factors.    Studies to Be Obtained: carotid duplex 7/2021  - not ordered, aprn to track   Labs to Be Obtained: as noted above     Follow up in: 2 months with aprn     Douglas Parikh M.D.  [facility]   [facility]

## 2020-10-21 ENCOUNTER — TELEPHONE (OUTPATIENT)
Dept: VASCULAR LAB | Facility: MEDICAL CENTER | Age: 65
End: 2020-10-21

## 2020-10-21 NOTE — TELEPHONE ENCOUNTER
Pt was a no show for her/his vascular visit today. Tasked for MA to call and reschedule. RADAMES Huizar.

## 2020-10-22 ENCOUNTER — TELEPHONE (OUTPATIENT)
Dept: VASCULAR LAB | Facility: MEDICAL CENTER | Age: 65
End: 2020-10-22

## 2020-11-02 DIAGNOSIS — J44.9 CHRONIC OBSTRUCTIVE PULMONARY DISEASE, UNSPECIFIED COPD TYPE (HCC): ICD-10-CM

## 2020-11-02 RX ORDER — ALBUTEROL SULFATE 90 UG/1
2 AEROSOL, METERED RESPIRATORY (INHALATION) EVERY 6 HOURS PRN
Qty: 1 EACH | Refills: 2 | Status: SHIPPED | OUTPATIENT
Start: 2020-11-02 | End: 2023-05-12 | Stop reason: SDUPTHER

## 2020-11-02 NOTE — TELEPHONE ENCOUNTER
Have we ever prescribed this med? No.  If yes, what date? 10/22/2019    Last OV: 6/16/2020 [facility]    Next OV: 6/16/2021 [facility]    DX: COPD    Medications: Proair

## 2020-11-03 ASSESSMENT — ENCOUNTER SYMPTOMS
NERVOUS/ANXIOUS: 0
ABDOMINAL PAIN: 0
ORTHOPNEA: 0
BLURRED VISION: 0
SORE THROAT: 0
VOMITING: 0
COUGH: 0
MYALGIAS: 0
FOCAL WEAKNESS: 0
SEIZURES: 0
NAUSEA: 0
DOUBLE VISION: 0
FEVER: 0
BLOOD IN STOOL: 0
DIZZINESS: 0
WEAKNESS: 0
HEMOPTYSIS: 0
HEADACHES: 0
DIARRHEA: 0
TREMORS: 0
SHORTNESS OF BREATH: 0
BRUISES/BLEEDS EASILY: 0
PALPITATIONS: 0
INSOMNIA: 0
WHEEZING: 0
DEPRESSION: 0
CHILLS: 0

## 2020-11-04 ENCOUNTER — APPOINTMENT (OUTPATIENT)
Dept: VASCULAR LAB | Facility: MEDICAL CENTER | Age: 65
End: 2020-11-04
Payer: COMMERCIAL

## 2020-11-04 NOTE — PROGRESS NOTES
INITIAL VASCULAR VISIT  Subjective:   Adrián Sheldon is a 65 y.o. y.o. male  who presents today 11/4/20 for   No chief complaint on file.    initially referred by Beatrice Ireland P.Ac for eval and med mgmt of carotid artery disease     HPI:    Carotid artery disease:    No curent sx.  Reports prior chest area pain and had carotid eval as part of the w/u.   Had carotid duplex at [facility], noted to have mod stenosis of R ICA  No prior hx of CVA or carotid aa interventions     HTN:  Current HTN concerns: Denies   Current ADRs: No  HTN sx:  No current blurred or changed vision, chest pain, shortness of breath, headache, nausea, dizziness/vertigo   Home BP log: not checking ***  24h ABPM completed: not tested  Adherence to current HTN meds: compliant all of the time    Hyperlipidemia:    Reports no prior known HLD or statin therapy   Current treatment: none   Myalgias? Not applicable  Other adverse drug reactions? Not applicable  Last lipid profile: reviewed with patient, as noted below     Antiplatelet/anticoagulation:   Yes, Details: ASA 81mg daily , no bleeding noted     Type 2 DM:  No     CKD:    No     Sleeping disorder/MYLES:   No     Hypothyroidism:   No     Clinical evidence of ASCVD:    1) hx of MI or other ACS:  no  2) coronary or other revasc procedure: no  3) TIA/ischemic CVA: no  4) AS-related PAD (including RENETTA <0.9): no  5) other AS diseases (renal AS, AA due to AS, carotid plaque >50% stenosis): yes, carotid  6) evidence of AS from imaging (angiography, stress tests, CAC >300 or >74%ile for age/gender): no    Major ASCVD risk factors (3):    1) Age (Men>44, women>54):  yes   2) Fhx early CHD (MI, SCD, coronary revasc procedures in men <55, women <65):  no   3) cigarette smoking:   reports that he quit smoking about 20 months ago. His smoking use included cigarettes. He started smoking about 40 years ago. He has a 30.00 pack-year smoking history. He has never used smokeless tobacco.   4) high BP  >139/89 or on tx: yes   5) Low HDL-C (<40 men, <50 women): no   HDL   Date Value Ref Range Status   03/08/2020 46 >=40 mg/dL Final       Past Medical History:   Diagnosis Date   • Arthritis    • Asthma without status asthmaticus 3/7/2020   • Bronchitis    • Carotid artery stenosis, asymptomatic, right 8/28/2020   • Cataract    • Chest pain    • Chest tightness    • Constipation    • COPD (chronic obstructive pulmonary disease) ([facility]) 3/7/2020   • Frequent urination    • Malaysian measles    • Hearing difficulty    • Heartburn    • Hypertension    • Painful joint    • Shortness of breath    • Sweat, sweating, excessive    • Vision loss    • Wears glasses      No past surgical history on file.   Current Outpatient Medications on File Prior to Visit   Medication Sig Dispense Refill   • albuterol 108 (90 Base) MCG/ACT Aero Soln inhalation aerosol Inhale 2 Puffs by mouth every 6 hours as needed for Shortness of Breath. 1 Each 2   • aspirin EC (ECOTRIN) 81 MG Tablet Delayed Response Take 2 Tabs by mouth every day. 30 Tab    • amlodipine-benazepril (LOTREL) 5-10 MG per capsule Take 1 Cap by mouth every day for 360 days. 90 Cap 3   • rosuvastatin (CRESTOR) 10 MG Tab Take 1 Tab by mouth every evening for 360 days. 90 Tab 3   • Cholecalciferol (VITAMIN D-3) 125 MCG (5000 UT) Tab Take  by mouth. 30 Tab    • amoxicillin-clavulanate (AUGMENTIN) 875-125 MG Tab      • Tiotropium Bromide Monohydrate (SPIRIVA RESPIMAT) 1.25 MCG/ACT Aero Soln Inhale 2 Puffs by mouth every evening.     • Fluticasone Furoate-Vilanterol (BREO ELLIPTA) 200-25 MCG/INH AEROSOL POWDER, BREATH ACTIVATED Inhale 1 Puff by mouth every morning.     • tamsulosin (FLOMAX) 0.4 MG capsule Take 0.8 mg by mouth every evening.       No current facility-administered medications on file prior to visit.      No Known Allergies    Family History   Problem Relation Age of Onset   • Heart Disease Mother    • Heart Disease Father         Social History     Tobacco Use   • Smoking  status: Former Smoker     Packs/day: 1.00     Years: 30.00     Pack years: 30.00     Types: Cigarettes     Start date: 1980     Quit date: 2019     Years since quittin.6   • Smokeless tobacco: Never Used   Substance Use Topics   • Alcohol use: Yes     Comment: moderately    • Drug use: No     DIET AND EXERCISE:  Weight Change:stable   BMI Readings from Last 5 Encounters:   20 25.02 kg/m²   20 25.02 kg/m²   20 24.53 kg/m²   20 23.11 kg/m²   20 23.00 kg/m²      Diet: common adult  Exercise: no regular exercise program   Review of Systems   Constitutional: Negative for chills, fever and malaise/fatigue.   HENT: Negative for nosebleeds, sore throat and tinnitus.    Eyes: Negative for blurred vision and double vision.   Respiratory: Negative for cough, hemoptysis, shortness of breath and wheezing.    Cardiovascular: Negative for chest pain, palpitations, orthopnea and leg swelling.   Gastrointestinal: Negative for abdominal pain, blood in stool, diarrhea, melena, nausea and vomiting.   Genitourinary: Negative for hematuria.   Musculoskeletal: Negative for joint pain and myalgias.   Skin: Negative for itching and rash.   Neurological: Negative for dizziness, tremors, focal weakness, seizures, weakness and headaches.   Endo/Heme/Allergies: Does not bruise/bleed easily.   Psychiatric/Behavioral: Negative for depression. The patient is not nervous/anxious and does not have insomnia.       Objective:     There were no vitals filed for this visit.   BP Readings from Last 5 Encounters:   20 145/64   20 122/70   20 156/82   20 160/74   20 159/71      There is no height or weight on file to calculate BMI.  Physical Exam  Vitals signs reviewed.   Constitutional:       Appearance: Normal appearance.   HENT:      Head: Normocephalic and atraumatic.      Nose: Nose normal.      Mouth/Throat:      Mouth: Mucous membranes are moist.      Pharynx: Oropharynx is  clear.   Eyes:      Extraocular Movements: Extraocular movements intact.      Conjunctiva/sclera: Conjunctivae normal.   Neck:      Musculoskeletal: Normal range of motion and neck supple.   Cardiovascular:      Rate and Rhythm: Normal rate and regular rhythm.      Pulses: Normal pulses.           Carotid pulses are 2+ on the right side and 2+ on the left side.       Radial pulses are 2+ on the right side and 2+ on the left side.        Dorsalis pedis pulses are 2+ on the right side and 2+ on the left side.        Posterior tibial pulses are 2+ on the right side and 2+ on the left side.      Heart sounds: Normal heart sounds.      Comments:    Spider telangectasia:       RLE:  None      LLE: none   Varicosities:           RLE: none      LLE: none   Corona phlebectatica:      RLE:  None        LLE:  None   Cording:         RLE:  None     LLE: None     Pulmonary:      Effort: Pulmonary effort is normal.      Breath sounds: Normal breath sounds.   Abdominal:      General: Abdomen is flat. Bowel sounds are normal.      Palpations: Abdomen is soft.   Musculoskeletal:      Right lower leg: No edema.      Left lower leg: No edema.   Skin:     General: Skin is warm and dry.      Capillary Refill: Capillary refill takes less than 2 seconds.   Neurological:      General: No focal deficit present.      Mental Status: He is alert and oriented to person, place, and time. Mental status is at baseline.   Psychiatric:         Mood and Affect: Mood normal.         Behavior: Behavior normal.         Lab Results   Component Value Date    CHOLSTRLTOT 167 03/08/2020     (H) 03/08/2020    HDL 46 03/08/2020    TRIGLYCERIDE 87 03/08/2020      Lab Results   Component Value Date    PROTHROMBTM 16.3 (H) 11/22/2018    INR 1.30 (H) 11/22/2018       Lab Results   Component Value Date    HBA1C 5.8 (H) 11/23/2018      Lab Results   Component Value Date    SODIUM 136 03/08/2020    POTASSIUM 4.4 03/08/2020    CHLORIDE 107 03/08/2020    CO2 22  2020    GLUCOSE 112 (H) 2020    BUN 13 2020    CREATININE 0.80 2020    IFAFRICA >60 2020    IFNOTAFR >60 2020        Lab Results   Component Value Date    WBC 6.5 2020    RBC 5.10 2020    HEMOGLOBIN 16.1 2020    HEMATOCRIT 47.5 2020    MCV 93.1 2020    MCH 31.6 2020    MCHC 33.9 2020    MPV 10.7 2020       VASCULAR IMAGING:   Last EKG:   Results for orders placed or performed during the hospital encounter of 20   EKG   Result Value Ref Range    Report       [facility] Emergency Dept[facility]    Test Date:  2020  Pt Name:    REJI LEVINETERO              Department: ER  MRN:        [id number]                      Room:        26  Gender:     Male                         Technician: [id number]  :        1955                   Requested By:EMRE LOPEZ  Order #:    [id number]                    Reading MD: EMRE LOPEZ MD    Measurements  Intervals                                Axis  Rate:       60                           P:          -3  OK:         152                          QRS:        49  QRSD:       94                           T:          -8  QT:         412  QTc:        412    Interpretive Statements  SINUS RHYTHM  BORDERLINE T ABNORMALITIES, INFERIOR LEADS  Compared to ECG 2018 20:47:23  T-wave abnormality now present  Sinus bradycardia no longer present  Electronically Signed On 3-7-2020 14:10:37 PST by EMRE LOPEZ MD     EKG in four (4) hours   Result Value Ref Range    Report       [facility]    Test Date:  2020  Pt Name:    REJI MAYA              Department: CPU  MRN:        [id number]                      Room:       T206  Gender:     Male                         Technician: EAB  :        1955                   Requested By:ALEKSANDRA MARTINEZ  Order #:    [id number]                    Reading MD: Sonia Loza MD    Measurements  Intervals                                 Axis  Rate:       64                           P:          42  GA:         152                          QRS:        58  QRSD:       96                           T:          5  QT:         388  QTc:        401    Interpretive Statements  SINUS RHYTHM  NON-SPECIFIC T-WAVE CHANGES  Compared to ECG 03/07/2020 13:45:25  No significant change noted  Electronically Signed On 3-7-2020 23:29:08 PST by Sonia Loza MD       CTA neck 11/2018  Unremarkable CT angiogram of the neck. No high-grade stenosis, large vessel occlusion, aneurysm or dissection.    CTA head 11/2018   1.  No large vessel occlusion, high-grade stenosis or aneurysm of the [place] of [place].  2.  No CT evidence of acute infarct, hemorrhage or mass.  1.  Cerebral blood flow less than 30% likely representing completed infarct = 0 mL.   2.  T Max more than 6 seconds likely representing combination of completed infarct and ischemia = 0 mL.   3.  Mismatched volume likely representing ischemic brain/penumbra = 0 mL.   4.  Please note that the cerebral perfusion was performed on the limited brain tissue around the basal ganglia region. Infarct/ischemia outside the CT perfusion sections can be missed in this study.    MRI brain 2018   1.  No acute abnormality.  2.  Mild cerebral atrophy.  3.  Minimal chronic microvascular ischemic disease.    MPI 3/2020   NUCLEAR IMAGING INTERPRETATION   No evidence of significant jeopardized viable myocardium or prior myocardial    infarction.   Normal left ventricular size, ejection fraction, and wall motion.    Echo 3/2020  Normal left ventricular systolic function. Left ventricular ejection   fraction is visually estimated to be 60%.   Normal diastolic function.  Normal inferior vena cava size without inspiratory collapse.    CT chest 5/2020  1.  There is no lung mass or destructive bony process to explain the patient's right chest wall pain.  2.  There is a nonspecific 4 mm right upper lobe lung nodule, probably  an area of scarring.  3.  There is a calcified granuloma in the left lower lobe with small calcified mediastinal nodes consistent with prior granulomatous inflammatory process.  4.  There is aortic atherosclerosis.    Carotid duplex 7/15/2020 ([facility])  Moderate 40-69% R ICA ,  No stenosis L ICA   bilat moderate atherosclerotic changes     Medical Decision Making:  Today's Assessment / Status / Plan:     1. Carotid artery stenosis, asymptomatic, right     2. History of TIA (transient ischemic attack)     3. Essential hypertension     4. Other hyperlipidemia         Patient Type: Primary Prevention    Etiology of Established CVD if Present:   1) R ICA stenosis, moderate   2) aortic athero  3) TIA, 11/2018    Antithrombotic therapy:  Indication: carotid stenosis   Anti-Platelet/Anti-Coagulant Tx: yes  - continue ASA 81mg daily     Lipid Management: Qualifies for Statin Therapy Based on 2018 ACC/AHA Guidelines: yes  Calculated 10-Year Risk of ASCVD (if LDL <189, no CKD G3b/4/5): N/A  Currently on Statin: Started at this visit  NLA/ACC/AHA risk category:   High:  Carotid disease, 3+ major risk factors    Tx threshold: non-HDL-C >129, LDL-C >99  Tx goal:  non-HDL <130, LDL-C <100 (optional LDLc <70)  At goal:  no  Plan:  - reinforced ongoing TLC measures   - monitor labs   - start rosuvastatin 10mg daily with vit D3 5,000 units daily     Blood Pressure Management:Goal: ACC/AHA (2017) goal <130/80  Home BP at goal:  yes  Office BP at goal:  no  Inidications of end organ damage:   Echo/EKG: N/A    UACR: pending    Renal parenchymal disease:  normal   Ophthalmo: N/A    Device candidate? no  Plan:   Monitoring:   - start/continue home BP monitoring, reviewed correct technique, provide BP log and instructions  - order 24h ABPM:  NO  - monitor lytes/gfr routinely   - contact office if BP consistently >140/>90 to discussion of tx adjustments   Medications:  ACEi/ARB: add benazepril 10mg QHS (combo)  DHP-CCB: increase  amlodipine to 5mg QHS (combo)  Thiazide: none   Valerio-receptor Antagonist: not indicated at this time     Glycemic Status: Normal    Smoking: maintenance stage    reports that he quit smoking about 20 months ago. His smoking use included cigarettes. He started smoking about 40 years ago. He has a 30.00 pack-year smoking history. He has never used smokeless tobacco.   Provided strong recommendation for complete cessation and informed this is the primary contributor to the majority of all ASCVD and cancer-related conditions and can result in significant morbidity and early mortality.   - reviewed resources for cessation including tobacco cessation clinic visit, pharmacotx meds, quit lines  - review at every visit      Physical Activity: continue healthy activity to improve CV fitness, see care instructions for additional details     Weight Management and Nutrition: Dietary plan was discussed with patient at this visit including DASH, low sodium and/or as outlined in care instructions     Other:   1) R ICA stenosis, moderate, no symptoms.   No indications or surgical intervention at this time   - continue med mgmt   - update duplex in 1 yr (July), consider CTA if worsening stenosis     2) TIA, episode of ataxia   Normal MRI, CTA head/neck   - continue med mgmt     Instructed to follow-up with PCP for remainder of adult medical needs: yes  We will partner with other providers in the management of established vascular disease and cardiometabolic risk factors.    Studies to Be Obtained: carotid duplex 7/2021  - not ordered, aprn to track   Labs to Be Obtained: as noted above     Follow up in: 2 months with aprn     RADAMES Huizar.  [facility]   [facility]

## 2021-02-12 DIAGNOSIS — J44.9 CHRONIC OBSTRUCTIVE PULMONARY DISEASE, UNSPECIFIED COPD TYPE (HCC): ICD-10-CM

## 2021-02-12 NOTE — TELEPHONE ENCOUNTER
Have we ever prescribed this med? Yes.  If yes, what date? 03/07/2020    Last OV: 06/01/2020-Satya     Next OV: 06/16/2021-Satya    DX: COPD    Medications:   Requested Prescriptions     Pending Prescriptions Disp Refills   • Fluticasone Furoate-Vilanterol (BREO ELLIPTA) 200-25 MCG/INH AEROSOL POWDER, BREATH ACTIVATED 1 Each 5     Sig: Inhale 1 Puff every day.

## 2021-05-18 ENCOUNTER — DOCUMENTATION (OUTPATIENT)
Dept: VASCULAR LAB | Facility: MEDICAL CENTER | Age: 66
End: 2021-05-18

## 2021-05-18 DIAGNOSIS — I65.21 CAROTID ARTERY STENOSIS, ASYMPTOMATIC, RIGHT: ICD-10-CM

## 2021-05-18 NOTE — PROGRESS NOTES
Left pt a VM to have him call us back so we could get them scheduled for their overdue vascular follow up appt.

## 2021-05-27 ENCOUNTER — HOSPITAL ENCOUNTER (OUTPATIENT)
Dept: RADIOLOGY | Facility: MEDICAL CENTER | Age: 66
End: 2021-05-27
Attending: NURSE PRACTITIONER
Payer: COMMERCIAL

## 2021-05-27 DIAGNOSIS — R10.13 ABDOMINAL PAIN, EPIGASTRIC: ICD-10-CM

## 2021-05-27 DIAGNOSIS — R14.0 BLOATING: ICD-10-CM

## 2021-05-27 DIAGNOSIS — R10.9 ABDOMINAL PAIN, UNSPECIFIED ABDOMINAL LOCATION: ICD-10-CM

## 2021-05-27 PROCEDURE — 76705 ECHO EXAM OF ABDOMEN: CPT

## 2021-06-30 ENCOUNTER — HOSPITAL ENCOUNTER (OUTPATIENT)
Facility: MEDICAL CENTER | Age: 66
End: 2021-06-30
Attending: NURSE PRACTITIONER
Payer: COMMERCIAL

## 2021-06-30 ENCOUNTER — OFFICE VISIT (OUTPATIENT)
Dept: URGENT CARE | Facility: PHYSICIAN GROUP | Age: 66
End: 2021-06-30
Payer: COMMERCIAL

## 2021-06-30 ENCOUNTER — APPOINTMENT (OUTPATIENT)
Dept: RADIOLOGY | Facility: IMAGING CENTER | Age: 66
End: 2021-06-30
Attending: NURSE PRACTITIONER
Payer: COMMERCIAL

## 2021-06-30 VITALS
HEART RATE: 73 BPM | RESPIRATION RATE: 18 BRPM | DIASTOLIC BLOOD PRESSURE: 60 MMHG | OXYGEN SATURATION: 96 % | TEMPERATURE: 97.3 F | BODY MASS INDEX: 25.31 KG/M2 | WEIGHT: 167 LBS | HEIGHT: 68 IN | SYSTOLIC BLOOD PRESSURE: 142 MMHG

## 2021-06-30 DIAGNOSIS — J44.1 COPD WITH ACUTE EXACERBATION (HCC): ICD-10-CM

## 2021-06-30 PROCEDURE — 71046 X-RAY EXAM CHEST 2 VIEWS: CPT | Mod: TC | Performed by: NURSE PRACTITIONER

## 2021-06-30 PROCEDURE — U0003 INFECTIOUS AGENT DETECTION BY NUCLEIC ACID (DNA OR RNA); SEVERE ACUTE RESPIRATORY SYNDROME CORONAVIRUS 2 (SARS-COV-2) (CORONAVIRUS DISEASE [COVID-19]), AMPLIFIED PROBE TECHNIQUE, MAKING USE OF HIGH THROUGHPUT TECHNOLOGIES AS DESCRIBED BY CMS-2020-01-R: HCPCS

## 2021-06-30 PROCEDURE — 99214 OFFICE O/P EST MOD 30 MIN: CPT | Performed by: NURSE PRACTITIONER

## 2021-06-30 PROCEDURE — U0005 INFEC AGEN DETEC AMPLI PROBE: HCPCS

## 2021-06-30 RX ORDER — PREDNISONE 20 MG/1
40 TABLET ORAL DAILY
Qty: 10 TABLET | Refills: 0 | Status: SHIPPED | OUTPATIENT
Start: 2021-06-30 | End: 2021-07-05

## 2021-06-30 RX ORDER — FINASTERIDE 5 MG/1
TABLET, FILM COATED ORAL
COMMUNITY
End: 2021-06-30

## 2021-06-30 RX ORDER — MELOXICAM 7.5 MG/1
TABLET ORAL
COMMUNITY
Start: 2021-05-18 | End: 2021-06-30

## 2021-06-30 RX ORDER — DIAPER,BRIEF,INFANT-TODD,DISP
EACH MISCELLANEOUS
COMMUNITY
End: 2021-06-30

## 2021-06-30 RX ORDER — MELOXICAM 7.5 MG/1
TABLET ORAL
COMMUNITY
End: 2021-06-30

## 2021-06-30 RX ORDER — DOXYCYCLINE 100 MG/1
100 CAPSULE ORAL 2 TIMES DAILY
Qty: 10 CAPSULE | Refills: 0 | Status: SHIPPED | OUTPATIENT
Start: 2021-06-30 | End: 2021-07-05

## 2021-06-30 RX ORDER — OMEPRAZOLE 40 MG/1
CAPSULE, DELAYED RELEASE ORAL
COMMUNITY
Start: 2021-05-10 | End: 2022-03-28

## 2021-06-30 RX ORDER — OMEPRAZOLE 20 MG/1
CAPSULE, DELAYED RELEASE ORAL
COMMUNITY
Start: 2021-05-18 | End: 2021-06-30

## 2021-06-30 RX ORDER — ALBUTEROL SULFATE 90 UG/1
AEROSOL, METERED RESPIRATORY (INHALATION)
COMMUNITY
End: 2021-06-30

## 2021-06-30 ASSESSMENT — FIBROSIS 4 INDEX: FIB4 SCORE: 1.84

## 2021-06-30 NOTE — PROGRESS NOTES
Subjective:      Adrián Sheldon is a 66 y.o. male who presents with COPD (cough, SOB )      Chief Complaint   Patient presents with   • COPD     cough, SOB          Cough  This is a new problem. The current episode started 1 week ago. The problem has been gradually worsening. The problem occurs constantly. The cough is productive of sputum. Associated symptoms include: low grade fever, wheezing. Pertinent negatives include no  headaches, sweats, weight loss. Nothing aggravates the symptoms.  Patient has tried albuterol, Briot for the symptoms - minor improvement.   Has hx of COPD         Social History     Tobacco Use   • Smoking status: Former Smoker     Packs/day: 1.00     Years: 30.00     Pack years: 30.00     Types: Cigarettes     Start date: 1980     Quit date: 2019     Years since quittin.3   • Smokeless tobacco: Never Used   Vaping Use   • Vaping Use: Never used   Substance Use Topics   • Alcohol use: Yes     Comment: moderately    • Drug use: No           Past Medical History:   Diagnosis Date   • Arthritis    • Asthma without status asthmaticus 3/7/2020   • Bronchitis    • Carotid artery stenosis, asymptomatic, right 2020   • Cataract    • Chest pain    • Chest tightness    • Constipation    • COPD (chronic obstructive pulmonary disease) ([facility]) 3/7/2020   • Frequent urination    • [place] Islander measles    • Hearing difficulty    • Heartburn    • Hypertension    • Painful joint    • Shortness of breath    • Sweat, sweating, excessive    • Vision loss    • Wears glasses          Family History   Problem Relation Age of Onset   • Heart Disease Mother    • Heart Disease Father            Review of Systems   Constitutional: Negative for fever and weight loss.   HENT: negative for ear pain  Cardiovascular - denies chest pain or dyspnea  Respiratory: Positive for cough.  Cough is productive.  Negative for wheezing.    Neurological: Negative for headaches.   GI - denies nausea, vomiting or  "diarrhea  Neuro - denies numbness or tingling.            Objective:     /60   Pulse 73   Temp 36.3 °C (97.3 °F)   Resp 18   Ht 1.727 m (5' 8\")   Wt 75.8 kg (167 lb)   SpO2 96%       Physical Exam   Constitutional: patient is oriented to person, place, and time. Patient appears well-developed and well-nourished. No distress.   HENT:   Head: Normocephalic and atraumatic.   Right Ear: External ear normal.   Left Ear: External ear normal.   Nose: Mucosal edema and rhinorrhea present. Right sinus exhibits no maxillary sinus tenderness. Left sinus exhibits no maxillary sinus tenderness.   Mouth/Throat: Mucous membranes are normal. No oral lesions. Posterior oropharyngeal erythema present. No oropharyngeal exudate or posterior oropharyngeal edema.   Eyes: Conjunctivae and EOM are normal. Pupils are equal, round, and reactive to light. Right eye exhibits no discharge. Left eye exhibits no discharge. No scleral icterus.   Neck: Normal range of motion. Neck supple. No tracheal deviation present.   Cardiovascular: Normal rate, regular rhythm and normal heart sounds.  Exam reveals no friction rub.    Pulmonary/Chest: Effort normal. No respiratory distress.  Patient has  wheezing. Patient has no rhonchi or rales.    Musculoskeletal:  exhibits no edema.   Neurological: patient is alert and oriented to person, place, and time.   Skin: Skin is warm and dry. No rash noted. No erythema.   Psychiatric: patient  has a normal mood and affect.  behavior is normal.   Nursing note and vitals reviewed.      DX-CHEST-2 VIEWS  Narrative: 6/30/2021 1:20 PM    HISTORY/REASON FOR EXAM:  Cough  Shortness of breath.    TECHNIQUE/EXAM DESCRIPTION AND NUMBER OF VIEWS:  Two views of the chest.    COMPARISON:  3/31/2020    FINDINGS:  Cardiomediastinal contour is within normal limits.  No focal pulmonary consolidation.  No pleural fluid collection or pneumothorax.  No major bony abnormality is seen.  Impression: No acute cardiopulmonary " disease.       Assessment/Plan:     1. COPD with acute exacerbation (HCC)  DX-CHEST-2 VIEWS    SARS-CoV-2 PCR (24 hour In-House): Collect NP swab in VTM    predniSONE (DELTASONE) 20 MG Tab    doxycycline (MONODOX) 100 MG capsule         Supportive care, differential diagnoses, and indications for immediate follow-up discussed with patient.   Pathogenesis of diagnosis discussed including typical length and natural progression.   Instructed to return to clinic or nearest emergency department for any change in condition, further concerns, or worsening of symptoms.  Patient states understanding of the plan of care and discharge instructions.

## 2021-07-01 LAB
COVID ORDER STATUS COVID19: NORMAL
SARS-COV-2 RNA RESP QL NAA+PROBE: DETECTED
SPECIMEN SOURCE: ABNORMAL

## 2021-07-05 ENCOUNTER — TELEPHONE (OUTPATIENT)
Dept: URGENT CARE | Facility: PHYSICIAN GROUP | Age: 66
End: 2021-07-05

## 2021-07-05 NOTE — TELEPHONE ENCOUNTER
Adrián was seen by Caroline Francisco on 6/30. Positive for covid, told to quarantine for 10 days. [facility] called him and told him to quarantine for 20 days. Now he is confused on how long to quarantine. Told him I would reach out to the  provider here today since Nolan is no longer with us, but should probably do as the [facility] says and follow up with them for any other questions.       Spoke to patient on the telephone, answering all of his questions.  His symptoms started 1 week ago today.  He has never been febrile.  He does have some shortness of breath but reports a history of COPD.  Overall he is feeling much better but states he never felt really bad.  He does not return to work until the 14th.  Advised that he can safely return at that time.  Patient also asked about repeat testing.  Discussed that we do not recommend repeat testing as test results can remain positive for months post infection.

## 2021-07-13 ENCOUNTER — HOSPITAL ENCOUNTER (OUTPATIENT)
Dept: RADIOLOGY | Facility: MEDICAL CENTER | Age: 66
End: 2021-07-13
Attending: NURSE PRACTITIONER
Payer: COMMERCIAL

## 2021-07-13 ENCOUNTER — DOCUMENTATION (OUTPATIENT)
Dept: VASCULAR LAB | Facility: MEDICAL CENTER | Age: 66
End: 2021-07-13

## 2021-07-13 DIAGNOSIS — I65.21 CAROTID ARTERY STENOSIS, ASYMPTOMATIC, RIGHT: ICD-10-CM

## 2021-07-13 PROCEDURE — 93880 EXTRACRANIAL BILAT STUDY: CPT

## 2021-07-14 NOTE — PROGRESS NOTES
Carotid duplex with stable moderate stenosis on the right.  We will discuss patient follow-up visit  Continue medical management  Repeat in 1 year  APn to update surveillance counter    Michael Bloch, MD  Vascular Medicine

## 2021-07-22 ENCOUNTER — OFFICE VISIT (OUTPATIENT)
Dept: VASCULAR LAB | Facility: MEDICAL CENTER | Age: 66
End: 2021-07-22
Attending: INTERNAL MEDICINE
Payer: COMMERCIAL

## 2021-07-22 VITALS
HEIGHT: 68 IN | BODY MASS INDEX: 25.31 KG/M2 | DIASTOLIC BLOOD PRESSURE: 63 MMHG | SYSTOLIC BLOOD PRESSURE: 144 MMHG | HEART RATE: 60 BPM | WEIGHT: 167 LBS

## 2021-07-22 DIAGNOSIS — E78.49 OTHER HYPERLIPIDEMIA: ICD-10-CM

## 2021-07-22 DIAGNOSIS — I65.21 CAROTID ARTERY STENOSIS, ASYMPTOMATIC, RIGHT: ICD-10-CM

## 2021-07-22 DIAGNOSIS — I10 ESSENTIAL HYPERTENSION: ICD-10-CM

## 2021-07-22 DIAGNOSIS — Z86.73 HISTORY OF TIA (TRANSIENT ISCHEMIC ATTACK): ICD-10-CM

## 2021-07-22 PROCEDURE — 99212 OFFICE O/P EST SF 10 MIN: CPT

## 2021-07-22 PROCEDURE — 99214 OFFICE O/P EST MOD 30 MIN: CPT | Performed by: NURSE PRACTITIONER

## 2021-07-22 RX ORDER — AMLODIPINE BESYLATE AND BENAZEPRIL HYDROCHLORIDE 5; 10 MG/1; MG/1
1 CAPSULE ORAL DAILY
COMMUNITY
Start: 2021-07-12 | End: 2021-07-22

## 2021-07-22 RX ORDER — TAMSULOSIN HYDROCHLORIDE 0.4 MG/1
CAPSULE ORAL
COMMUNITY
Start: 2021-06-30 | End: 2023-01-13

## 2021-07-22 RX ORDER — AMLODIPINE BESYLATE AND BENAZEPRIL HYDROCHLORIDE 5; 20 MG/1; MG/1
1 CAPSULE ORAL DAILY
Qty: 30 CAPSULE | Refills: 5 | Status: SHIPPED | OUTPATIENT
Start: 2021-07-22 | End: 2022-05-02

## 2021-07-22 ASSESSMENT — ENCOUNTER SYMPTOMS
TREMORS: 0
SEIZURES: 0
CHILLS: 0
COUGH: 0
BLOOD IN STOOL: 0
NAUSEA: 0
VOMITING: 0
DEPRESSION: 0
ABDOMINAL PAIN: 0
BLURRED VISION: 0
HEMOPTYSIS: 0
DIZZINESS: 0
FOCAL WEAKNESS: 0
DIARRHEA: 0
SHORTNESS OF BREATH: 1
MYALGIAS: 0
DOUBLE VISION: 0
HEADACHES: 0
WHEEZING: 0
PALPITATIONS: 0
INSOMNIA: 0
FEVER: 0
ORTHOPNEA: 0
NERVOUS/ANXIOUS: 0
WEAKNESS: 0
BRUISES/BLEEDS EASILY: 0

## 2021-07-22 ASSESSMENT — FIBROSIS 4 INDEX: FIB4 SCORE: 1.84

## 2021-07-22 NOTE — PROGRESS NOTES
FOLLOW UP VASCULAR VISIT  Subjective:   Adrián Sheldon is a 66 y.o. y.o. male  who presents today 7/22/21 for   Chief Complaint   Patient presents with   • Follow-Up     initially referred by Beatrice Ireland P.Ac for eval and med mgmt of carotid artery disease     HPI:    Carotid artery disease:    No curent sx.  Reports prior chest area pain and had carotid eval as part of the w/u.   Had carotid duplex at [facility], noted to have mod stenosis of R ICA  No prior hx of CVA or carotid aa interventions     HTN:  Current HTN concerns: Denies   Current ADRs: No  HTN sx:  No current blurred or changed vision, chest pain, shortness of breath, headache, nausea, dizziness/vertigo   Home BP log: not checking   24h ABPM completed: not tested  Adherence to current HTN meds: compliant all of the time    Hyperlipidemia:    Reports no prior known HLD or statin therapy   Current treatment: none   Myalgias? Not applicable  Other adverse drug reactions? Not applicable  Last lipid profile: reviewed with patient, as noted below     Antiplatelet/anticoagulation:   Yes, Details: ASA 81mg daily , no bleeding noted     Type 2 DM:  No     CKD:    No     Sleeping disorder/MYLES:   No     Hypothyroidism:   No     Clinical evidence of ASCVD:    1) hx of MI or other ACS:  no  2) coronary or other revasc procedure: no  3) TIA/ischemic CVA: no  4) AS-related PAD (including RENETTA <0.9): no  5) other AS diseases (renal AS, AA due to AS, carotid plaque >50% stenosis): yes, carotid  6) evidence of AS from imaging (angiography, stress tests, CAC >300 or >74%ile for age/gender): no    Major ASCVD risk factors (3):    1) Age (Men>44, women>54):  yes   2) Fhx early CHD (MI, SCD, coronary revasc procedures in men <55, women <65):  no   3) cigarette smoking:   reports that he quit smoking about 2 years ago. His smoking use included cigarettes. He started smoking about 41 years ago. He has a 30.00 pack-year smoking history. He has never used smokeless  tobacco.   4) high BP >139/89 or on tx: yes   5) Low HDL-C (<40 men, <50 women): no   HDL   Date Value Ref Range Status   2020 46 >=40 mg/dL Final       Past Medical History:   Diagnosis Date   • Arthritis    • Asthma without status asthmaticus 3/7/2020   • Bronchitis    • Carotid artery stenosis, asymptomatic, right 2020   • Cataract    • Chest pain    • Chest tightness    • Constipation    • COPD (chronic obstructive pulmonary disease) ([facility]) 3/7/2020   • Frequent urination    • [place] measles    • Hearing difficulty    • Heartburn    • Hypertension    • Painful joint    • Shortness of breath    • Sweat, sweating, excessive    • Vision loss    • Wears glasses      No past surgical history on file.   Current Outpatient Medications on File Prior to Visit   Medication Sig Dispense Refill   • aspirin EC (ECOTRIN) 81 MG Tablet Delayed Response Take 81 mg by mouth every day.     • omeprazole (PRILOSEC) 40 MG delayed-release capsule      • Fluticasone Furoate-Vilanterol (BREO ELLIPTA) 200-25 MCG/INH AEROSOL POWDER, BREATH ACTIVATED Breo Ellipta 200 mcg-25 mcg/dose powder for inhalation     • albuterol 108 (90 Base) MCG/ACT Aero Soln inhalation aerosol Inhale 2 Puffs by mouth every 6 hours as needed for Shortness of Breath. 1 Each 2   • rosuvastatin (CRESTOR) 10 MG Tab Take 1 Tab by mouth every evening for 360 days. 90 Tab 3   • amlodipine-benazepril (LOTREL) 5-10 MG per capsule Take 1 capsule by mouth every day.     • tamsulosin (FLOMAX) 0.4 MG capsule        No current facility-administered medications on file prior to visit.     No Known Allergies    Family History   Problem Relation Age of Onset   • Heart Disease Mother    • Heart Disease Father         Social History     Tobacco Use   • Smoking status: Former Smoker     Packs/day: 1.00     Years: 30.00     Pack years: 30.00     Types: Cigarettes     Start date: 1980     Quit date: 2019     Years since quittin.4   • Smokeless tobacco: Never  "Used   Vaping Use   • Vaping Use: Never used   Substance Use Topics   • Alcohol use: Yes     Comment: moderately    • Drug use: No     DIET AND EXERCISE:  Weight Change:stable   BMI Readings from Last 5 Encounters:   07/22/21 25.39 kg/m²   06/30/21 25.39 kg/m²   08/28/20 25.02 kg/m²   06/16/20 25.02 kg/m²   04/20/20 24.53 kg/m²      Diet: common adult  Exercise: no regular exercise program   Review of Systems   Constitutional: Negative for chills, fever and malaise/fatigue.   HENT: Negative for nosebleeds.    Eyes: Negative for blurred vision and double vision.   Respiratory: Positive for shortness of breath (DUE TO THE SMOKE). Negative for cough, hemoptysis and wheezing.    Cardiovascular: Negative for chest pain, palpitations, orthopnea and leg swelling.   Gastrointestinal: Negative for abdominal pain, blood in stool, diarrhea, melena, nausea and vomiting.   Genitourinary: Negative for hematuria.   Musculoskeletal: Negative for joint pain and myalgias.   Neurological: Negative for dizziness, tremors, focal weakness, seizures, weakness and headaches.   Endo/Heme/Allergies: Does not bruise/bleed easily.   Psychiatric/Behavioral: Negative for depression. The patient is not nervous/anxious and does not have insomnia.       Objective:     Vitals:    07/22/21 1428 07/22/21 1431   BP: 143/62 144/63   BP Location: Right arm Right arm   Patient Position: Sitting Sitting   BP Cuff Size: Adult Adult   Pulse: (!) 50 60   Weight: 75.8 kg (167 lb)    Height: 1.727 m (5' 8\")       BP Readings from Last 5 Encounters:   07/22/21 144/63   06/30/21 142/60   08/28/20 145/64   06/16/20 122/70   04/20/20 156/82      Body mass index is 25.39 kg/m².  Physical Exam  Vitals reviewed.   Constitutional:       Appearance: Normal appearance.   HENT:      Head: Normocephalic and atraumatic.      Nose: Nose normal.      Mouth/Throat:      Mouth: Mucous membranes are moist.      Pharynx: Oropharynx is clear.   Eyes:      Extraocular Movements: " Extraocular movements intact.      Conjunctiva/sclera: Conjunctivae normal.   Cardiovascular:      Rate and Rhythm: Normal rate and regular rhythm.      Pulses: Normal pulses.           Carotid pulses are 2+ on the right side and 2+ on the left side.       Radial pulses are 2+ on the right side and 2+ on the left side.        Dorsalis pedis pulses are 2+ on the right side and 2+ on the left side.        Posterior tibial pulses are 2+ on the right side and 2+ on the left side.      Heart sounds: Normal heart sounds.      Comments:    Spider telangectasia:       RLE:  None      LLE: none   Varicosities:           RLE: none      LLE: none   Corona phlebectatica:      RLE:  None        LLE:  None   Cording:         RLE:  None     LLE: None     Pulmonary:      Effort: Pulmonary effort is normal.      Breath sounds: Normal breath sounds.   Abdominal:      General: Abdomen is flat. Bowel sounds are normal.      Palpations: Abdomen is soft.   Musculoskeletal:      Cervical back: Normal range of motion and neck supple.      Right lower leg: No edema.      Left lower leg: No edema.   Skin:     General: Skin is warm and dry.      Capillary Refill: Capillary refill takes less than 2 seconds.   Neurological:      General: No focal deficit present.      Mental Status: He is alert and oriented to person, place, and time. Mental status is at baseline.   Psychiatric:         Mood and Affect: Mood normal.         Behavior: Behavior normal.         Lab Results   Component Value Date    CHOLSTRLTOT 167 03/08/2020     (H) 03/08/2020    HDL 46 03/08/2020    TRIGLYCERIDE 87 03/08/2020               Lab Results   Component Value Date    SODIUM 136 03/08/2020    POTASSIUM 4.4 03/08/2020    CHLORIDE 107 03/08/2020    CO2 22 03/08/2020    GLUCOSE 112 (H) 03/08/2020    BUN 13 04/21/2020    CREATININE 0.80 04/21/2020    IFAFRICA >60 04/21/2020    IFNOTAFR >60 04/21/2020        Lab Results   Component Value Date    WBC 6.5 04/21/2020     RBC 5.10 2020    HEMOGLOBIN 16.1 2020    HEMATOCRIT 47.5 2020    MCV 93.1 2020    MCH 31.6 2020    MCHC 33.9 2020    MPV 10.7 2020       VASCULAR IMAGING:   Last EKG:   Results for orders placed or performed during the hospital encounter of 20   EKG   Result Value Ref Range    Report       [facility]    Test Date:  2020  Pt Name:    REJI MAYA              Department: ER  MRN:        [id number]                      Room:        26  Gender:     Male                         Technician: [id number]  :        1955                   Requested By:EMRE LOPEZ  Order #:    [id number]                    Reading MD: EMRE LOPEZ MD    Measurements  Intervals                                Axis  Rate:       60                           P:          -3  WY:         152                          QRS:        49  QRSD:       94                           T:          -8  QT:         412  QTc:        412    Interpretive Statements  SINUS RHYTHM  BORDERLINE T ABNORMALITIES, INFERIOR LEADS  Compared to ECG 2018 20:47:23  T-wave abnormality now present  Sinus bradycardia no longer present  Electronically Signed On 3-7-2020 14:10:37 PST by EMRE LOPEZ MD     EKG in four (4) hours   Result Value Ref Range    Report       [facility]    Test Date:  2020  Pt Name:    REJI MAYA              Department: CPU  MRN:        [id number]                      Room:       T206  Gender:     Male                         Technician: EAB  :        1955                   Requested By:ALEKSANDRA MARTINEZ  Order #:    [id number]                    Reading MD: Sonia Loza MD    Measurements  Intervals                                Axis  Rate:       64                           P:          42  WY:         152                          QRS:        58  QRSD:       96                           T:          5  QT:          388  QTc:        401    Interpretive Statements  SINUS RHYTHM  NON-SPECIFIC T-WAVE CHANGES  Compared to ECG 03/07/2020 13:45:25  No significant change noted  Electronically Signed On 3-7-2020 23:29:08 PST by Sonia Loza MD       CTA neck 11/2018  Unremarkable CT angiogram of the neck. No high-grade stenosis, large vessel occlusion, aneurysm or dissection.    CTA head 11/2018   1.  No large vessel occlusion, high-grade stenosis or aneurysm of the [place].  2.  No CT evidence of acute infarct, hemorrhage or mass.  1.  Cerebral blood flow less than 30% likely representing completed infarct = 0 mL.   2.  T Max more than 6 seconds likely representing combination of completed infarct and ischemia = 0 mL.   3.  Mismatched volume likely representing ischemic brain/penumbra = 0 mL.   4.  Please note that the cerebral perfusion was performed on the limited brain tissue around the basal ganglia region. Infarct/ischemia outside the CT perfusion sections can be missed in this study.    MRI brain 2018   1.  No acute abnormality.  2.  Mild cerebral atrophy.  3.  Minimal chronic microvascular ischemic disease.    MPI 3/2020   NUCLEAR IMAGING INTERPRETATION   No evidence of significant jeopardized viable myocardium or prior myocardial    infarction.   Normal left ventricular size, ejection fraction, and wall motion.    Echo 3/2020  Normal left ventricular systolic function. Left ventricular ejection   fraction is visually estimated to be 60%.   Normal diastolic function.  Normal inferior vena cava size without inspiratory collapse.    CT chest 5/2020  1.  There is no lung mass or destructive bony process to explain the patient's right chest wall pain.  2.  There is a nonspecific 4 mm right upper lobe lung nodule, probably an area of scarring.  3.  There is a calcified granuloma in the left lower lobe with small calcified mediastinal nodes consistent with prior granulomatous inflammatory process.  4.  There is aortic  atherosclerosis.    Carotid duplex 7/15/2020 ([place])  Moderate 40-69% R ICA ,  No stenosis L ICA   bilat moderate atherosclerotic changes     Carotid duplex 7/13/21  Mildly elevated velocity in the right proximal ICA may suggest 50-69%    stenosis.    Medical Decision Making:  Today's Assessment / Status / Plan:     1. Carotid artery stenosis, asymptomatic, right     2. History of TIA (transient ischemic attack)     3. Essential hypertension     4. Other hyperlipidemia         Patient Type: Primary Prevention    Etiology of Established CVD if Present:   1) R ICA stenosis, moderate   2) aortic athero  3) TIA, 11/2018    Antithrombotic therapy:  Indication: carotid stenosis   Anti-Platelet/Anti-Coagulant Tx: yes  - continue ASA 81mg daily     Lipid Management: Qualifies for Statin Therapy Based on 2018 ACC/AHA Guidelines: yes  Calculated 10-Year Risk of ASCVD (if LDL <189, no CKD G3b/4/5): N/A  Currently on Statin: Started at this visit  NLA/ACC/AHA risk category:   High:  Carotid disease, 3+ major risk factors    Tx threshold: non-HDL-C >129, LDL-C >99  Tx goal:  non-HDL <130, LDL-C <100 (optional LDLc <70)  At goal:  no  Plan:  - reinforced ongoing TLC measures   - monitor labs   - Continue rosuvastatin 10mg daily with vit D3 5,000 units daily     Blood Pressure Management:Goal: ACC/AHA (2017) goal <130/80  Home BP at goal:  yes  Office BP at goal:  no  Inidications of end organ damage:   Echo/EKG: N/A    UACR: pending    Renal parenchymal disease:  normal   Ophthalmo: N/A    Device candidate? no  Plan:   Monitoring:   - start/continue home BP monitoring, reviewed correct technique, provide BP log and instructions  - order 24h ABPM:  NO  - monitor lytes/gfr routinely   - contact office if BP consistently >140/>90 to discussion of tx adjustments   Medications:  ACEi/ARB: Increase benazepril  To 20mg QHS (combo)  DHP-CCB: increase amlodipine to 5mg QHS (combo)  Thiazide: none   Valerio-receptor Antagonist: not  indicated at this time     Glycemic Status: Normal    Smoking: maintenance stage    reports that he quit smoking about 2 years ago. His smoking use included cigarettes. He started smoking about 41 years ago. He has a 30.00 pack-year smoking history. He has never used smokeless tobacco.   Provided strong recommendation for complete cessation and informed this is the primary contributor to the majority of all ASCVD and cancer-related conditions and can result in significant morbidity and early mortality.   - reviewed resources for cessation including tobacco cessation clinic visit, pharmacotx meds, quit lines  - review at every visit      Physical Activity: continue healthy activity to improve CV fitness, see care instructions for additional details     Weight Management and Nutrition: Dietary plan was discussed with patient at this visit including DASH, low sodium and/or as outlined in care instructions     Other:   1) R ICA stenosis, moderate, no symptoms.   No indications or surgical intervention at this time   - continue med mgmt   - update duplex in 1 yr (July), consider CTA if worsening stenosis     2) TIA, episode of ataxia   Normal MRI, CTA head/neck   - continue med mgmt     Instructed to follow-up with PCP for remainder of adult medical needs: yes  We will partner with other providers in the management of established vascular disease and cardiometabolic risk factors.    Studies to Be Obtained: carotid duplex 7/2022  -  Labs to Be Obtained: Lipid, cmp and prior labs that did not get done on last visit     Follow up in: 3 months with aprn  Time: 30-39min - chart review/prep, review of other providers' records, imaging/lab review, face-to-face time for history/examination, ordering, prescribing,  review of results/meds/ treatment plan with patient/family/caregiver, documentation in EMR, care coordination (as needed)      RADAMES Huizar.  [facility]   [facility]

## 2021-09-01 DIAGNOSIS — E78.49 OTHER HYPERLIPIDEMIA: ICD-10-CM

## 2021-09-01 DIAGNOSIS — I65.21 CAROTID ARTERY STENOSIS, ASYMPTOMATIC, RIGHT: ICD-10-CM

## 2021-09-01 RX ORDER — ROSUVASTATIN CALCIUM 10 MG/1
TABLET, COATED ORAL
Qty: 90 TABLET | Refills: 3 | Status: SHIPPED | OUTPATIENT
Start: 2021-09-01 | End: 2021-11-02 | Stop reason: SDUPTHER

## 2021-10-13 ENCOUNTER — APPOINTMENT (OUTPATIENT)
Dept: RADIOLOGY | Facility: IMAGING CENTER | Age: 66
End: 2021-10-13
Attending: PHYSICIAN ASSISTANT
Payer: COMMERCIAL

## 2021-10-13 ENCOUNTER — OFFICE VISIT (OUTPATIENT)
Dept: URGENT CARE | Facility: PHYSICIAN GROUP | Age: 66
End: 2021-10-13
Payer: COMMERCIAL

## 2021-10-13 VITALS
BODY MASS INDEX: 25.31 KG/M2 | DIASTOLIC BLOOD PRESSURE: 64 MMHG | HEART RATE: 59 BPM | TEMPERATURE: 97.3 F | WEIGHT: 167 LBS | OXYGEN SATURATION: 98 % | SYSTOLIC BLOOD PRESSURE: 138 MMHG | HEIGHT: 68 IN | RESPIRATION RATE: 16 BRPM

## 2021-10-13 DIAGNOSIS — R06.02 SOB (SHORTNESS OF BREATH): ICD-10-CM

## 2021-10-13 DIAGNOSIS — R07.89 CHEST WALL PAIN: ICD-10-CM

## 2021-10-13 DIAGNOSIS — R53.83 FATIGUE, UNSPECIFIED TYPE: ICD-10-CM

## 2021-10-13 LAB — GLUCOSE BLD-MCNC: 110 MG/DL (ref 70–100)

## 2021-10-13 PROCEDURE — 71046 X-RAY EXAM CHEST 2 VIEWS: CPT | Mod: TC,FY | Performed by: PHYSICIAN ASSISTANT

## 2021-10-13 PROCEDURE — 82962 GLUCOSE BLOOD TEST: CPT | Performed by: PHYSICIAN ASSISTANT

## 2021-10-13 PROCEDURE — 99214 OFFICE O/P EST MOD 30 MIN: CPT | Performed by: PHYSICIAN ASSISTANT

## 2021-10-13 RX ORDER — PREDNISONE 10 MG/1
TABLET ORAL
Qty: 15 TABLET | Refills: 0 | Status: SHIPPED | OUTPATIENT
Start: 2021-10-13 | End: 2021-10-28

## 2021-10-13 ASSESSMENT — FIBROSIS 4 INDEX: FIB4 SCORE: 1.84

## 2021-10-13 NOTE — PROGRESS NOTES
Chief Complaint   Patient presents with   • Tingling     bilat feet, states weird things going on with body   • Shortness of Breath     pains that come and go on (R) side of chest-requesting xray d/t hx of asthma   • Other     wants to be checked for diabetes        HISTORY OF PRESENT ILLNESS: Patient is a 66 y.o. male who presents today for the following:    Right sided CP off/on x 2 months  Happening more often  Works nights  Worse when bending over - causes SOB; worse with pressure, arm movement  No pain with breathing, coughing  H/o COPD/asthma  Sees vascular medicine and pulmonary  Denies fever, chills  Chronic body aches  Recent weight gain over the last year or two; started after quitting smoking 2 years ago, after 1st MI    Patient Active Problem List    Diagnosis Date Noted   • History of TIA (transient ischemic attack) 08/28/2020   • Other hyperlipidemia 08/28/2020   • Former smoker 08/28/2020   • Carotid artery stenosis, asymptomatic, right 08/28/2020   • Intercostal pain, R side, musculoskeletal chondritis 04/20/2020   • Asthma without status asthmaticus 03/07/2020   • COPD (chronic obstructive pulmonary disease) ([facility]) 03/07/2020   • Benign prostatic hyperplasia with urinary obstruction 03/03/2020   • Incomplete emptying of bladder 03/03/2020   • Lesion of penis 03/03/2020   • Nocturia 03/03/2020   • Raised prostate specific antigen 03/03/2020   • HTN (hypertension) 11/22/2018       Allergies:Patient has no known allergies.    Current Outpatient Medications Ordered in Epic   Medication Sig Dispense Refill   • predniSONE (DELTASONE) 10 MG Tab 50 mg x 1 day; 40 mg x 1 day; 30 mg x 1 day; 20 mg x 1 day; 10 mg x 1 day 15 Tablet 0   • rosuvastatin (CRESTOR) 10 MG Tab TAKE 1 TABLET BY MOUTH EVERY EVENING 90 Tablet 3   • tamsulosin (FLOMAX) 0.4 MG capsule      • aspirin EC (ECOTRIN) 81 MG Tablet Delayed Response Take 81 mg by mouth every day.     • amlodipine-benazepril (LOTREL) 5-20 MG per capsule Take 1  capsule by mouth every day. 30 capsule 5   • omeprazole (PRILOSEC) 40 MG delayed-release capsule      • Fluticasone Furoate-Vilanterol (BREO ELLIPTA) 200-25 MCG/INH AEROSOL POWDER, BREATH ACTIVATED Breo Ellipta 200 mcg-25 mcg/dose powder for inhalation     • albuterol 108 (90 Base) MCG/ACT Aero Soln inhalation aerosol Inhale 2 Puffs by mouth every 6 hours as needed for Shortness of Breath. 1 Each 2     No current Epic-ordered facility-administered medications on file.       Past Medical History:   Diagnosis Date   • Arthritis    • Asthma without status asthmaticus 3/7/2020   • Bronchitis    • Carotid artery stenosis, asymptomatic, right 2020   • Cataract    • Chest pain    • Chest tightness    • Constipation    • COPD (chronic obstructive pulmonary disease) ([facility]) 3/7/2020   • Frequent urination    • Latvian measles    • Hearing difficulty    • Heartburn    • Hypertension    • Painful joint    • Shortness of breath    • Sweat, sweating, excessive    • Vision loss    • Wears glasses        Social History     Tobacco Use   • Smoking status: Former Smoker     Packs/day: 1.00     Years: 30.00     Pack years: 30.00     Types: Cigarettes     Start date: 1980     Quit date: 2019     Years since quittin.6   • Smokeless tobacco: Never Used   Vaping Use   • Vaping Use: Never used   Substance Use Topics   • Alcohol use: Yes     Comment: moderately    • Drug use: No       Family Status   Relation Name Status   • Mo     • Fa       Family History   Problem Relation Age of Onset   • Heart Disease Mother    • Heart Disease Father        Review of Systems:    Constitutional ROS: No unexpected change in weight, No weakness, No fatigue  Pulmonary ROS: + SOB  Cardiovascular ROS: No diaphoresis, No edema, No palpitations  Musculoskeletal/Extremities ROS: right sided CP  Hematologic/Lymphatic ROS: No chills, No night sweats, No weight loss  Skin/Integumentary ROS: No edema, No evidence of rash, No  "itching      Exam:  /64   Pulse (!) 59   Temp 36.3 °C (97.3 °F)   Resp 16   Ht 1.727 m (5' 8\")   Wt 75.8 kg (167 lb)   SpO2 98%   General: Well developed, well nourished. No distress.    HENT: Head is grossly normal.  Pulmonary: Unlabored respiratory effort.  Clear to auscultation bilaterally without wheezing, rhonchi, rales.  No friction rub noted.  Cardiovascular: Regular rate and rhythm without murmur.  No friction rub noted.  Neurologic: Grossly nonfocal. No facial asymmetry noted.  Musculoskeletal: Right-sided chest wall pain reproducible on palpation.  No left-sided chest wall pain noted.  Skin: Warm, dry, good turgor. No rashes in visible areas.   Psych: Normal mood. Alert and oriented to person, place and time.    Point-of-care glucose:110    EKG, for interpretation: Sinus rhythm, bradycardic rate.  Questionable ST elevation in V2 and V3 but no significant change from previous EKGs, most recently March/2020.  Questionable peaked T waves but may be normal variant.    Assessment/Plan:  Chest x-ray is negative for any acute cardiopulmonary process.  Starting prednisone as I suspect this is inflammatory.  Discussed appropriate over-the-counter symptomatic medication, and when to return to clinic. Follow up for worsening or persistent symptoms.  1. Chest wall pain  predniSONE (DELTASONE) 10 MG Tab   2. SOB (shortness of breath)  DX-CHEST-2 VIEWS   3. Fatigue, unspecified type  POCT glucose       "

## 2021-10-28 ENCOUNTER — HOSPITAL ENCOUNTER (OUTPATIENT)
Dept: LAB | Facility: MEDICAL CENTER | Age: 66
End: 2021-10-28
Attending: NURSE PRACTITIONER
Payer: COMMERCIAL

## 2021-10-28 ENCOUNTER — OFFICE VISIT (OUTPATIENT)
Dept: VASCULAR LAB | Facility: MEDICAL CENTER | Age: 66
End: 2021-10-28
Attending: INTERNAL MEDICINE
Payer: COMMERCIAL

## 2021-10-28 VITALS
HEART RATE: 59 BPM | HEIGHT: 68 IN | DIASTOLIC BLOOD PRESSURE: 64 MMHG | SYSTOLIC BLOOD PRESSURE: 136 MMHG | WEIGHT: 165 LBS | BODY MASS INDEX: 25.01 KG/M2

## 2021-10-28 DIAGNOSIS — I10 PRIMARY HYPERTENSION: ICD-10-CM

## 2021-10-28 DIAGNOSIS — Z86.73 HISTORY OF TIA (TRANSIENT ISCHEMIC ATTACK): ICD-10-CM

## 2021-10-28 DIAGNOSIS — E78.49 OTHER HYPERLIPIDEMIA: ICD-10-CM

## 2021-10-28 DIAGNOSIS — I65.21 CAROTID ARTERY STENOSIS, ASYMPTOMATIC, RIGHT: ICD-10-CM

## 2021-10-28 DIAGNOSIS — I10 ESSENTIAL HYPERTENSION: ICD-10-CM

## 2021-10-28 LAB
ALBUMIN SERPL BCP-MCNC: 4.4 G/DL (ref 3.2–4.9)
ALBUMIN/GLOB SERPL: 2.4 G/DL
ALP SERPL-CCNC: 58 U/L (ref 30–99)
ALT SERPL-CCNC: 15 U/L (ref 2–50)
ANION GAP SERPL CALC-SCNC: 8 MMOL/L (ref 7–16)
AST SERPL-CCNC: 14 U/L (ref 12–45)
BILIRUB SERPL-MCNC: 0.5 MG/DL (ref 0.1–1.5)
BUN SERPL-MCNC: 12 MG/DL (ref 8–22)
CALCIUM SERPL-MCNC: 9.3 MG/DL (ref 8.5–10.5)
CHLORIDE SERPL-SCNC: 103 MMOL/L (ref 96–112)
CHOLEST SERPL-MCNC: 160 MG/DL (ref 100–199)
CO2 SERPL-SCNC: 28 MMOL/L (ref 20–33)
CREAT SERPL-MCNC: 0.92 MG/DL (ref 0.5–1.4)
GLOBULIN SER CALC-MCNC: 1.8 G/DL (ref 1.9–3.5)
GLUCOSE SERPL-MCNC: 119 MG/DL (ref 65–99)
HDLC SERPL-MCNC: 42 MG/DL
LDLC SERPL CALC-MCNC: 94 MG/DL
POTASSIUM SERPL-SCNC: 4.5 MMOL/L (ref 3.6–5.5)
PROT SERPL-MCNC: 6.2 G/DL (ref 6–8.2)
SODIUM SERPL-SCNC: 139 MMOL/L (ref 135–145)
TRIGL SERPL-MCNC: 120 MG/DL (ref 0–149)

## 2021-10-28 PROCEDURE — 80061 LIPID PANEL: CPT

## 2021-10-28 PROCEDURE — 36415 COLL VENOUS BLD VENIPUNCTURE: CPT

## 2021-10-28 PROCEDURE — 99212 OFFICE O/P EST SF 10 MIN: CPT

## 2021-10-28 PROCEDURE — 80053 COMPREHEN METABOLIC PANEL: CPT

## 2021-10-28 PROCEDURE — 99214 OFFICE O/P EST MOD 30 MIN: CPT | Performed by: NURSE PRACTITIONER

## 2021-10-28 ASSESSMENT — ENCOUNTER SYMPTOMS
CLAUDICATION: 0
HEMOPTYSIS: 0
PALPITATIONS: 0
PND: 0
SPUTUM PRODUCTION: 0
WHEEZING: 0
CHILLS: 0
COUGH: 0
FEVER: 0
ORTHOPNEA: 0
SHORTNESS OF BREATH: 0

## 2021-10-28 ASSESSMENT — FIBROSIS 4 INDEX: FIB4 SCORE: 1.84

## 2021-10-28 NOTE — PROGRESS NOTES
FOLLOW UP VASCULAR VISIT  Subjective:   Adrián Sheldon is a 66 y.o. y.o. male  who presents today 10/28/21 for   Chief Complaint   Patient presents with   • Follow-Up     initially referred by Beatrice Ireland P.Ac for eval and med mgmt of carotid artery disease     HPI:    Carotid artery disease:   No curent sx.       HTN:  Current HTN concerns: Denies   Current ADRs: No  HTN sx:  No current blurred or changed vision, chest pain, shortness of breath, headache, nausea, dizziness/vertigo   Home BP log: not checking   24h ABPM completed: not tested  Adherence to current HTN meds: compliant all of the time    Hyperlipidemia:    Tolerating rosuvastatin   Current treatment: none   Myalgias? No  Other adverse drug reactions? No  Last lipid profile: None, needs updated labs now     Antiplatelet/anticoagulation:   Yes, Details: ASA 81mg daily , no bleeding noted                      No past surgical history on file.   Current Outpatient Medications on File Prior to Visit   Medication Sig Dispense Refill   • rosuvastatin (CRESTOR) 10 MG Tab TAKE 1 TABLET BY MOUTH EVERY EVENING 90 Tablet 3   • tamsulosin (FLOMAX) 0.4 MG capsule      • aspirin EC (ECOTRIN) 81 MG Tablet Delayed Response Take 81 mg by mouth every day.     • amlodipine-benazepril (LOTREL) 5-20 MG per capsule Take 1 capsule by mouth every day. 30 capsule 5   • omeprazole (PRILOSEC) 40 MG delayed-release capsule      • Fluticasone Furoate-Vilanterol (BREO ELLIPTA) 200-25 MCG/INH AEROSOL POWDER, BREATH ACTIVATED Breo Ellipta 200 mcg-25 mcg/dose powder for inhalation     • albuterol 108 (90 Base) MCG/ACT Aero Soln inhalation aerosol Inhale 2 Puffs by mouth every 6 hours as needed for Shortness of Breath. 1 Each 2   • predniSONE (DELTASONE) 10 MG Tab 50 mg x 1 day; 40 mg x 1 day; 30 mg x 1 day; 20 mg x 1 day; 10 mg x 1 day (Patient not taking: Reported on 10/28/2021) 15 Tablet 0     No current facility-administered medications on file prior to visit.     No  "Known Allergies     Social History     Tobacco Use   • Smoking status: Former Smoker     Packs/day: 1.00     Years: 30.00     Pack years: 30.00     Types: Cigarettes     Start date: 1980     Quit date: 2019     Years since quittin.6   • Smokeless tobacco: Never Used   Vaping Use   • Vaping Use: Never used   Substance Use Topics   • Alcohol use: Yes     Comment: moderately    • Drug use: No     DIET AND EXERCISE:  Weight Change:stable   BMI Readings from Last 5 Encounters:   10/28/21 25.09 kg/m²   10/13/21 25.39 kg/m²   21 25.39 kg/m²   21 25.39 kg/m²   20 25.02 kg/m²      Diet: common adult  Exercise: no regular exercise program   Review of Systems   Constitutional: Negative for chills, fever and malaise/fatigue.   Respiratory: Negative for cough, hemoptysis, sputum production, shortness of breath and wheezing.    Cardiovascular: Negative for chest pain, palpitations, orthopnea, claudication, leg swelling and PND.      Objective:     Vitals:    10/28/21 0950   BP: 136/64   BP Location: Left arm   Patient Position: Sitting   BP Cuff Size: Adult   Pulse: (!) 59   Weight: 74.8 kg (165 lb)   Height: 1.727 m (5' 8\")      BP Readings from Last 5 Encounters:   10/28/21 136/64   10/13/21 138/64   21 144/63   21 142/60   20 145/64      Body mass index is 25.09 kg/m².  Physical Exam  Vitals reviewed.   Constitutional:       Appearance: Normal appearance.   HENT:      Head: Normocephalic and atraumatic.      Nose: Nose normal.      Mouth/Throat:      Mouth: Mucous membranes are moist.      Pharynx: Oropharynx is clear.   Eyes:      Extraocular Movements: Extraocular movements intact.      Conjunctiva/sclera: Conjunctivae normal.   Cardiovascular:      Rate and Rhythm: Normal rate and regular rhythm.      Pulses: Normal pulses.           Carotid pulses are 2+ on the right side and 2+ on the left side.       Radial pulses are 2+ on the right side and 2+ on the left side.        " Dorsalis pedis pulses are 2+ on the right side and 2+ on the left side.        Posterior tibial pulses are 2+ on the right side and 2+ on the left side.      Heart sounds: Normal heart sounds.      Comments:    Spider telangectasia:       RLE:  None      LLE: none   Varicosities:           RLE: none      LLE: none   Corona phlebectatica:      RLE:  None        LLE:  None   Cording:         RLE:  None     LLE: None     Pulmonary:      Effort: Pulmonary effort is normal.      Breath sounds: Normal breath sounds.   Abdominal:      General: Abdomen is flat. Bowel sounds are normal.      Palpations: Abdomen is soft.   Musculoskeletal:      Cervical back: Normal range of motion and neck supple.      Right lower leg: No edema.      Left lower leg: No edema.   Skin:     General: Skin is warm and dry.      Capillary Refill: Capillary refill takes less than 2 seconds.   Neurological:      General: No focal deficit present.      Mental Status: He is alert and oriented to person, place, and time. Mental status is at baseline.   Psychiatric:         Mood and Affect: Mood normal.         Behavior: Behavior normal.       Lab Results   Component Value Date    CHOLSTRLTOT 167 03/08/2020     (H) 03/08/2020    HDL 46 03/08/2020    TRIGLYCERIDE 87 03/08/2020      Lab Results   Component Value Date    SODIUM 136 03/08/2020    POTASSIUM 4.4 03/08/2020    CHLORIDE 107 03/08/2020    CO2 22 03/08/2020    GLUCOSE 112 (H) 03/08/2020    BUN 13 04/21/2020    CREATININE 0.80 04/21/2020    IFAFRICA >60 04/21/2020    IFNOTAFR >60 04/21/2020        Lab Results   Component Value Date    WBC 6.5 04/21/2020    RBC 5.10 04/21/2020    HEMOGLOBIN 16.1 04/21/2020    HEMATOCRIT 47.5 04/21/2020    MCV 93.1 04/21/2020    MCH 31.6 04/21/2020    MCHC 33.9 04/21/2020    MPV 10.7 04/21/2020     VASCULAR IMAGING:   Last EKG:   Results for orders placed or performed during the hospital encounter of 03/07/20   EKG   Result Value Ref Range    Report        [facility] Emergency Dept.    Test Date:  2020  Pt Name:    REJI LEVINETERO              Department: ER  MRN:        [id number]                      Room:       GR 26  Gender:     Male                         Technician: [id number]  :        1955                   Requested By:EMRE LOPEZ  Order #:    [id number]                    Reading MD: EMRE LOPEZ MD    Measurements  Intervals                                Axis  Rate:       60                           P:          -3  MT:         152                          QRS:        49  QRSD:       94                           T:          -8  QT:         412  QTc:        412    Interpretive Statements  SINUS RHYTHM  BORDERLINE T ABNORMALITIES, INFERIOR LEADS  Compared to ECG 2018 20:47:23  T-wave abnormality now present  Sinus bradycardia no longer present  Electronically Signed On 3-7-2020 14:10:37 PST by EMRE LOPEZ MD     EKG in four (4) hours   Result Value Ref Range    Report       [facility]    Test Date:  2020  Pt Name:    REJI LEVINETERO              Department: CPU  MRN:        [id number]                      Room:       T206  Gender:     Male                         Technician: [id number]  :        1955                   Requested By:ALEKSANDRA MARTINEZ  Order #:    [id number]                    Reading MD: Sonia Loza MD    Measurements  Intervals                                Axis  Rate:       64                           P:          42  MT:         152                          QRS:        58  QRSD:       96                           T:          5  QT:         388  QTc:        401    Interpretive Statements  SINUS RHYTHM  NON-SPECIFIC T-WAVE CHANGES  Compared to ECG 2020 13:45:25  No significant change noted  Electronically Signed On 3-7-2020 23:29:08 PST by Sonia Loza MD       CTA neck 2018  Unremarkable CT angiogram of the neck. No high-grade stenosis, large vessel occlusion, aneurysm or  dissection.    CTA head 11/2018   1.  No large vessel occlusion, high-grade stenosis or aneurysm of the [place].  2.  No CT evidence of acute infarct, hemorrhage or mass.  1.  Cerebral blood flow less than 30% likely representing completed infarct = 0 mL.   2.  T Max more than 6 seconds likely representing combination of completed infarct and ischemia = 0 mL.   3.  Mismatched volume likely representing ischemic brain/penumbra = 0 mL.   4.  Please note that the cerebral perfusion was performed on the limited brain tissue around the basal ganglia region. Infarct/ischemia outside the CT perfusion sections can be missed in this study.    MRI brain 2018   1.  No acute abnormality.  2.  Mild cerebral atrophy.  3.  Minimal chronic microvascular ischemic disease.    MPI 3/2020   NUCLEAR IMAGING INTERPRETATION   No evidence of significant jeopardized viable myocardium or prior myocardial    infarction.   Normal left ventricular size, ejection fraction, and wall motion.    Echo 3/2020  Normal left ventricular systolic function. Left ventricular ejection   fraction is visually estimated to be 60%.   Normal diastolic function.  Normal inferior vena cava size without inspiratory collapse.    CT chest 5/2020  1.  There is no lung mass or destructive bony process to explain the patient's right chest wall pain.  2.  There is a nonspecific 4 mm right upper lobe lung nodule, probably an area of scarring.  3.  There is a calcified granuloma in the left lower lobe with small calcified mediastinal nodes consistent with prior granulomatous inflammatory process.  4.  There is aortic atherosclerosis.    Carotid duplex 7/15/2020 ([place])  Moderate 40-69% R ICA ,  No stenosis L ICA   bilat moderate atherosclerotic changes     Carotid duplex 7/13/21  Mildly elevated velocity in the right proximal ICA may suggest 50-69%    stenosis.    Medical Decision Making:  Today's Assessment / Status / Plan:     1. Primary hypertension  Comp  Metabolic Panel    MICROALBUMIN CREAT RATIO URINE   2. Carotid artery stenosis, asymptomatic, right  Lipid Profile   3. Other hyperlipidemia       Patient Type: Primary Prevention    Etiology of Established CVD if Present:   1) R ICA stenosis, moderate   2) aortic athero  3) TIA, 11/2018    Antithrombotic therapy:  Indication: carotid stenosis   Anti-Platelet/Anti-Coagulant Tx: yes  - continue ASA 81mg daily     Lipid Management: Qualifies for Statin Therapy Based on 2018 ACC/AHA Guidelines: yes  Calculated 10-Year Risk of ASCVD (if LDL <189, no CKD G3b/4/5): N/A  Currently on Statin: Started at this visit  NLA/ACC/AHA risk category:   High:  Carotid disease, 3+ major risk factors    Tx threshold: non-HDL-C >129, LDL-C >99  Tx goal:  non-HDL <130, LDL-C <100 (optional LDLc <70)  At goal:  no  Plan:  - reinforced ongoing TLC measures   - update labs now   - Continue rosuvastatin 10mg daily with vit D3 5,000 units daily     Blood Pressure Management: Goal: ACC/AHA (2017) goal <130/80  Home BP at goal:  yes  Office BP at goal:  no  Inidications of end organ damage:   Echo/EKG: N/A    UACR: pending    Renal parenchymal disease:  normal   Ophthalmo: N/A    Device candidate? no  Plan:   Monitoring:   - start/continue home BP monitoring, reviewed correct technique, provide BP log and instructions  - order 24h ABPM:  NO  - monitor lytes/gfr routinely   - contact office if BP consistently >140/>90 to discussion of tx adjustments   Medications:  ACEi/ARB: continue benazepril to 20mg QHS (combo)  DHP-CCB: continue amlodipine to 5mg QHS (combo)  Thiazide: none   Valerio-receptor Antagonist: not indicated at this time     Glycemic Status: Normal    Smoking: maintenance stage    reports that he quit smoking about 2 years ago. His smoking use included cigarettes. He started smoking about 41 years ago. He has a 30.00 pack-year smoking history. He has never used smokeless tobacco.   Provided strong recommendation for complete cessation  and informed this is the primary contributor to the majority of all ASCVD and cancer-related conditions and can result in significant morbidity and early mortality.   - reviewed resources for cessation including tobacco cessation clinic visit, pharmacotx meds, quit lines  - review at every visit      Physical Activity: continue healthy activity to improve CV fitness, see care instructions for additional details     Weight Management and Nutrition: Dietary plan was discussed with patient at this visit including DASH, low sodium and/or as outlined in care instructions     Other:   1) R ICA stenosis, moderate, no symptoms.   No indications or surgical intervention at this time   - continue med mgmt   - update duplex in 1 yr (July), consider CTA if worsening stenosis     2) TIA, episode of ataxia   Normal MRI, CTA head/neck   - continue med mgmt     Instructed to follow-up with PCP for remainder of adult medical needs: yes  We will partner with other providers in the management of established vascular disease and cardiometabolic risk factors.    Studies to Be Obtained: carotid duplex 7/2022    Labs to Be Obtained: Lipid/CMP- he will get done today- will call with result    Follow up in: 6 months     RADAMES Pena.  [facility]   [facility]

## 2021-11-01 ENCOUNTER — DOCUMENTATION (OUTPATIENT)
Dept: VASCULAR LAB | Facility: MEDICAL CENTER | Age: 66
End: 2021-11-01

## 2021-11-01 DIAGNOSIS — R73.01 IMPAIRED FASTING GLUCOSE: ICD-10-CM

## 2021-11-01 DIAGNOSIS — E78.49 OTHER HYPERLIPIDEMIA: ICD-10-CM

## 2021-11-01 DIAGNOSIS — I10 PRIMARY HYPERTENSION: ICD-10-CM

## 2021-11-01 NOTE — PROGRESS NOTES
Lab work reviewed and reported to the pt. New orders for 6 months out for lab work sent to his home. RADAMES Huizar.

## 2021-11-02 ENCOUNTER — TELEPHONE (OUTPATIENT)
Dept: VASCULAR LAB | Facility: MEDICAL CENTER | Age: 66
End: 2021-11-02

## 2021-11-02 DIAGNOSIS — I65.21 CAROTID ARTERY STENOSIS, ASYMPTOMATIC, RIGHT: ICD-10-CM

## 2021-11-02 DIAGNOSIS — E78.49 OTHER HYPERLIPIDEMIA: ICD-10-CM

## 2021-11-02 RX ORDER — ROSUVASTATIN CALCIUM 20 MG/1
20 TABLET, COATED ORAL DAILY
Qty: 90 TABLET | Refills: 3 | Status: SHIPPED | OUTPATIENT
Start: 2021-11-02 | End: 2022-11-04

## 2021-11-02 NOTE — PROGRESS NOTES
Due to pt's moderate carotid stenosis, ideally recommend LDL <70.  His current LDL is 94.  Will increase Crestor to 20mg and inform pt.    Lizet ROSE  [facility]

## 2021-11-02 NOTE — TELEPHONE ENCOUNTER
LM for pt that his Crestor has been increased to 20 mg. His LDL should be < 70 due to his risk. RADAMES Huizar.

## 2021-12-09 ENCOUNTER — OFFICE VISIT (OUTPATIENT)
Dept: SLEEP MEDICINE | Facility: MEDICAL CENTER | Age: 66
End: 2021-12-09
Payer: COMMERCIAL

## 2021-12-09 VITALS
HEIGHT: 68 IN | TEMPERATURE: 97.8 F | RESPIRATION RATE: 14 BRPM | SYSTOLIC BLOOD PRESSURE: 124 MMHG | WEIGHT: 162 LBS | HEART RATE: 62 BPM | DIASTOLIC BLOOD PRESSURE: 76 MMHG | OXYGEN SATURATION: 98 % | BODY MASS INDEX: 24.55 KG/M2

## 2021-12-09 DIAGNOSIS — R07.82 INTERCOSTAL PAIN: ICD-10-CM

## 2021-12-09 DIAGNOSIS — R93.89 ABNORMAL CHEST CT: ICD-10-CM

## 2021-12-09 DIAGNOSIS — R07.89 OTHER CHEST PAIN: ICD-10-CM

## 2021-12-09 DIAGNOSIS — J44.9 CHRONIC OBSTRUCTIVE PULMONARY DISEASE, UNSPECIFIED COPD TYPE (HCC): ICD-10-CM

## 2021-12-09 DIAGNOSIS — R06.02 SOB (SHORTNESS OF BREATH): ICD-10-CM

## 2021-12-09 PROCEDURE — 99214 OFFICE O/P EST MOD 30 MIN: CPT | Performed by: INTERNAL MEDICINE

## 2021-12-09 ASSESSMENT — FIBROSIS 4 INDEX: FIB4 SCORE: 1.24

## 2021-12-10 NOTE — PROGRESS NOTES
Chief Complaint   Patient presents with   • Follow-Up     Last seen with Dr Hernadez 6/16/20   • Shortness of Breath     Reactive Airways       HPI:  The patient is a 66-year-old man who is a former smoker of about 1 pack a day for 30 years.  He quit smoking several years ago.  He has been treated with Breo and albuterol.  He admits to chronic dyspnea.  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  PFTs that were previously done at [facility] in 2019 demonstrated an FEV1 of 2.05 L which is 71% of predicted. FEV1 FVC ratio was normal. There was 9% improvement after inhaled bronchodilator. Total lung capacity was 85% of predicted and DLCO was 138% of predicted.  About a week ago when at work he became suddenly short of breath to the point where he had difficulty speaking.  This lasted for short period of time.  It may have occurred when he was exposed to cold air.  He has noticed recently that cold air does cause him to be more short of breath.  He also complains of persistent right-sided chest discomfort in the right upper chest anteriorly. He says that the pain extends into his neck. He has complained of this pain in the past. Chest x-rays and CT scans so far have been unrevealing.    Past Medical History:   Diagnosis Date   • Arthritis    • Asthma without status asthmaticus 3/7/2020   • Bronchitis    • Carotid artery stenosis, asymptomatic, right 8/28/2020   • Cataract    • Chest pain    • Chest tightness    • Constipation    • COPD (chronic obstructive pulmonary disease) ([facility]) 3/7/2020   • Frequent urination    • Serbian measles    • Hearing difficulty    • Heartburn    • Hypertension    • Painful joint    • Shortness of breath    • Sweat, sweating, excessive    • Vision loss    • Wears glasses        ROS:   Constitutional: Denies fevers, chills, night sweats, fatigue or weight loss  Eyes: Denies vision loss, pain, drainage, double vision  Ears, Nose, Throat: Denies earache, tinnitus, hoarseness  Cardiovascular: Denies  chest pain, tightness, palpitations  Respiratory: See HPI  Sleep: Denies, snoring, apnea  GI: Denies abdominal pain, nausea, vomiting, diarrhea  : Denies frequent urination, hematuria, painful urination  Musculoskeletal: Denies back pain, painful joints, sore muscles  Neurological: Denies headaches, seizures  Skin: Denies rashes, color changes  Psychiatric: Denies depression or thoughts of suicide  Hematologic: Denies bleeding tendency or clotting tendency  Allergic/Immunologic: Denies rhinitis, skin sensitivity    Social History     Socioeconomic History   • Marital status:      Spouse name: Not on file   • Number of children: Not on file   • Years of education: Not on file   • Highest education level: Not on file   Occupational History   • Not on file   Tobacco Use   • Smoking status: Former Smoker     Packs/day: 1.00     Years: 30.00     Pack years: 30.00     Types: Cigarettes     Start date: 1980     Quit date: 2019     Years since quittin.7   • Smokeless tobacco: Never Used   Vaping Use   • Vaping Use: Never used   Substance and Sexual Activity   • Alcohol use: Yes     Comment: moderately    • Drug use: No   • Sexual activity: Not on file   Other Topics Concern   • Not on file   Social History Narrative   • Not on file     Social Determinants of Health     Financial Resource Strain:    • Difficulty of Paying Living Expenses: Not on file   Food Insecurity:    • Worried About Running Out of Food in the Last Year: Not on file   • Ran Out of Food in the Last Year: Not on file   Transportation Needs:    • Lack of Transportation (Medical): Not on file   • Lack of Transportation (Non-Medical): Not on file   Physical Activity:    • Days of Exercise per Week: Not on file   • Minutes of Exercise per Session: Not on file   Stress:    • Feeling of Stress : Not on file   Social Connections:    • Frequency of Communication with Friends and Family: Not on file   • Frequency of Social Gatherings with  "Friends and Family: Not on file   • Attends Rastafarian Services: Not on file   • Active Member of Clubs or Organizations: Not on file   • Attends Club or Organization Meetings: Not on file   • Marital Status: Not on file   Intimate Partner Violence:    • Fear of Current or Ex-Partner: Not on file   • Emotionally Abused: Not on file   • Physically Abused: Not on file   • Sexually Abused: Not on file   Housing Stability:    • Unable to Pay for Housing in the Last Year: Not on file   • Number of Places Lived in the Last Year: Not on file   • Unstable Housing in the Last Year: Not on file     Patient has no known allergies.  Current Outpatient Medications on File Prior to Visit   Medication Sig Dispense Refill   • rosuvastatin (CRESTOR) 20 MG Tab Take 1 Tablet by mouth every day. 90 Tablet 3   • tamsulosin (FLOMAX) 0.4 MG capsule      • aspirin EC (ECOTRIN) 81 MG Tablet Delayed Response Take 81 mg by mouth every day.     • amlodipine-benazepril (LOTREL) 5-20 MG per capsule Take 1 capsule by mouth every day. 30 capsule 5   • omeprazole (PRILOSEC) 40 MG delayed-release capsule      • Fluticasone Furoate-Vilanterol (BREO ELLIPTA) 200-25 MCG/INH AEROSOL POWDER, BREATH ACTIVATED Breo Ellipta 200 mcg-25 mcg/dose powder for inhalation     • albuterol 108 (90 Base) MCG/ACT Aero Soln inhalation aerosol Inhale 2 Puffs by mouth every 6 hours as needed for Shortness of Breath. 1 Each 2     No current facility-administered medications on file prior to visit.     /76 (BP Location: Right arm, Patient Position: Sitting, BP Cuff Size: Adult)   Pulse 62   Temp 36.6 °C (97.8 °F) (Temporal)   Resp 14   Ht 1.727 m (5' 8\")   Wt 73.5 kg (162 lb)   SpO2 98%   Family History   Problem Relation Age of Onset   • Heart Disease Mother    • Heart Disease Father        Physical Exam:    HEENT: PERRLA, EOMI, no scleral icterus, no nasal or oral lesions  Neck: No thyromegaly, no adenopathy, no bruits  Mallampatti: Grade II  Lungs: Equal " breath sounds, no wheezes or crackles  Chest: Complaints of pain right upper medial chest. No point tenderness. No evidence of supraclavicular or cervical adenopathy.  Heart: Regular rate and rhythm, no gallops or murmurs  Abdomen: Soft, benign, no organomegaly  Extremities: No clubbing, cyanosis, or edema  Neurologic: Cranial nerve, motor, and sensory exam are normal    1. Abnormal chest CT    2. Other chest pain    3. Intercostal pain    4. Chronic obstructive pulmonary disease, unspecified COPD type (HCC)    5. SOB (shortness of breath)        He is complaining of a bit more shortness of breath. His smoking history and PFTs are compatible with mild chronic obstructive pulmonary disease. We will try him on Trelegy to see if that is more effective than the Breo. We will also repeat a CT scan of his chest. He is concerned about the possibility of lung cancer because of his smoking history. We will see him back after the above test.

## 2021-12-27 ENCOUNTER — OFFICE VISIT (OUTPATIENT)
Dept: URGENT CARE | Facility: PHYSICIAN GROUP | Age: 66
End: 2021-12-27
Payer: COMMERCIAL

## 2021-12-27 ENCOUNTER — HOSPITAL ENCOUNTER (OUTPATIENT)
Facility: MEDICAL CENTER | Age: 66
End: 2021-12-27
Attending: NURSE PRACTITIONER
Payer: COMMERCIAL

## 2021-12-27 VITALS
HEART RATE: 85 BPM | DIASTOLIC BLOOD PRESSURE: 66 MMHG | BODY MASS INDEX: 25.09 KG/M2 | SYSTOLIC BLOOD PRESSURE: 156 MMHG | TEMPERATURE: 96.9 F | WEIGHT: 165 LBS | RESPIRATION RATE: 12 BRPM | OXYGEN SATURATION: 99 %

## 2021-12-27 DIAGNOSIS — I10 HYPERTENSION, UNSPECIFIED TYPE: ICD-10-CM

## 2021-12-27 DIAGNOSIS — J98.8 RTI (RESPIRATORY TRACT INFECTION): ICD-10-CM

## 2021-12-27 DIAGNOSIS — R52 BODY ACHES: ICD-10-CM

## 2021-12-27 LAB
EXTERNAL QUALITY CONTROL: NORMAL
FLUAV+FLUBV AG SPEC QL IA: NORMAL
INT CON NEG: NORMAL
INT CON POS: NORMAL
SARS-COV+SARS-COV-2 AG RESP QL IA.RAPID: NEGATIVE

## 2021-12-27 PROCEDURE — 87804 INFLUENZA ASSAY W/OPTIC: CPT | Performed by: NURSE PRACTITIONER

## 2021-12-27 PROCEDURE — 99213 OFFICE O/P EST LOW 20 MIN: CPT | Performed by: NURSE PRACTITIONER

## 2021-12-27 PROCEDURE — U0003 INFECTIOUS AGENT DETECTION BY NUCLEIC ACID (DNA OR RNA); SEVERE ACUTE RESPIRATORY SYNDROME CORONAVIRUS 2 (SARS-COV-2) (CORONAVIRUS DISEASE [COVID-19]), AMPLIFIED PROBE TECHNIQUE, MAKING USE OF HIGH THROUGHPUT TECHNOLOGIES AS DESCRIBED BY CMS-2020-01-R: HCPCS

## 2021-12-27 PROCEDURE — U0005 INFEC AGEN DETEC AMPLI PROBE: HCPCS

## 2021-12-27 PROCEDURE — 87426 SARSCOV CORONAVIRUS AG IA: CPT | Performed by: NURSE PRACTITIONER

## 2021-12-27 RX ORDER — PREDNISONE 5 MG/1
TABLET ORAL
COMMUNITY
Start: 2021-11-15 | End: 2022-01-05

## 2021-12-27 RX ORDER — OMEPRAZOLE 20 MG/1
CAPSULE, DELAYED RELEASE ORAL
COMMUNITY
Start: 2021-10-15 | End: 2022-02-10

## 2021-12-27 RX ORDER — TIOTROPIUM BROMIDE INHALATION SPRAY 3.12 UG/1
SPRAY, METERED RESPIRATORY (INHALATION)
COMMUNITY
End: 2022-02-15

## 2021-12-27 ASSESSMENT — ENCOUNTER SYMPTOMS
ABDOMINAL PAIN: 0
NAUSEA: 0
HEADACHES: 1
MYALGIAS: 0
COUGH: 0
FEVER: 0
VOMITING: 0
CHILLS: 1
EYE PAIN: 0
SHORTNESS OF BREATH: 0
RHINORRHEA: 1
SORE THROAT: 0
DIZZINESS: 0

## 2021-12-27 ASSESSMENT — FIBROSIS 4 INDEX: FIB4 SCORE: 1.24

## 2021-12-27 NOTE — LETTER
December 27, 2021         Patient: Adrián Sheldon   YOB: 1955   Date of Visit: 12/27/2021           To Whom it May Concern:    Adrián Sheldon was seen in my clinic on 12/27/2021. He may return to work on 12/29/21, if COVID test is negative  .    If you have any questions or concerns, please don't hesitate to call.        Sincerely,           RODRI Bautista.  Electronically Signed

## 2021-12-27 NOTE — PROGRESS NOTES
Subjective:   Adrián Sheldon is a 66 y.o. male who presents for Congestion (Started this morning )      URI   This is a new problem. The current episode started today. The problem has been unchanged. There has been no fever. Associated symptoms include congestion, headaches and rhinorrhea. Pertinent negatives include no abdominal pain, chest pain, coughing, ear pain, joint swelling, nausea, plugged ear sensation, rash, sore throat or vomiting. He has tried acetaminophen for the symptoms. The treatment provided no relief.       Review of Systems   Constitutional: Positive for chills and malaise/fatigue. Negative for fever.   HENT: Positive for congestion and rhinorrhea. Negative for ear pain and sore throat.    Eyes: Negative for pain.   Respiratory: Negative for cough and shortness of breath.    Cardiovascular: Negative for chest pain.   Gastrointestinal: Negative for abdominal pain, nausea and vomiting.   Genitourinary: Negative for hematuria.   Musculoskeletal: Negative for myalgias.   Skin: Negative for rash.   Neurological: Positive for headaches. Negative for dizziness.       Medications:    • albuterol Aers  • amlodipine-benazepril  • aspirin EC Tbec  • Breo Ellipta Aepb  • omeprazole  • predniSONE Tabs  • rosuvastatin Tabs  • Spiriva Respimat Aers  • tamsulosin  • Trelegy Ellipta Aepb    Allergies: Patient has no known allergies.    Problem List: Adrián Sheldon does not have any pertinent problems on file.    Surgical History:  No past surgical history on file.    Past Social Hx: Adrián Sheldon  reports that he quit smoking about 2 years ago. His smoking use included cigarettes. He started smoking about 41 years ago. He has a 30.00 pack-year smoking history. He has never used smokeless tobacco. He reports current alcohol use. He reports that he does not use drugs.     Past Family Hx:  Adrián Sheldon family history includes Heart Disease in his father and mother.     Problem list, medications, and  allergies reviewed by myself today in Epic.     Objective:     /66   Pulse 85   Temp 36.1 °C (96.9 °F) (Temporal)   Resp 12   Wt 74.8 kg (165 lb)   SpO2 99%   BMI 25.09 kg/m²     Physical Exam  Vitals and nursing note reviewed.   Constitutional:       General: He is not in acute distress.     Appearance: He is well-developed.   HENT:      Head: Normocephalic and atraumatic.      Right Ear: Tympanic membrane and external ear normal.      Left Ear: Tympanic membrane and external ear normal.      Nose: Nose normal.      Right Sinus: No maxillary sinus tenderness or frontal sinus tenderness.      Left Sinus: No maxillary sinus tenderness or frontal sinus tenderness.      Mouth/Throat:      Mouth: Mucous membranes are moist.      Pharynx: Uvula midline. No posterior oropharyngeal erythema.      Tonsils: No tonsillar exudate or tonsillar abscesses.   Eyes:      General:         Right eye: No discharge.         Left eye: No discharge.      Conjunctiva/sclera: Conjunctivae normal.   Cardiovascular:      Rate and Rhythm: Normal rate.   Pulmonary:      Effort: Pulmonary effort is normal. No respiratory distress.      Breath sounds: Normal breath sounds.   Abdominal:      General: There is no distension.   Musculoskeletal:         General: Normal range of motion.   Skin:     General: Skin is warm and dry.   Neurological:      General: No focal deficit present.      Mental Status: He is alert and oriented to person, place, and time. Mental status is at baseline.      Gait: Gait (gait at baseline) normal.   Psychiatric:         Judgment: Judgment normal.         Assessment/Plan:     Diagnosis and associated orders:     1. Body aches  POCT Influenza A/B    POCT SARS-COV Antigen GRZEGORZ (Symptomatic Only)    SARS-CoV-2 PCR (24 hour In-House): Collect NP swab in VTM   2. RTI (respiratory tract infection)     3. Hypertension, unspecified type          Comments/MDM:     The patient's presenting symptoms and exam findings most  likely are due to a viral etiology.     Test for COVID-19 via PCR. Result will be reviewed by myself. We will call/message back for results and appropriate further instructions. Instructed to sign up for [employer] if they have not already. Result will be automatically released to [employer] application for patient review. I will be sending a message with Next Step Instructions to [employer] soon after resulted.   Symptomatic and supportive care:   Plenty of oral hydration and rest   Over the counter cough suppressant as directed.  Tylenol or ibuprofen for pain and fever as directed.   Warm salt water gargles for sore throat, soft foods, cool liquids.   Saline nasal spray and Flonase as a decongestant.   Infection control measures at home. Stay away from people, Hand washing, covering sneeze/cough, disinfect surfaces.   Remain home from work, school, and other populated environments. Work note provided with information of quarantine measures per CDC guidelines.   Overall, the patient is well-appearing. They are not hypoxic, afebrile, and a normal pulmonary exam.                 Please note that this dictation was created using voice recognition software. I have made a reasonable attempt to correct obvious errors, but I expect that there are errors of grammar and possibly content that I did not discover before finalizing the note.    This note was electronically signed by Maury ROSE.

## 2021-12-28 DIAGNOSIS — R52 BODY ACHES: ICD-10-CM

## 2021-12-28 LAB
COVID ORDER STATUS COVID19: NORMAL
SARS-COV-2 RNA RESP QL NAA+PROBE: NOTDETECTED
SPECIMEN SOURCE: NORMAL

## 2022-01-05 ENCOUNTER — HOSPITAL ENCOUNTER (OUTPATIENT)
Facility: MEDICAL CENTER | Age: 67
End: 2022-01-05
Attending: FAMILY MEDICINE
Payer: COMMERCIAL

## 2022-01-05 ENCOUNTER — OFFICE VISIT (OUTPATIENT)
Dept: URGENT CARE | Facility: PHYSICIAN GROUP | Age: 67
End: 2022-01-05
Payer: COMMERCIAL

## 2022-01-05 VITALS
BODY MASS INDEX: 26.2 KG/M2 | TEMPERATURE: 96.7 F | HEIGHT: 66 IN | WEIGHT: 163 LBS | OXYGEN SATURATION: 98 % | HEART RATE: 88 BPM | SYSTOLIC BLOOD PRESSURE: 162 MMHG | DIASTOLIC BLOOD PRESSURE: 64 MMHG | RESPIRATION RATE: 14 BRPM

## 2022-01-05 DIAGNOSIS — J42 ACUTE EXACERBATION OF CHRONIC BRONCHITIS (HCC): ICD-10-CM

## 2022-01-05 DIAGNOSIS — J20.9 ACUTE EXACERBATION OF CHRONIC BRONCHITIS (HCC): ICD-10-CM

## 2022-01-05 DIAGNOSIS — J98.8 RTI (RESPIRATORY TRACT INFECTION): ICD-10-CM

## 2022-01-05 DIAGNOSIS — I10 PRIMARY HYPERTENSION: ICD-10-CM

## 2022-01-05 LAB
EXTERNAL QUALITY CONTROL: NORMAL
SARS-COV+SARS-COV-2 AG RESP QL IA.RAPID: NEGATIVE

## 2022-01-05 PROCEDURE — 87426 SARSCOV CORONAVIRUS AG IA: CPT | Performed by: FAMILY MEDICINE

## 2022-01-05 PROCEDURE — 0240U HCHG SARS-COV-2 COVID-19 NFCT DS RESP RNA 3 TRGT MIC: CPT

## 2022-01-05 PROCEDURE — 99214 OFFICE O/P EST MOD 30 MIN: CPT | Performed by: FAMILY MEDICINE

## 2022-01-05 RX ORDER — PREDNISONE 10 MG/1
30 TABLET ORAL EVERY MORNING
Qty: 21 TABLET | Refills: 0 | Status: SHIPPED | OUTPATIENT
Start: 2022-01-05 | End: 2022-01-12

## 2022-01-05 RX ORDER — DOXYCYCLINE HYCLATE 100 MG
100 TABLET ORAL EVERY 12 HOURS
Qty: 14 TABLET | Refills: 0 | Status: SHIPPED | OUTPATIENT
Start: 2022-01-05 | End: 2022-01-12

## 2022-01-05 ASSESSMENT — FIBROSIS 4 INDEX: FIB4 SCORE: 1.24

## 2022-01-05 NOTE — PROGRESS NOTES
Subjective     Adrián Sheldon is a 66 y.o. male who presents with Congestion (followup pt states)      - This is a pleasant and nontoxic appearing 66 y.o. male who has come to the walk-in clinic today for:    #1) 1 week w/ cough/chest congestion, stuffy/runny nose, no NVFC. Sinus has improved but still has cough and increased sputum/wheezing from baseline COPD.  No cp/sob      Seen 12/27/21 for this. Notes/labs from that visit reviewed     #2) Hx COPD and asthma, has flare up at moment. Does not need refill of his inhalers.     #3) Hx HTN, on meds. BP high today, monitor at [facility] and keep log and f/u PCP      ALLERGIES:  Patient has no known allergies.     PMH:  Past Medical History:   Diagnosis Date   • Arthritis    • Asthma without status asthmaticus 3/7/2020   • Bronchitis    • Carotid artery stenosis, asymptomatic, right 8/28/2020   • Cataract    • Chest pain    • Chest tightness    • Constipation    • COPD (chronic obstructive pulmonary disease) ([facility]) 3/7/2020   • Frequent urination    • Syriac measles    • Hearing difficulty    • Heartburn    • Hypertension    • Painful joint    • Shortness of breath    • Sweat, sweating, excessive    • Vision loss    • Wears glasses         PSH:  History reviewed. No pertinent surgical history.    MEDS:    Current Outpatient Medications:   •  doxycycline (VIBRAMYCIN) 100 MG Tab, Take 1 Tablet by mouth every 12 hours for 7 days., Disp: 14 Tablet, Rfl: 0  •  predniSONE (DELTASONE) 10 MG Tab, Take 3 Tablets by mouth every morning for 7 days., Disp: 21 Tablet, Rfl: 0  •  omeprazole (PRILOSEC) 20 MG delayed-release capsule, , Disp: , Rfl:   •  tiotropium (SPIRIVA RESPIMAT) 2.5 mcg/Act Aero Soln, 2 puff(s), Disp: , Rfl:   •  Fluticasone-Umeclidin-Vilant (TRELEGY ELLIPTA) 100-62.5-25 MCG/INH AEROSOL POWDER, BREATH ACTIVATED inhalation, Inhale 1 Inhalation every day., Disp: 45.32 Each, Rfl: 11  •  Fluticasone-Umeclidin-Vilant (TRELEGY ELLIPTA) 100-62.5-25 MCG/INH AEROSOL POWDER,  "BREATH ACTIVATED inhalation, Inhale 1 Inhalation every day., Disp: 45.32 Each, Rfl: 0  •  rosuvastatin (CRESTOR) 20 MG Tab, Take 1 Tablet by mouth every day., Disp: 90 Tablet, Rfl: 3  •  tamsulosin (FLOMAX) 0.4 MG capsule, , Disp: , Rfl:   •  aspirin EC (ECOTRIN) 81 MG Tablet Delayed Response, Take 81 mg by mouth every day., Disp: , Rfl:   •  amlodipine-benazepril (LOTREL) 5-20 MG per capsule, Take 1 capsule by mouth every day., Disp: 30 capsule, Rfl: 5  •  omeprazole (PRILOSEC) 40 MG delayed-release capsule, , Disp: , Rfl:   •  Fluticasone Furoate-Vilanterol (BREO ELLIPTA) 200-25 MCG/INH AEROSOL POWDER, BREATH ACTIVATED, Breo Ellipta 200 mcg-25 mcg/dose powder for inhalation, Disp: , Rfl:   •  albuterol 108 (90 Base) MCG/ACT Aero Soln inhalation aerosol, Inhale 2 Puffs by mouth every 6 hours as needed for Shortness of Breath., Disp: 1 Each, Rfl: 2    ** I have documented what I find to be significant in regards to past medical, social, family and surgical history  in my HPI or under PMH/PSH/FH review section, otherwise it is noncontributory **           HPI    Review of Systems   HENT: Positive for congestion.    All other systems reviewed and are negative.             Objective     BP (!) 162/64   Pulse 88   Temp 35.9 °C (96.7 °F)   Resp 14   Ht 1.676 m (5' 6\")   Wt 73.9 kg (163 lb)   SpO2 98%   BMI 26.31 kg/m²      Physical Exam  Vitals and nursing note reviewed.   Constitutional:       General: He is not in acute distress.     Appearance: Normal appearance. He is well-developed.   HENT:      Head: Normocephalic and atraumatic.      Nose: Congestion present.      Mouth/Throat:      Mouth: Mucous membranes are moist.      Pharynx: Oropharynx is clear. No posterior oropharyngeal erythema.   Eyes:      General: No scleral icterus.  Cardiovascular:      Heart sounds: Normal heart sounds. No murmur heard.      Pulmonary:      Effort: Pulmonary effort is normal. No respiratory distress.      Breath sounds: " Wheezing ( faint exp) present.   Skin:     Coloration: Skin is not jaundiced or pale.   Neurological:      Mental Status: He is alert.      Motor: No abnormal muscle tone.   Psychiatric:         Mood and Affect: Mood normal.         Behavior: Behavior normal.         Assessment & Plan       1. RTI (respiratory tract infection)  CoV-2 and Flu A/B by PCR (24 hour In-House): Collect NP swab in VTM    POCT SARS-COV Antigen GRZEGOZR (Symptomatic Only)   2. Acute exacerbation of chronic bronchitis (HCC)  doxycycline (VIBRAMYCIN) 100 MG Tab    predniSONE (DELTASONE) 10 MG Tab   3. Primary hypertension         - Dx, plan & d/c instructions discussed   - Rest, stay hydrated, OTC Motrin and/or Tylenol as needed  - E.R. precautions discussed     Asked to kindly follow up with their PCP's office in 2-3 days for a recheck, ER if not improving or feeling/getting worse.    Any realistic side effects of medications that may have been given today reviewed.     Patient left in stable condition     POCT results reviewed/discussed    Pertinent prior lab work and/or imaging studies in Epic have been reviewed by me today on day of this visit.      Pertinent prior office visit notes in [facility] have been reviewed by me today on day of this visit.

## 2022-01-05 NOTE — LETTER
January 5, 2022         Patient: Adrián Sheldon   YOB: 1955   Date of Visit: 1/5/2022           To Whom it May Concern:    Adrián Sheldon was seen in my clinic on 1/5/2022. He may return to work tomorrow.    If you have any questions or concerns, please don't hesitate to call.        Sincerely,           Jb Mims M.D.  Electronically Signed

## 2022-01-06 LAB
FLUAV RNA SPEC QL NAA+PROBE: NEGATIVE
FLUBV RNA SPEC QL NAA+PROBE: NEGATIVE
SARS-COV-2 RNA RESP QL NAA+PROBE: NOTDETECTED
SPECIMEN SOURCE: NORMAL

## 2022-02-10 ENCOUNTER — OFFICE VISIT (OUTPATIENT)
Dept: SLEEP MEDICINE | Facility: MEDICAL CENTER | Age: 67
End: 2022-02-10
Payer: COMMERCIAL

## 2022-02-10 VITALS
WEIGHT: 166 LBS | OXYGEN SATURATION: 95 % | RESPIRATION RATE: 16 BRPM | HEART RATE: 77 BPM | BODY MASS INDEX: 26.68 KG/M2 | HEIGHT: 66 IN | DIASTOLIC BLOOD PRESSURE: 78 MMHG | SYSTOLIC BLOOD PRESSURE: 124 MMHG

## 2022-02-10 DIAGNOSIS — K21.9 GASTROESOPHAGEAL REFLUX DISEASE, UNSPECIFIED WHETHER ESOPHAGITIS PRESENT: ICD-10-CM

## 2022-02-10 DIAGNOSIS — J44.9 CHRONIC OBSTRUCTIVE PULMONARY DISEASE, UNSPECIFIED COPD TYPE (HCC): ICD-10-CM

## 2022-02-10 DIAGNOSIS — J45.40 MODERATE PERSISTENT ASTHMA WITHOUT STATUS ASTHMATICUS WITHOUT COMPLICATION: ICD-10-CM

## 2022-02-10 DIAGNOSIS — Z87.891 FORMER SMOKER: ICD-10-CM

## 2022-02-10 DIAGNOSIS — Z87.891 PERSONAL HISTORY OF TOBACCO USE: ICD-10-CM

## 2022-02-10 PROCEDURE — 99214 OFFICE O/P EST MOD 30 MIN: CPT | Performed by: PHYSICIAN ASSISTANT

## 2022-02-10 ASSESSMENT — ENCOUNTER SYMPTOMS
INSOMNIA: 0
PALPITATIONS: 0
TREMORS: 0
SHORTNESS OF BREATH: 1
WHEEZING: 1
ORTHOPNEA: 0
FEVER: 0
HEARTBURN: 1
DIZZINESS: 0
SPUTUM PRODUCTION: 1
CHILLS: 0
HEADACHES: 0
SINUS PAIN: 0
SORE THROAT: 0
WEIGHT LOSS: 0
COUGH: 1

## 2022-02-10 ASSESSMENT — FIBROSIS 4 INDEX: FIB4 SCORE: 1.24

## 2022-02-10 NOTE — PROGRESS NOTES
CC: follow up chest discomfort    HPI:  Adrián Sheldon is a 66 y.o. year old male here today for follow-up on COPD.  Last seen in clinic 12/9/2021 by Dr. Lamberto Box.  He is a former smoker with reported quit date 2/25/2019 and 30-pack-year history.    He worked as a minor, previously followed by Sukhjinder pulmonary Dr. Barron at [facility].  He reports episodic chest discomfort/tightness resolving with rest.  Other pertinent history includes hypertension, right sided intercostal chondritis, TIA, intercostal pain right side/chondritis, BPH with urinary obstruction.      Reviewed in clinic vitals including /78, HR 77, oxygen saturation 95%, BMI 26.79 kg/m2.      Reviewed home medication regimen including Trelegy, omeprazole, spiriva, albuterol.  Currently using albuterol twice daily.    Reviewed most recent imaging including chest x-ray obtained 10/13/2021 demonstrating linear scarring or atelectasis left lower lobe, no acute cardiopulmonary process.    Chest CT obtained 5/11/2020 demonstrating 4 mm peripheral nodular area of scarring right upper lobe, calcified granuloma peripheral left lower lobe with small calcified mediastinal nodes consistent with prior granulomatous process, no lung mass or destructive bony process to explain patient's right chest wall pain, aortic atherosclerosis.  Follow-up chest CT was recommended but not obtained.    Pulmonary function testing previously obtained at [facility] in 2019 demonstrating an FEV1 of 2.05 L or 71% predicted, FVC of 2.92 L or 70% predicted, FEV1/FVC ratio of 70, residual volume 116% predicted, TLC 95% predicted and DLCO 138% predicted.  Per pulmonologist interpretation mild obstructive ventilatory dysfunction, no significant postbronchodilator change, normal lung volumes, diffusing capacity and airway resistance, findings consistent with asthma/COPD predominantly and chronic bronchitis type.    Review of Systems   Constitutional: Negative for chills,  fever, malaise/fatigue and weight loss.   HENT: Positive for congestion and hearing loss. Negative for nosebleeds, sinus pain, sore throat (inflammation) and tinnitus.    Respiratory: Positive for cough (due to acid ), sputum production (clear ), shortness of breath (with activity, cold air ) and wheezing.    Cardiovascular: Positive for chest pain. Negative for palpitations, orthopnea and leg swelling.   Gastrointestinal: Positive for heartburn.        Some tooth related pain, no swallowing difficulty   Neurological: Negative for dizziness, tremors and headaches.   Psychiatric/Behavioral: The patient does not have insomnia.        Past Medical History:   Diagnosis Date   • Arthritis    • Asthma without status asthmaticus 3/7/2020   • Bronchitis    • Carotid artery stenosis, asymptomatic, right 2020   • Cataract    • Chest pain    • Chest tightness    • Constipation    • COPD (chronic obstructive pulmonary disease) ([facility]) 3/7/2020   • Frequent urination    • [place] measles    • Hearing difficulty    • Heartburn    • Hypertension    • Painful joint    • Shortness of breath    • Sweat, sweating, excessive    • Vision loss    • Wears glasses        No past surgical history on file.    Family History   Problem Relation Age of Onset   • Heart Disease Mother    • Heart Disease Father        Social History     Socioeconomic History   • Marital status:      Spouse name: Not on file   • Number of children: Not on file   • Years of education: Not on file   • Highest education level: Not on file   Occupational History   • Not on file   Tobacco Use   • Smoking status: Former Smoker     Packs/day: 1.00     Years: 30.00     Pack years: 30.00     Types: Cigarettes     Start date: 1980     Quit date: 2019     Years since quittin.9   • Smokeless tobacco: Never Used   Vaping Use   • Vaping Use: Never used   Substance and Sexual Activity   • Alcohol use: Yes     Comment: moderately    • Drug use: No   • Sexual  "activity: Not on file   Other Topics Concern   • Not on file   Social History Narrative   • Not on file     Social Determinants of Health     Financial Resource Strain:    • Difficulty of Paying Living Expenses: Not on file   Food Insecurity:    • Worried About Running Out of Food in the Last Year: Not on file   • Ran Out of Food in the Last Year: Not on file   Transportation Needs:    • Lack of Transportation (Medical): Not on file   • Lack of Transportation (Non-Medical): Not on file   Physical Activity:    • Days of Exercise per Week: Not on file   • Minutes of Exercise per Session: Not on file   Stress:    • Feeling of Stress : Not on file   Social Connections:    • Frequency of Communication with Friends and Family: Not on file   • Frequency of Social Gatherings with Friends and Family: Not on file   • Attends [employer] Services: Not on file   • Active Member of Clubs or Organizations: Not on file   • Attends Club or Organization Meetings: Not on file   • Marital Status: Not on file   Intimate Partner Violence:    • Fear of Current or Ex-Partner: Not on file   • Emotionally Abused: Not on file   • Physically Abused: Not on file   • Sexually Abused: Not on file   Housing Stability:    • Unable to Pay for Housing in the Last Year: Not on file   • Number of Places Lived in the Last Year: Not on file   • Unstable Housing in the Last Year: Not on file       Allergies as of 02/10/2022   • (No Known Allergies)        @Vital signs for this encounter:  /78   Pulse 77   Resp 16   Ht 1.676 m (5' 6\")   Wt 75.3 kg (166 lb)   SpO2 95%     Current medications as of today   Current Outpatient Medications   Medication Sig Dispense Refill   • tiotropium (SPIRIVA RESPIMAT) 2.5 mcg/Act Aero Soln 2 puff(s)     • Fluticasone-Umeclidin-Vilant (TRELEGY ELLIPTA) 100-62.5-25 MCG/INH AEROSOL POWDER, BREATH ACTIVATED inhalation Inhale 1 Inhalation every day. 45.32 Each 11   • rosuvastatin (CRESTOR) 20 MG Tab Take 1 Tablet by " mouth every day. 90 Tablet 3   • tamsulosin (FLOMAX) 0.4 MG capsule      • aspirin EC (ECOTRIN) 81 MG Tablet Delayed Response Take 81 mg by mouth every day.     • amlodipine-benazepril (LOTREL) 5-20 MG per capsule Take 1 capsule by mouth every day. 30 capsule 5   • omeprazole (PRILOSEC) 40 MG delayed-release capsule      • Fluticasone Furoate-Vilanterol (BREO ELLIPTA) 200-25 MCG/INH AEROSOL POWDER, BREATH ACTIVATED Breo Ellipta 200 mcg-25 mcg/dose powder for inhalation     • albuterol 108 (90 Base) MCG/ACT Aero Soln inhalation aerosol Inhale 2 Puffs by mouth every 6 hours as needed for Shortness of Breath. 1 Each 2   • Fluticasone-Umeclidin-Vilant (TRELEGY ELLIPTA) 100-62.5-25 MCG/INH AEROSOL POWDER, BREATH ACTIVATED inhalation Inhale 1 Inhalation every day. 45.32 Each 0     No current facility-administered medications for this visit.         Physical Exam:   Gen:           Alert and oriented, No apparent distress. Mood and affect appropriate, normal interaction with provider.  Eyes:          sclere white, conjunctive moist.  Hearing:     Grossly intact.  Dentition:    poor dentition.  Oropharynx:   Tongue normal, posterior pharynx without erythema or exudate.  Neck:        Supple, trachea midline, no masses.  Respiratory Effort: No intercostal retractions or use of accessory muscles.   Lung Auscultation:      diminshed; no rales, rhonchi or wheezing.  CV:            Regular rate and rhythm. No edema. No murmurs, rubs or gallops.  Digits, Nails, Ext: No clubbing, cyanosis, petechiae, or nodes.   Skin:        No rashes, lesions or ulcers noted on exposed skin surfaces.                     Assessment:  1. Former smoker     2. Personal history of tobacco use  REFERRAL TO LUNG CANCER SCREENING PROGRAM   3. Chronic obstructive pulmonary disease, unspecified COPD type (HCC)  PULMONARY FUNCTION TESTS -Test requested: Complete Pulmonary Function Test   4. Moderate persistent asthma without status asthmaticus without  complication  IGE SERUM    PULMONARY FUNCTION TESTS -Test requested: Complete Pulmonary Function Test   5. Gastroesophageal reflux disease, unspecified whether esophagitis present  Referral to Gastroenterology       Immunizations:    Flu: 11/23/2018  Pneumovax 23: Deferred  Prevnar 13: 11/23/2018  Moderna SARS-CoV-2 vaccine: 12/9/2021, 3/23/2021, 2/23/2021    Plan:   66 y.o. year old male here today for follow-up on COPD.  Last seen in clinic 12/9/2021 by Dr. Lamberto Box.  He is a former smoker with reported quit date 2/25/2019 and 30-pack-year history.    Former smoker/personal history of nicotine dependence: Remains abstinent of tobacco.  Update CT scan.    He worked as a , previously followed by Sukhjinder pulmonary Dr. Barron at [facility].  He reports episodic chest discomfort/tightness resolving with rest.  Other pertinent history includes hypertension, right sided intercostal chondritis, TIA, intercostal pain right side/chondritis, BPH with urinary obstruction.      Reviewed in clinic vitals including /78, HR 77, oxygen saturation 95%, BMI 26.79 kg/m2.      Reviewed home medication regimen including Trelegy, omeprazole, spiriva, albuterol.  Currently using albuterol twice daily.    COPD: Symptoms stable on Trelegy.  Reports use of albuterol twice daily.  Chest discomfort likely acid reflux described as burning sensation associated with tightness and resolving by resting.  History of asthma, see below.  Update PFT and CT scan.    Moderate persistent asthma  requests consideration for asthma shots.  Obtain IgE level.     Acid reflux: Likely source of chest discomfort, not pleuritic in nature, increased after meals.  Referral [facility].    Current on Covid vaccines, return in 6 weeks.    This dictation was created using voice recognition software. The accuracy of the dictation is limited to the abilities of the software. I expect there may be some errors of grammar and possibly content.

## 2022-02-10 NOTE — PATIENT INSTRUCTIONS
1-no change from Breo on Trelegy, continue Trelegy  2-reports chest discomfort  3-history of significant acid reflux  4-on PPI (omeprazole) prescription strength  5-referral to [facility]  6-obtain IgE level, request consideration for asthma shots  7-update CT scan  8-return in 6 weeks

## 2022-02-11 ENCOUNTER — HOSPITAL ENCOUNTER (OUTPATIENT)
Dept: LAB | Facility: MEDICAL CENTER | Age: 67
End: 2022-02-11
Attending: PHYSICIAN ASSISTANT
Payer: COMMERCIAL

## 2022-02-11 DIAGNOSIS — J45.40 MODERATE PERSISTENT ASTHMA WITHOUT STATUS ASTHMATICUS WITHOUT COMPLICATION: ICD-10-CM

## 2022-02-11 PROCEDURE — 82785 ASSAY OF IGE: CPT

## 2022-02-11 PROCEDURE — 36415 COLL VENOUS BLD VENIPUNCTURE: CPT

## 2022-02-17 ENCOUNTER — TELEPHONE (OUTPATIENT)
Dept: HEMATOLOGY ONCOLOGY | Facility: MEDICAL CENTER | Age: 67
End: 2022-02-17
Payer: COMMERCIAL

## 2022-02-17 LAB — IGE SERPL-ACNC: 12 KU/L

## 2022-02-17 NOTE — TELEPHONE ENCOUNTER
Received referral to lung cancer screening program.  Chart review to assess for lung cancer screening program eligibility.   1. Age 55-77 yrs of age? Yes 66 y.o.  2. 30 pack year hx of smoking, or greater? Yes 1 [id number]= [id number] hx  3. Current smoker or if quit, has pt quit within last 15 yrs?Yes  Quit 2/25/2019  4. Any signs or symptoms of lung cancer? None noted  5. Previous history of lung cancer? None noted  6. Chest CT within past 12 mos.? None noted  Patient does meet eligibility criteria. [facility] scheduling notified to schedule the shared decision making visit.

## 2022-02-24 ENCOUNTER — NON-PROVIDER VISIT (OUTPATIENT)
Dept: SLEEP MEDICINE | Facility: MEDICAL CENTER | Age: 67
End: 2022-02-24
Attending: PHYSICIAN ASSISTANT
Payer: COMMERCIAL

## 2022-02-24 VITALS — BODY MASS INDEX: 26.66 KG/M2 | HEIGHT: 65 IN | WEIGHT: 160 LBS

## 2022-02-24 DIAGNOSIS — J45.40 MODERATE PERSISTENT ASTHMA WITHOUT STATUS ASTHMATICUS WITHOUT COMPLICATION: ICD-10-CM

## 2022-02-24 DIAGNOSIS — J44.9 CHRONIC OBSTRUCTIVE PULMONARY DISEASE, UNSPECIFIED COPD TYPE (HCC): ICD-10-CM

## 2022-02-24 PROCEDURE — 94060 EVALUATION OF WHEEZING: CPT | Performed by: INTERNAL MEDICINE

## 2022-02-24 PROCEDURE — 94729 DIFFUSING CAPACITY: CPT | Performed by: INTERNAL MEDICINE

## 2022-02-24 PROCEDURE — 94726 PLETHYSMOGRAPHY LUNG VOLUMES: CPT | Performed by: INTERNAL MEDICINE

## 2022-02-24 ASSESSMENT — PULMONARY FUNCTION TESTS
FEV1_LLN: 2.33
FVC: 2.82
FVC_PREDICTED: 3.62
FEV1/FVC_PERCENT_PREDICTED: 106
FEV1: 2.27
FEV1_PERCENT_PREDICTED: 81
FVC_PERCENT_PREDICTED: 76
FEV1/FVC: 82
FEV1/FVC_PERCENT_PREDICTED: 105
FEV1_PERCENT_PREDICTED: 80
FEV1_PERCENT_CHANGE: -2
FEV1/FVC: 82.18
FEV1_PREDICTED: 2.79
FEV1: 2.26
FEV1/FVC_PERCENT_CHANGE: 2
FEV1/FVC: 80
FEV1/FVC_PERCENT_PREDICTED: 105
FEV1/FVC_PERCENT_LLN: 64
FEV1/FVC_PREDICTED: 77
FEV1_PERCENT_CHANGE: 0
FEV1/FVC_PERCENT_CHANGE: 0
FEV1/FVC: 80
FVC_LLN: 3.02
FVC_PERCENT_PREDICTED: 77
FEV1/FVC_PERCENT_PREDICTED: 104
FVC: 2.75
FEV1/FVC_PERCENT_PREDICTED: 77

## 2022-02-24 ASSESSMENT — FIBROSIS 4 INDEX: FIB4 SCORE: 1.24

## 2022-02-24 NOTE — PROCEDURES
Technician: Maria De Jesus Brock RRT, CPFT  Good patient effort & cooperation. Not long after test started patient started to have tighness in the chest then up to his throat this happens also at home with certain activity.   The results of this test meet the ATS/ERS standards for acceptability & reproducibility.  Test was performed on the [employer] Body Plethysmograph-Elite DX system.  Predicted equations for Spirometry are GLI-2012, ITS for lung volumes, and GLI-2017 for DLCO.  The DLCO was uncorrected for Hgb.  A bronchodilator of Ventolin HFA -2puffs via spacer administered.  DLCO performed during dilation period.    Interpretation:  1. There is a reduced peak expiratory flow on spirometry, consistent with reported diagnosis of asthma. There is a significant bronchodilator response in the peak expiratory flow, improving from 70% to 98% predicted after bronchodilator administration.  2. Lung volumes are normal.  3. Diffusion capacity is preserved.  4. Flow volume loop consistent with above interpretation.   5. Compared to prior testing at [facility] in 2019, the FEV1 has improved from 2.05 to 2.27 liters.  __________  Clint Motley MD  Pulmonary and Critical Care Medicine  [facility]

## 2022-03-01 ENCOUNTER — APPOINTMENT (OUTPATIENT)
Dept: HEMATOLOGY ONCOLOGY | Facility: MEDICAL CENTER | Age: 67
End: 2022-03-01
Payer: COMMERCIAL

## 2022-03-02 ENCOUNTER — OFFICE VISIT (OUTPATIENT)
Dept: HEMATOLOGY ONCOLOGY | Facility: MEDICAL CENTER | Age: 67
End: 2022-03-02
Payer: COMMERCIAL

## 2022-03-02 VITALS
DIASTOLIC BLOOD PRESSURE: 64 MMHG | OXYGEN SATURATION: 96 % | RESPIRATION RATE: 18 BRPM | SYSTOLIC BLOOD PRESSURE: 156 MMHG | HEIGHT: 65 IN | TEMPERATURE: 98.6 F | HEART RATE: 56 BPM | WEIGHT: 158.95 LBS | BODY MASS INDEX: 26.48 KG/M2

## 2022-03-02 DIAGNOSIS — Z87.891 PERSONAL HISTORY OF NICOTINE DEPENDENCE: ICD-10-CM

## 2022-03-02 ASSESSMENT — PAIN SCALES - GENERAL: PAINLEVEL: NO PAIN

## 2022-03-02 ASSESSMENT — ENCOUNTER SYMPTOMS
COUGH: 0
WEIGHT LOSS: 0
HEMOPTYSIS: 0
WHEEZING: 1
SPUTUM PRODUCTION: 0
SHORTNESS OF BREATH: 1

## 2022-03-02 ASSESSMENT — FIBROSIS 4 INDEX: FIB4 SCORE: 1.24

## 2022-03-02 NOTE — PROGRESS NOTES
Subjective     Adrián Sheldon is a 66 y.o. male who presents with Lung Cancer Screening Program Prescreen (HX of smoking) for lung cancer screening shared decision making visit.         HPI    Patient seen today for initial lung cancer screening visit. Patient referred by his pulmonologist, Wendi Galindo PA-C. PCP is Beatrice Ireland PA-C     The patient meets eligibility criteria including age, smoking history (30+ pack years), if former smoker, quit in the last 15 years, and absence of signs or symptoms of lung cancer.    - Age - 66  - Smoking history - Patient has smoked for 40 years at an average of 1 ppd = 40 pack year smoking history.  - Current smoking status - Former smoker - quit 2019 - 3 years ago  - No symptoms of lung cancer and no previous history of lung cancer       No Known Allergies  Current Outpatient Medications on File Prior to Visit   Medication Sig Dispense Refill   • Fluticasone-Umeclidin-Vilant (TRELEGY ELLIPTA) 100-62.5-25 MCG/INH AEROSOL POWDER, BREATH ACTIVATED inhalation Inhale 1 Inhalation every day. 45.32 Each 11   • rosuvastatin (CRESTOR) 20 MG Tab Take 1 Tablet by mouth every day. 90 Tablet 3   • tamsulosin (FLOMAX) 0.4 MG capsule      • aspirin EC (ECOTRIN) 81 MG Tablet Delayed Response Take 81 mg by mouth every day.     • amlodipine-benazepril (LOTREL) 5-20 MG per capsule Take 1 capsule by mouth every day. 30 capsule 5   • omeprazole (PRILOSEC) 40 MG delayed-release capsule      • albuterol 108 (90 Base) MCG/ACT Aero Soln inhalation aerosol Inhale 2 Puffs by mouth every 6 hours as needed for Shortness of Breath. 1 Each 2     No current facility-administered medications on file prior to visit.         Review of Systems   Constitutional: Negative for malaise/fatigue and weight loss.   Respiratory: Positive for shortness of breath (with activity at times - per patient he has asthma and COPD) and wheezing (very little and intermittent). Negative for cough (only at times from acid  "reflux), hemoptysis and sputum production.               Objective     /64   Pulse (!) 56   Temp 37 °C (98.6 °F) (Temporal)   Resp 18   Ht 1.651 m (5' 5\")   Wt 72.1 kg (158 lb 15.2 oz)   SpO2 96%   BMI 26.45 kg/m²      Physical Exam  Vitals reviewed.   Constitutional:       General: He is not in acute distress.     Appearance: Normal appearance. He is well-developed. He is not diaphoretic.   HENT:      Head: Normocephalic and atraumatic.   Cardiovascular:      Rate and Rhythm: Regular rhythm. Bradycardia present.      Heart sounds: Normal heart sounds. No murmur heard.    No friction rub. No gallop.   Pulmonary:      Effort: Pulmonary effort is normal. No respiratory distress.      Breath sounds: Normal breath sounds. No wheezing.   Musculoskeletal:         General: Normal range of motion.   Skin:     General: Skin is warm and dry.   Neurological:      Mental Status: He is alert and oriented to person, place, and time.                 Assessment & Plan        1. Personal history of nicotine dependence  CT-LUNG CANCER-SCREENING         We conducted a shared decision-making process using a decision aid. We reviewed benefits and harms of screening, including false positives and potential need for additional diagnostic testing, the possibility of over diagnosis, and total radiation exposure.    We discussed the importance of adhering to annual LDCT screening. We also discussed the impact of comorbities on the patient's the ability or willingness to undergo diagnostic procedure(s) and treatment.    Counseling on the importance of maintaining cigarette smoking abstinence if former smoker; or the importance of smoking cessation if current smoker and, if appropriate, furnishing of information about tobacco cessation interventions.    Based on our discussion, we have decided to begin annual lung cancer screening starting now.              "

## 2022-03-28 ENCOUNTER — OFFICE VISIT (OUTPATIENT)
Dept: SLEEP MEDICINE | Facility: MEDICAL CENTER | Age: 67
End: 2022-03-28
Payer: COMMERCIAL

## 2022-03-28 ENCOUNTER — HOSPITAL ENCOUNTER (OUTPATIENT)
Dept: LAB | Facility: MEDICAL CENTER | Age: 67
End: 2022-03-28
Payer: COMMERCIAL

## 2022-03-28 ENCOUNTER — APPOINTMENT (OUTPATIENT)
Dept: RADIOLOGY | Facility: MEDICAL CENTER | Age: 67
End: 2022-03-28
Attending: NURSE PRACTITIONER
Payer: MEDICARE

## 2022-03-28 VITALS
HEART RATE: 58 BPM | HEIGHT: 65 IN | DIASTOLIC BLOOD PRESSURE: 66 MMHG | WEIGHT: 154 LBS | RESPIRATION RATE: 16 BRPM | OXYGEN SATURATION: 98 % | BODY MASS INDEX: 25.66 KG/M2 | SYSTOLIC BLOOD PRESSURE: 128 MMHG

## 2022-03-28 DIAGNOSIS — Z57.9 OCCUPATIONAL EXPOSURE IN WORKPLACE: ICD-10-CM

## 2022-03-28 DIAGNOSIS — K21.9 GASTROESOPHAGEAL REFLUX DISEASE, UNSPECIFIED WHETHER ESOPHAGITIS PRESENT: ICD-10-CM

## 2022-03-28 DIAGNOSIS — Z87.891 FORMER SMOKER: ICD-10-CM

## 2022-03-28 DIAGNOSIS — J45.20 MILD INTERMITTENT REACTIVE AIRWAY DISEASE WITHOUT COMPLICATION: ICD-10-CM

## 2022-03-28 LAB
ALBUMIN SERPL BCP-MCNC: 4.5 G/DL (ref 3.2–4.9)
ALBUMIN/GLOB SERPL: 2.8 G/DL
ALP SERPL-CCNC: 69 U/L (ref 30–99)
ALT SERPL-CCNC: 20 U/L (ref 2–50)
AMYLASE SERPL-CCNC: 45 U/L (ref 20–103)
ANION GAP SERPL CALC-SCNC: 11 MMOL/L (ref 7–16)
AST SERPL-CCNC: 21 U/L (ref 12–45)
BILIRUB SERPL-MCNC: 0.4 MG/DL (ref 0.1–1.5)
BUN SERPL-MCNC: 18 MG/DL (ref 8–22)
CALCIUM SERPL-MCNC: 9 MG/DL (ref 8.5–10.5)
CHLORIDE SERPL-SCNC: 106 MMOL/L (ref 96–112)
CO2 SERPL-SCNC: 24 MMOL/L (ref 20–33)
CREAT SERPL-MCNC: 1.04 MG/DL (ref 0.5–1.4)
GFR SERPLBLD CREATININE-BSD FMLA CKD-EPI: 78 ML/MIN/1.73 M 2
GLOBULIN SER CALC-MCNC: 1.6 G/DL (ref 1.9–3.5)
GLUCOSE SERPL-MCNC: 120 MG/DL (ref 65–99)
LIPASE SERPL-CCNC: 24 U/L (ref 11–82)
POTASSIUM SERPL-SCNC: 4.7 MMOL/L (ref 3.6–5.5)
PROT SERPL-MCNC: 6.1 G/DL (ref 6–8.2)
SODIUM SERPL-SCNC: 141 MMOL/L (ref 135–145)

## 2022-03-28 PROCEDURE — 99214 OFFICE O/P EST MOD 30 MIN: CPT | Performed by: INTERNAL MEDICINE

## 2022-03-28 PROCEDURE — 36415 COLL VENOUS BLD VENIPUNCTURE: CPT

## 2022-03-28 PROCEDURE — 82150 ASSAY OF AMYLASE: CPT

## 2022-03-28 PROCEDURE — 80053 COMPREHEN METABOLIC PANEL: CPT

## 2022-03-28 PROCEDURE — 83690 ASSAY OF LIPASE: CPT

## 2022-03-28 RX ORDER — ESOMEPRAZOLE MAGNESIUM 40 MG/1
CAPSULE, DELAYED RELEASE ORAL
COMMUNITY
Start: 2022-03-21 | End: 2023-01-13

## 2022-03-28 SDOH — HEALTH STABILITY - PHYSICAL HEALTH: OCCUPATIONAL EXPOSURE TO UNSPECIFIED RISK FACTOR: Z57.9

## 2022-03-28 ASSESSMENT — ENCOUNTER SYMPTOMS
PHOTOPHOBIA: 0
FEVER: 0
FALLS: 0
TREMORS: 0
HEMOPTYSIS: 0
VOMITING: 0
NAUSEA: 0
DIZZINESS: 0
WEAKNESS: 0
SHORTNESS OF BREATH: 0
EYE PAIN: 0
PND: 0
BACK PAIN: 0
FOCAL WEAKNESS: 0
STRIDOR: 0
EYE REDNESS: 0
NECK PAIN: 0
PALPITATIONS: 0
SINUS PAIN: 0
MYALGIAS: 0
DEPRESSION: 0
CLAUDICATION: 0
SPEECH CHANGE: 0
CHILLS: 0
ABDOMINAL PAIN: 0
DOUBLE VISION: 0
HEADACHES: 0
ORTHOPNEA: 0
SORE THROAT: 0
SPUTUM PRODUCTION: 0
DIARRHEA: 0
HEARTBURN: 0
CONSTIPATION: 0
BLURRED VISION: 0
COUGH: 0
WHEEZING: 0
WEIGHT LOSS: 0
DIAPHORESIS: 0
EYE DISCHARGE: 0

## 2022-03-28 ASSESSMENT — FIBROSIS 4 INDEX: FIB4 SCORE: 1.25

## 2022-03-28 NOTE — PROGRESS NOTES
Chief Complaint   Patient presents with   • COPD     Last seen 2/10/22 SHERI Galindo PA-C    • Results     Pft 2/24/22         HPI: This patient is a 67 y.o. male whom is followed in our clinic for asthma vs. COPD last seen by Wendi KAUFMAN, on 2/10/22. Pt is a former smoker with estimated 40 pk year hx, quit in 2/2019. 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 Current position has eliminated this. Apparently he was seen for SOB in [place] by pulmonary and started on bronchodilators initially with albuterol and spiriva. This has been adjusted to trelegy but pt denies ever being tx for exacerbation or bronchitis with steroids. He actually denies SOB, cough or wheezing. His main complaint was CP which was thought to be GI in origin and has seen GI since his last visit with us with improved sxs following adjustment to PPI. He is planning to undergo EGD. PFTs from 2/2022 showed low normal FEV1 at 81% pred and FVC of 77% pred, normal ratio, no BD response, borderline air trapping, normal TLC and normal DLCO. He as referred to the lung Ca screening program and is pending CT with us but last CT done in 5/2020 showed 4mm nodular area in RUL and e/o prior granulomatous disease.     Past Medical History:   Diagnosis Date   • Arthritis    • Asthma without status asthmaticus 3/7/2020   • Bronchitis    • Carotid artery stenosis, asymptomatic, right 8/28/2020   • Cataract    • Chest pain    • Chest tightness    • Constipation    • COPD (chronic obstructive pulmonary disease) ([facility]) 3/7/2020   • Frequent urination    • Lebanese measles    • Hearing difficulty    • Heartburn    • Hypertension    • Painful joint    • Shortness of breath    • Sweat, sweating, excessive    • Vision loss    • Wears glasses        Social History     Socioeconomic History   • Marital status:      Spouse name: Not on file   • Number of children: Not on file   • Years of education: Not on file   •  Highest education level: Not on file   Occupational History   • Not on file   Tobacco Use   • Smoking status: Former Smoker     Packs/day: 1.00     Years: 40.00     Pack years: 40.00     Types: Cigarettes     Start date: 1/23/1980     Quit date: 2/25/2019     Years since quitting: 3.0   • Smokeless tobacco: Never Used   Vaping Use   • Vaping Use: Never used   Substance and Sexual Activity   • Alcohol use: Yes     Comment: moderately    • Drug use: No   • Sexual activity: Not on file   Other Topics Concern   • Not on file   Social History Narrative   • Not on file     Social Determinants of Health     Financial Resource Strain: Not on file   Food Insecurity: Not on file   Transportation Needs: Not on file   Physical Activity: Not on file   Stress: Not on file   Social Connections: Not on file   Intimate Partner Violence: Not on file   Housing Stability: Not on file       Family History   Problem Relation Age of Onset   • Heart Disease Mother    • Heart Disease Father        Current Outpatient Medications on File Prior to Visit   Medication Sig Dispense Refill   • esomeprazole (NEXIUM) 40 MG delayed-release capsule      • rosuvastatin (CRESTOR) 20 MG Tab Take 1 Tablet by mouth every day. 90 Tablet 3   • tamsulosin (FLOMAX) 0.4 MG capsule      • aspirin EC (ECOTRIN) 81 MG Tablet Delayed Response Take 81 mg by mouth every day.     • amlodipine-benazepril (LOTREL) 5-20 MG per capsule Take 1 capsule by mouth every day. 30 capsule 5   • albuterol 108 (90 Base) MCG/ACT Aero Soln inhalation aerosol Inhale 2 Puffs by mouth every 6 hours as needed for Shortness of Breath. 1 Each 2     No current facility-administered medications on file prior to visit.       Patient has no known allergies.      ROS:   Review of Systems   Constitutional: Negative for chills, diaphoresis, fever, malaise/fatigue and weight loss.   HENT: Negative for congestion, ear discharge, ear pain, hearing loss, nosebleeds, sinus pain, sore throat and  "tinnitus.    Eyes: Negative for blurred vision, double vision, photophobia, pain, discharge and redness.   Respiratory: Negative for cough, hemoptysis, sputum production, shortness of breath, wheezing and stridor.    Cardiovascular: Positive for chest pain. Negative for palpitations, orthopnea, claudication, leg swelling and PND.   Gastrointestinal: Negative for abdominal pain, constipation, diarrhea, heartburn, nausea and vomiting.   Genitourinary: Negative for dysuria and urgency.   Musculoskeletal: Negative for back pain, falls, joint pain, myalgias and neck pain.   Skin: Negative for itching and rash.   Neurological: Negative for dizziness, tremors, speech change, focal weakness, weakness and headaches.   Endo/Heme/Allergies: Negative for environmental allergies.   Psychiatric/Behavioral: Negative for depression.       /66 (BP Location: Right arm, Patient Position: Sitting, BP Cuff Size: Adult)   Pulse (!) 58   Resp 16   Ht 1.651 m (5' 5\")   Wt 69.9 kg (154 lb)   SpO2 98%   Physical Exam  Vitals reviewed.   Constitutional:       General: He is not in acute distress.     Appearance: Normal appearance. He is normal weight.   HENT:      Head: Normocephalic and atraumatic.      Right Ear: External ear normal.      Left Ear: External ear normal.      Nose: Nose normal. No congestion.      Mouth/Throat:      Mouth: Mucous membranes are moist.      Pharynx: Oropharynx is clear. No oropharyngeal exudate.   Eyes:      General: No scleral icterus.     Extraocular Movements: Extraocular movements intact.      Conjunctiva/sclera: Conjunctivae normal.      Pupils: Pupils are equal, round, and reactive to light.   Cardiovascular:      Rate and Rhythm: Normal rate and regular rhythm.      Heart sounds: Normal heart sounds. No murmur heard.    No gallop.   Pulmonary:      Effort: Pulmonary effort is normal. No respiratory distress.      Breath sounds: Normal breath sounds. No wheezing or rales.   Abdominal:      " General: There is no distension.      Palpations: Abdomen is soft.   Musculoskeletal:         General: Normal range of motion.      Cervical back: Normal range of motion and neck supple.      Right lower leg: No edema.      Left lower leg: No edema.   Skin:     General: Skin is warm and dry.      Findings: No rash.   Neurological:      Mental Status: He is alert and oriented to person, place, and time.      Cranial Nerves: No cranial nerve deficit.   Psychiatric:         Mood and Affect: Mood normal.         Behavior: Behavior normal.         PFTs as reviewed by me personally: as per hPI    Imaging as reviewed by me personally:  As per HPI    Assessment:  1. Mild intermittent reactive airway disease without complication  Fluticasone Furoate-Vilanterol (BREO ELLIPTA) 100-25 MCG/INH AEROSOL POWDER, BREATH ACTIVATED   2. Former smoker     3. Gastroesophageal reflux disease, unspecified whether esophagitis present     4. Occupational exposure in workplace         Plan:  1. This pt does not appear to have COPD. I see no emphysema on CT and no obstruction on PFTs. I am not sure trelegy is indicated but would like to decrease to Breo 100 and we can titrate this based on sxs.   2. Tobacco free; f/u CT with lung ca screening progream  3. Chronic and sxs are improved with adjustment in PPI; follow up Gi FOR egd  4. Pt did have occupational exposure which may have been contributing to or even causing sxs in the past more s/o occupational asthma. Breo as per above and if sxs continue to be controlled can consider tapering off controller therapy  Return in about 3 months (around 6/28/2022) for Wendi Galindo in 3-6 mos.

## 2022-04-12 ENCOUNTER — HOSPITAL ENCOUNTER (OUTPATIENT)
Dept: RADIOLOGY | Facility: MEDICAL CENTER | Age: 67
End: 2022-04-12
Attending: NURSE PRACTITIONER
Payer: COMMERCIAL

## 2022-04-12 ENCOUNTER — TELEPHONE (OUTPATIENT)
Dept: HEMATOLOGY ONCOLOGY | Facility: MEDICAL CENTER | Age: 67
End: 2022-04-12
Payer: COMMERCIAL

## 2022-04-12 DIAGNOSIS — Z87.891 PERSONAL HISTORY OF NICOTINE DEPENDENCE: ICD-10-CM

## 2022-04-12 PROCEDURE — 71271 CT THORAX LUNG CANCER SCR C-: CPT

## 2022-04-12 NOTE — LETTER
. [address]  [place] [address]  P [phone]  F [phone]         Date: April 14, 2022    Adrián Sheldon   [address]  [place] [address]    Re:  Low-dose chest CT performed on 04/12/2022     Medical Record Number: [id number]    Dear Adrián,    We are writing to let you know that the results of your recent low-dose chest CT (LDCT) examination shows one or more lung nodule(s) which are likely benign (not cancer).  Lung nodules are very common and many people without cancer have these nodules.  To make sure these nodule(s) are benign, and remain unchanged, your radiologist recommends you have another low-dose chest CT on or around 4/12/2023. In the event that any additional “incidental” findings were identified from this exam, we have communicated back to your primary care provider for follow-up.    Here are some other important points you should know:  • Your low-dose Chest CT report has been sent to your referring or primary health care provider and is available to participants in [employer].  As a part of our Lung Cancer Screening program we will remind you and your referring health care provider when your next LDCT screening is due.  • Although low-dose chest CT is very effective at finding lung cancer early, it cannot find all lung cancers. If you develop any new symptoms such as shortness of breath, chest pain, or coughing up blood, please call your doctor.  • Please keep in mind that good health involves quitting smoking (for help, call [facility] Quit Tobacco program at [phone]), an annual physical exam, and continued screening with low-dose chest CT.    Thank you for participating in the Lung Cancer Screening program. If you have any questions about this letter or our program, please call our Nurse at [phone].    Sincerely,  Jihan Xiao MD, [facility]  Medical Director [facility] Lung Cancer Screening Program

## 2022-04-12 NOTE — TELEPHONE ENCOUNTER
Patient recently completed his lung cancer screening CT on 4/12/2022.  There are several tiny calcified mediastinal right hilar lymph nodes consistent with old granulomatous disease.  There is also a 4 mm groundglass density subpleural nodule in the right upper lobe lateral periphery which is not changed from previous exam completed back in May 2020.  This is a lung RADS 2 and recommend patient continue with annual screening.  Patient to follow-up with his PCP and/or pulmonologist for continued annual lung cancer screening.    Incidental finding did note atherosclerotic vascular calcification as well as coronary calcification.  Will defer to patient's PCP for continued management and monitoring of this.    Personally spoke with patient on the phone today to review results.  He was very pleased with the results.    CT-LUNG CANCER-SCREENING    Result Date: 4/12/2022 4/12/2022 2:04 PM HISTORY/REASON FOR EXAM:  Lung cancer screening.; Lung cancer screening. Please cc Wendi Galindo PA-C. Former smoker. Quit 2 years ago. 40 pack-year history. Exposure to secondhand smoke. TECHNIQUE/EXAM DESCRIPTION AND NUMBER OF VIEWS: Lung cancer screening without contrast. Low dose noncontrast helical images were obtained of the chest from the lung apices through the costophrenic sulci utilizing thin collimation and intervals with reconstructed images sent to PACS in axial, coronal and sagittal planes. Low dose optimization technique was utilized for this CT exam including automated exposure control and adjustment of the mA and/or kV according to patient size. COMPARISON: CT of the chest 5/11/2020 FINDINGS: The bony thorax and soft tissues are unremarkable. The thoracic inlet structures including the thyroid are unremarkable. There is no axillary or supraclavicular adenopathy. There is atherosclerotic calcification at the aortic arch and in the left subclavian artery. Coronary calcifications are noted. Heart size is normal. There is  no pericardial effusion. There is no mediastinal or hilar mass, however, there are a few tiny calcified lymph nodes in the mediastinum in the azygos region and at the right hilum consistent with old granulomatous disease. Central tracheal-bronchial airways are unremarkable. 4 mm semisolid density nodule in the right upper lobe lateral periphery (axial image 37, series 3). No change from prior exam (image 35, series 3 from 5/11/2020). The lung fields are otherwise clear. There are no acute infiltrates, mass lesions, or suspicious pulmonary nodules. There is no effusion or pneumothorax. The upper abdominal structures in the field of view are unremarkable.     1.  Atherosclerotic vascular calcification. 2.  Coronary calcification. 3.  Several tiny calcified mediastinal and right hilar lymph nodes consistent with old granulomatous disease. 4.  4 mm groundglass density subpleural nodule in the right upper lobe lateral periphery. No change from prior exam. Lung RADS: 2 - Benign Appearance or Behavior Nodules with a very low likelihood of becoming a clinically active cancer due to size or lack of growth Findings: solid nodule(s): less than 6 mm or new less than 4 mm part solid nodule(s): less than 6 mm total diameter on baseline screening non solid nodule(s) (GGN): less than 30 mm OR greater than or equal to 30 mm and unchanged or slowly growing category 3 or 4 nodules unchanged for greater than or equal to 3 months perifissural nodule(s) less than 10 mm Management: Continue annual screening with LDCT in 12 months

## 2022-04-13 ENCOUNTER — OFFICE VISIT (OUTPATIENT)
Dept: VASCULAR LAB | Facility: MEDICAL CENTER | Age: 67
End: 2022-04-13
Attending: NURSE PRACTITIONER
Payer: COMMERCIAL

## 2022-04-13 VITALS
HEIGHT: 68 IN | WEIGHT: 156 LBS | DIASTOLIC BLOOD PRESSURE: 58 MMHG | HEART RATE: 60 BPM | BODY MASS INDEX: 23.64 KG/M2 | SYSTOLIC BLOOD PRESSURE: 118 MMHG

## 2022-04-13 DIAGNOSIS — Z87.891 FORMER SMOKER: ICD-10-CM

## 2022-04-13 DIAGNOSIS — I10 PRIMARY HYPERTENSION: ICD-10-CM

## 2022-04-13 DIAGNOSIS — E78.49 OTHER HYPERLIPIDEMIA: ICD-10-CM

## 2022-04-13 DIAGNOSIS — I65.21 CAROTID ARTERY STENOSIS, ASYMPTOMATIC, RIGHT: ICD-10-CM

## 2022-04-13 DIAGNOSIS — Z79.02 LONG TERM CURRENT USE OF ANTITHROMBOTICS/ANTIPLATELETS: ICD-10-CM

## 2022-04-13 DIAGNOSIS — Z86.73 HISTORY OF TIA (TRANSIENT ISCHEMIC ATTACK): ICD-10-CM

## 2022-04-13 PROCEDURE — 99213 OFFICE O/P EST LOW 20 MIN: CPT | Performed by: NURSE PRACTITIONER

## 2022-04-13 PROCEDURE — 99212 OFFICE O/P EST SF 10 MIN: CPT

## 2022-04-13 RX ORDER — POLYETHYLENE GLYCOL 3350, SODIUM CHLORIDE, SODIUM BICARBONATE, POTASSIUM CHLORIDE 420; 11.2; 5.72; 1.48 G/4L; G/4L; G/4L; G/4L
POWDER, FOR SOLUTION ORAL
COMMUNITY
Start: 2022-03-21 | End: 2022-08-07

## 2022-04-13 ASSESSMENT — ENCOUNTER SYMPTOMS
CLAUDICATION: 0
CHILLS: 0
HEMOPTYSIS: 0
SPUTUM PRODUCTION: 0
PND: 0
FEVER: 0
SHORTNESS OF BREATH: 0
ORTHOPNEA: 0
WHEEZING: 0
PALPITATIONS: 0
COUGH: 0

## 2022-04-13 ASSESSMENT — FIBROSIS 4 INDEX: FIB4 SCORE: 1.63

## 2022-04-13 NOTE — PROGRESS NOTES
FOLLOW UP VASCULAR VISIT  Subjective:   Adrián Sheldon is a 67 y.o. y.o. male  who presents today 04/13/2022 for   No chief complaint on file.    initially referred by Beatrice Ireland P.Ac for eval and med mgmt of carotid artery disease     HPI:    Carotid artery disease:   No curent sx.       HTN:  Current HTN concerns: Denies   Current ADRs: No  HTN sx:  No current blurred or changed vision, chest pain, shortness of breath, headache, nausea, dizziness/vertigo   Home BP log: not checking but good in offices  24h ABPM completed: not tested  Adherence to current HTN meds: compliant all of the time    Hyperlipidemia:    Tolerating rosuvastatin   Current treatment: none   Myalgias? No  Other adverse drug reactions? No  Last lipid profile: None, needs updated labs now     Antiplatelet/anticoagulation:   Yes, Details: ASA 81mg daily , no bleeding noted                      No past surgical history on file.   Current Outpatient Medications on File Prior to Visit   Medication Sig Dispense Refill   • esomeprazole (NEXIUM) 40 MG delayed-release capsule      • Fluticasone Furoate-Vilanterol (BREO ELLIPTA) 100-25 MCG/INH AEROSOL POWDER, BREATH ACTIVATED Inhale 1 Puff every day. Rinse mouth after use. 1 Each 0   • rosuvastatin (CRESTOR) 20 MG Tab Take 1 Tablet by mouth every day. 90 Tablet 3   • tamsulosin (FLOMAX) 0.4 MG capsule      • aspirin EC (ECOTRIN) 81 MG Tablet Delayed Response Take 81 mg by mouth every day.     • amlodipine-benazepril (LOTREL) 5-20 MG per capsule Take 1 capsule by mouth every day. 30 capsule 5   • albuterol 108 (90 Base) MCG/ACT Aero Soln inhalation aerosol Inhale 2 Puffs by mouth every 6 hours as needed for Shortness of Breath. 1 Each 2     No current facility-administered medications on file prior to visit.     No Known Allergies     Social History     Tobacco Use   • Smoking status: Former Smoker     Packs/day: 1.00     Years: 40.00     Pack years: 40.00     Types: Cigarettes     Start  date: 1/23/1980     Quit date: 2/25/2019     Years since quitting: 3.1   • Smokeless tobacco: Never Used   Vaping Use   • Vaping Use: Never used   Substance Use Topics   • Alcohol use: Yes     Comment: moderately    • Drug use: No     DIET AND EXERCISE:  Weight Change:stable   BMI Readings from Last 5 Encounters:   03/28/22 25.63 kg/m²   03/02/22 26.45 kg/m²   02/24/22 26.63 kg/m²   02/10/22 26.79 kg/m²   01/05/22 26.31 kg/m²      Diet: common adult  Exercise: no regular exercise program   Review of Systems   Constitutional: Negative for chills, fever and malaise/fatigue.   Respiratory: Negative for cough, hemoptysis, sputum production, shortness of breath and wheezing.    Cardiovascular: Negative for chest pain, palpitations, orthopnea, claudication, leg swelling and PND.      Objective:     There were no vitals filed for this visit.   BP Readings from Last 5 Encounters:   03/28/22 128/66   03/02/22 156/64   02/10/22 124/78   01/05/22 (!) 162/64   12/27/21 156/66      There is no height or weight on file to calculate BMI.  Physical Exam  Vitals reviewed.   Constitutional:       Appearance: Normal appearance.   HENT:      Head: Normocephalic and atraumatic.      Nose: Nose normal.      Mouth/Throat:      Mouth: Mucous membranes are moist.      Pharynx: Oropharynx is clear.   Eyes:      Extraocular Movements: Extraocular movements intact.      Conjunctiva/sclera: Conjunctivae normal.   Cardiovascular:      Rate and Rhythm: Normal rate and regular rhythm.      Pulses: Normal pulses.           Carotid pulses are 2+ on the right side and 2+ on the left side.       Radial pulses are 2+ on the right side and 2+ on the left side.        Dorsalis pedis pulses are 2+ on the right side and 2+ on the left side.        Posterior tibial pulses are 2+ on the right side and 2+ on the left side.      Heart sounds: Normal heart sounds.      Comments:    Spider telangectasia:       RLE:  None      LLE: none   Varicosities:            RLE: none      LLE: none   Corona phlebectatica:      RLE:  None        LLE:  None   Cording:         RLE:  None     LLE: None     Pulmonary:      Effort: Pulmonary effort is normal.      Breath sounds: Normal breath sounds.   Abdominal:      General: Abdomen is flat. Bowel sounds are normal.      Palpations: Abdomen is soft.   Musculoskeletal:      Cervical back: Normal range of motion and neck supple.      Right lower leg: No edema.      Left lower leg: No edema.   Skin:     General: Skin is warm and dry.      Capillary Refill: Capillary refill takes less than 2 seconds.   Neurological:      General: No focal deficit present.      Mental Status: He is alert and oriented to person, place, and time. Mental status is at baseline.   Psychiatric:         Mood and Affect: Mood normal.         Behavior: Behavior normal.       Lab Results   Component Value Date    CHOLSTRLTOT 160 10/28/2021    LDL 94 10/28/2021    HDL 42 10/28/2021    TRIGLYCERIDE 120 10/28/2021      Lab Results   Component Value Date    SODIUM 141 2022    POTASSIUM 4.7 2022    CHLORIDE 106 2022    CO2 24 2022    GLUCOSE 120 (H) 2022    BUN 18 2022    CREATININE 1.04 2022    IFA[place] >60 10/28/2021    IFNOTAFR >60 10/28/2021        Lab Results   Component Value Date    WBC 6.5 2020    RBC 5.10 2020    HEMOGLOBIN 16.1 2020    HEMATOCRIT 47.5 2020    MCV 93.1 2020    MCH 31.6 2020    MCHC 33.9 2020    MPV 10.7 2020     VASCULAR IMAGING:   Last EKG:   Results for orders placed or performed during the hospital encounter of 20   EKG   Result Value Ref Range    Report       [facility]    Test Date:  2020  Pt Name:    REJI MAYA              Department: ER  MRN:        [id number]                      Room:       [facility]  Gender:     Male                         Technician: [id number]  :        1955                    Requested By:EMRE LOPEZ  Order #:    [id number]                    Reading MD: EMRE LOPEZ MD    Measurements  Intervals                                Axis  Rate:       60                           P:          -3  MS:         152                          QRS:        49  QRSD:       94                           T:          -8  QT:         412  QTc:        412    Interpretive Statements  SINUS RHYTHM  BORDERLINE T ABNORMALITIES, INFERIOR LEADS  Compared to ECG 2018 20:47:23  T-wave abnormality now present  Sinus bradycardia no longer present  Electronically Signed On 3-7-2020 14:10:37 PST by EMRE LOPEZ MD     EKG in four (4) hours   Result Value Ref Range    Report       [facility]    Test Date:  2020  Pt Name:    REJI MAYA              Department: [facility]  MRN:        [id number]                      Room:       T206  Gender:     Male                         Technician: [id number]  :        1955                   Requested By:ALEKSANDRA MARTINEZ  Order #:    [id number]                    Reading MD: Sonia Loza MD    Measurements  Intervals                                Axis  Rate:       64                           P:          42  MS:         152                          QRS:        58  QRSD:       96                           T:          5  QT:         388  QTc:        401    Interpretive Statements  SINUS RHYTHM  NON-SPECIFIC T-WAVE CHANGES  Compared to ECG 2020 13:45:25  No significant change noted  Electronically Signed On 3-7-2020 23:29:08 PST by Sonia Loza MD       CTA neck 2018  Unremarkable CT angiogram of the neck. No high-grade stenosis, large vessel occlusion, aneurysm or dissection.    CTA head 2018   1.  No large vessel occlusion, high-grade stenosis or aneurysm of the [place].  2.  No CT evidence of acute infarct, hemorrhage or mass.  1.  Cerebral blood flow less than 30% likely representing completed infarct = 0 mL.   2.  T Max more than 6  seconds likely representing combination of completed infarct and ischemia = 0 mL.   3.  Mismatched volume likely representing ischemic brain/penumbra = 0 mL.   4.  Please note that the cerebral perfusion was performed on the limited brain tissue around the basal ganglia region. Infarct/ischemia outside the CT perfusion sections can be missed in this study.    MRI brain 2018   1.  No acute abnormality.  2.  Mild cerebral atrophy.  3.  Minimal chronic microvascular ischemic disease.    MPI 3/2020   NUCLEAR IMAGING INTERPRETATION   No evidence of significant jeopardized viable myocardium or prior myocardial    infarction.   Normal left ventricular size, ejection fraction, and wall motion.    Echo 3/2020  Normal left ventricular systolic function. Left ventricular ejection   fraction is visually estimated to be 60%.   Normal diastolic function.  Normal inferior vena cava size without inspiratory collapse.    CT chest 5/2020  1.  There is no lung mass or destructive bony process to explain the patient's right chest wall pain.  2.  There is a nonspecific 4 mm right upper lobe lung nodule, probably an area of scarring.  3.  There is a calcified granuloma in the left lower lobe with small calcified mediastinal nodes consistent with prior granulomatous inflammatory process.  4.  There is aortic atherosclerosis.    Carotid duplex 7/15/2020 ([facility])  Moderate 40-69% R ICA ,  No stenosis L ICA   bilat moderate atherosclerotic changes     Carotid duplex 7/13/21  Mildly elevated velocity in the right proximal ICA may suggest 50-69%    stenosis.    Medical Decision Making:  Today's Assessment / Status / Plan:     1. Carotid artery stenosis, asymptomatic, right     2. Former smoker     3. Primary hypertension     4. Other hyperlipidemia     5. History of TIA (transient ischemic attack)       Patient Type: Primary Prevention    Etiology of Established CVD if Present:   1) R ICA stenosis, moderate   2) aortic athero  3) TIA,  11/2018    Antithrombotic therapy:  Indication: carotid stenosis   Anti-Platelet/Anti-Coagulant Tx: yes  - continue ASA 81mg daily     Lipid Management: Qualifies for Statin Therapy Based on 2018 ACC/AHA Guidelines: yes  Calculated 10-Year Risk of ASCVD (if LDL <189, no CKD G3b/4/5): N/A  Currently on Statin: Started at this visit  NLA/ACC/AHA risk category:   High:  Carotid disease, 3+ major risk factors    Tx threshold: non-HDL-C >129, LDL-C >99  Tx goal:  non-HDL <130, LDL-C <100 (optional LDLc <70)  At goal:  Yes 10/2021  Plan:  - reinforced ongoing TLC measures   - update labs now   - Continue rosuvastatin 10mg daily with vit D3 5,000 units daily     Blood Pressure Management: Goal: ACC/AHA (2017) goal <130/80  Home BP at goal:  Not checking   Office BP at goal:  Yes  Inidications of end organ damage:   Echo/EKG: N/A    UACR: pending    Renal parenchymal disease:  normal   Ophthalmo: N/A      Plan:   Monitoring:   - start/continue home BP monitoring, reviewed correct technique, provide BP log and instructions  - order 24h ABPM:  NO  - monitor lytes/gfr routinely   - contact office if BP consistently >140/>90 to discussion of tx adjustments   Medications:  ACEi/ARB: continue benazepril to 20mg QHS (combo)  DHP-CCB: continue amlodipine to 5mg QHS (combo)  Thiazide: none   Valerio-receptor Antagonist: not indicated at this time     Glycemic Status: Normal    Smoking: maintenance stage    reports that he quit smoking about 3 years ago. His smoking use included cigarettes. He started smoking about 42 years ago. He has a 40.00 pack-year smoking history. He has never used smokeless tobacco.   Provided strong recommendation for complete cessation and informed this is the primary contributor to the majority of all ASCVD and cancer-related conditions and can result in significant morbidity and early mortality.   - reviewed resources for cessation including tobacco cessation clinic visit, pharmacotx meds, quit lines  -  review at every visit      Physical Activity: continue healthy activity to improve CV fitness, see care instructions for additional details. Walking     Weight Management and Nutrition: Dietary plan was discussed with patient at this visit including DASH, low sodium and/or as outlined in care instructions, low fat      Other:   1) R ICA stenosis, moderate, no symptoms.   No indications or surgical intervention at this time   - continue med mgmt   - update duplex in 1 yr (July), consider CTA if worsening stenosis     2) TIA, episode of ataxia   Normal MRI, CTA head/neck   - continue med mgmt     Instructed to follow-up with PCP for remainder of adult medical needs: yes  We will partner with other providers in the management of established vascular disease and cardiometabolic risk factors.    Studies to Be Obtained: carotid duplex 7/2022  Ordered and scheduled  Labs to Be Obtained: Lipid/CMP- he will get done today- will call with result    Follow up in:After July imaging  6 months   Time: 23 min - - chart review/prep, review of other providers' records, imaging/lab review, face-to-face time for history/examination, ordering, prescribing,  review of results/meds/ treatment plan with patient/family/caregiver, documentation in EMR, care coordination (as needed)    RADAMES Huizar.  [facility]   [facility]

## 2022-04-29 DIAGNOSIS — I10 ESSENTIAL HYPERTENSION: ICD-10-CM

## 2022-05-02 RX ORDER — AMLODIPINE BESYLATE AND BENAZEPRIL HYDROCHLORIDE 5; 20 MG/1; MG/1
CAPSULE ORAL
Qty: 30 CAPSULE | Refills: 5 | Status: SHIPPED | OUTPATIENT
Start: 2022-05-02 | End: 2022-11-04

## 2022-05-20 ENCOUNTER — APPOINTMENT (OUTPATIENT)
Dept: RADIOLOGY | Facility: MEDICAL CENTER | Age: 67
End: 2022-05-20
Payer: MEDICARE

## 2022-05-24 ENCOUNTER — APPOINTMENT (OUTPATIENT)
Dept: RADIOLOGY | Facility: MEDICAL CENTER | Age: 67
End: 2022-05-24
Payer: MEDICARE

## 2022-05-25 ENCOUNTER — HOSPITAL ENCOUNTER (OUTPATIENT)
Dept: LAB | Facility: MEDICAL CENTER | Age: 67
End: 2022-05-25
Attending: ORTHOPAEDIC SURGERY
Payer: COMMERCIAL

## 2022-05-25 ENCOUNTER — HOSPITAL ENCOUNTER (OUTPATIENT)
Dept: RADIOLOGY | Facility: MEDICAL CENTER | Age: 67
End: 2022-05-25
Payer: COMMERCIAL

## 2022-05-25 ENCOUNTER — HOSPITAL ENCOUNTER (OUTPATIENT)
Dept: CARDIOLOGY | Facility: MEDICAL CENTER | Age: 67
End: 2022-05-25
Attending: ORTHOPAEDIC SURGERY
Payer: COMMERCIAL

## 2022-05-25 DIAGNOSIS — Z01.810 PRE-OPERATIVE CARDIOVASCULAR EXAMINATION: ICD-10-CM

## 2022-05-25 DIAGNOSIS — Z01.812 PRE-OPERATIVE LABORATORY EXAMINATION: ICD-10-CM

## 2022-05-25 DIAGNOSIS — Z01.818 PREOP EXAMINATION: ICD-10-CM

## 2022-05-25 DIAGNOSIS — R10.13 ABDOMINAL PAIN, EPIGASTRIC: ICD-10-CM

## 2022-05-25 DIAGNOSIS — Z01.811 PRE-OPERATIVE RESPIRATORY EXAMINATION: ICD-10-CM

## 2022-05-25 LAB
ANION GAP SERPL CALC-SCNC: 9 MMOL/L (ref 7–16)
BASOPHILS # BLD AUTO: 1.1 % (ref 0–1.8)
BASOPHILS # BLD: 0.06 K/UL (ref 0–0.12)
BUN SERPL-MCNC: 13 MG/DL (ref 8–22)
CALCIUM SERPL-MCNC: 8.8 MG/DL (ref 8.5–10.5)
CHLORIDE SERPL-SCNC: 107 MMOL/L (ref 96–112)
CO2 SERPL-SCNC: 25 MMOL/L (ref 20–33)
CREAT SERPL-MCNC: 0.83 MG/DL (ref 0.5–1.4)
EKG IMPRESSION: NORMAL
EOSINOPHIL # BLD AUTO: 0.16 K/UL (ref 0–0.51)
EOSINOPHIL NFR BLD: 2.8 % (ref 0–6.9)
ERYTHROCYTE [DISTWIDTH] IN BLOOD BY AUTOMATED COUNT: 43 FL (ref 35.9–50)
GFR SERPLBLD CREATININE-BSD FMLA CKD-EPI: 95 ML/MIN/1.73 M 2
GLUCOSE SERPL-MCNC: 128 MG/DL (ref 65–99)
HCT VFR BLD AUTO: 43.5 % (ref 42–52)
HGB BLD-MCNC: 15.1 G/DL (ref 14–18)
IMM GRANULOCYTES # BLD AUTO: 0.04 K/UL (ref 0–0.11)
IMM GRANULOCYTES NFR BLD AUTO: 0.7 % (ref 0–0.9)
LYMPHOCYTES # BLD AUTO: 1.76 K/UL (ref 1–4.8)
LYMPHOCYTES NFR BLD: 31.1 % (ref 22–41)
MCH RBC QN AUTO: 31.3 PG (ref 27–33)
MCHC RBC AUTO-ENTMCNC: 34.7 G/DL (ref 33.7–35.3)
MCV RBC AUTO: 90.2 FL (ref 81.4–97.8)
MONOCYTES # BLD AUTO: 0.44 K/UL (ref 0–0.85)
MONOCYTES NFR BLD AUTO: 7.8 % (ref 0–13.4)
NEUTROPHILS # BLD AUTO: 3.2 K/UL (ref 1.82–7.42)
NEUTROPHILS NFR BLD: 56.5 % (ref 44–72)
NRBC # BLD AUTO: 0 K/UL
NRBC BLD-RTO: 0 /100 WBC
PLATELET # BLD AUTO: 219 K/UL (ref 164–446)
PMV BLD AUTO: 10.6 FL (ref 9–12.9)
POTASSIUM SERPL-SCNC: 4.1 MMOL/L (ref 3.6–5.5)
RBC # BLD AUTO: 4.82 M/UL (ref 4.7–6.1)
SODIUM SERPL-SCNC: 141 MMOL/L (ref 135–145)
WBC # BLD AUTO: 5.7 K/UL (ref 4.8–10.8)

## 2022-05-25 PROCEDURE — 80048 BASIC METABOLIC PNL TOTAL CA: CPT

## 2022-05-25 PROCEDURE — 85025 COMPLETE CBC W/AUTO DIFF WBC: CPT

## 2022-05-25 PROCEDURE — 71046 X-RAY EXAM CHEST 2 VIEWS: CPT

## 2022-05-25 PROCEDURE — 76700 US EXAM ABDOM COMPLETE: CPT

## 2022-05-25 PROCEDURE — 93005 ELECTROCARDIOGRAM TRACING: CPT

## 2022-05-25 PROCEDURE — 36415 COLL VENOUS BLD VENIPUNCTURE: CPT

## 2022-05-25 PROCEDURE — 93010 ELECTROCARDIOGRAM REPORT: CPT | Performed by: INTERNAL MEDICINE

## 2022-06-01 ENCOUNTER — TELEPHONE (OUTPATIENT)
Dept: SLEEP MEDICINE | Facility: MEDICAL CENTER | Age: 67
End: 2022-06-01
Payer: COMMERCIAL

## 2022-06-01 NOTE — TELEPHONE ENCOUNTER
Received faxed request from [facility] for pulmonary surgical clearance. Request scanned in media. Nothing to complete, requesting clearance letter         1. Caller Name: Adrián Sheldon                 Call Back Number: [phone] (home)       Patient approves a detailed voicemail message: N\A    2.  What is the procedure: Right L3-4, L4-5 lumbar laminotomy     3.  When is the procedure scheduled: 6/14/22    4.  Who is the Surgeon: Dr. Calvin Kaur     5. Has the patient completed any screening tests for the procedure:   none  6. Has PCP received a written request from Surgeon: unknown    7. Patient scheduled for an appointment for this clearance?:  no    8. Last OV: 3/28/22 Dr. Mathur     9. Next OV: 8/17/22 SHERI Galindo pa-C     10. Last CXR: 2 v 5/25/22    11. Last PFT: 2/24/22    12. Last CT ct lung cancer 4/12/22    Current Medications  Current Outpatient Medications   Medication Sig Dispense Refill   • amlodipine-benazepril (LOTREL) 5-20 MG per capsule TAKE ONE CAPSULE BY MOUTH EVERY DAY 30 Capsule 5   • polyethylene glycol-electrolytes (NULYTELY) 420 GM solution      • esomeprazole (NEXIUM) 40 MG delayed-release capsule      • Fluticasone Furoate-Vilanterol (BREO ELLIPTA) 100-25 MCG/INH AEROSOL POWDER, BREATH ACTIVATED Inhale 1 Puff every day. Rinse mouth after use. 1 Each 0   • rosuvastatin (CRESTOR) 20 MG Tab Take 1 Tablet by mouth every day. 90 Tablet 3   • tamsulosin (FLOMAX) 0.4 MG capsule      • aspirin EC (ECOTRIN) 81 MG Tablet Delayed Response Take 81 mg by mouth every day.     • albuterol 108 (90 Base) MCG/ACT Aero Soln inhalation aerosol Inhale 2 Puffs by mouth every 6 hours as needed for Shortness of Breath. 1 Each 2     No current facility-administered medications for this visit.       Please advise.

## 2022-06-04 ENCOUNTER — OFFICE VISIT (OUTPATIENT)
Dept: URGENT CARE | Facility: PHYSICIAN GROUP | Age: 67
End: 2022-06-04
Payer: COMMERCIAL

## 2022-06-04 ENCOUNTER — TELEPHONE (OUTPATIENT)
Dept: URGENT CARE | Facility: PHYSICIAN GROUP | Age: 67
End: 2022-06-04

## 2022-06-04 VITALS
SYSTOLIC BLOOD PRESSURE: 122 MMHG | BODY MASS INDEX: 23.34 KG/M2 | RESPIRATION RATE: 16 BRPM | OXYGEN SATURATION: 97 % | WEIGHT: 154 LBS | TEMPERATURE: 98 F | DIASTOLIC BLOOD PRESSURE: 60 MMHG | HEIGHT: 68 IN | HEART RATE: 67 BPM

## 2022-06-04 DIAGNOSIS — T78.40XA ALLERGIC SYMPTOMS, INITIAL ENCOUNTER: ICD-10-CM

## 2022-06-04 PROCEDURE — 99213 OFFICE O/P EST LOW 20 MIN: CPT | Performed by: FAMILY MEDICINE

## 2022-06-04 RX ORDER — TRIAMCINOLONE ACETONIDE 40 MG/ML
60 INJECTION, SUSPENSION INTRA-ARTICULAR; INTRAMUSCULAR ONCE
Status: DISCONTINUED | OUTPATIENT
Start: 2022-06-04 | End: 2022-06-04

## 2022-06-04 RX ORDER — PREDNISONE 20 MG/1
TABLET ORAL
Qty: 7 TABLET | Refills: 0 | Status: SHIPPED | OUTPATIENT
Start: 2022-06-04 | End: 2022-07-19

## 2022-06-04 RX ORDER — DEXAMETHASONE SODIUM PHOSPHATE 10 MG/ML
10 INJECTION INTRAMUSCULAR; INTRAVENOUS ONCE
Status: COMPLETED | OUTPATIENT
Start: 2022-06-04 | End: 2022-06-04

## 2022-06-04 RX ADMIN — DEXAMETHASONE SODIUM PHOSPHATE 10 MG: 10 INJECTION INTRAMUSCULAR; INTRAVENOUS at 16:13

## 2022-06-04 ASSESSMENT — FIBROSIS 4 INDEX: FIB4 SCORE: 1.48

## 2022-06-04 NOTE — PROGRESS NOTES
Chief Complaint:    Chief Complaint   Patient presents with   • Sinus Problem     X 1 week, nothing OTC is working    • Cough     Lung pain when coughs       History of Present Illness:    Symptoms x 1 week - nasal congestion, pressure around eyes, and cough with some mild discomfort in lower lungs. No fever, purulent mucus, or sore throat.      Past Medical History:    Past Medical History:   Diagnosis Date   • Arthritis    • Asthma without status asthmaticus 3/7/2020   • Bronchitis    • Carotid artery stenosis, asymptomatic, right 8/28/2020   • Cataract    • Chest pain    • Chest tightness    • Constipation    • COPD (chronic obstructive pulmonary disease) ([facility]) 3/7/2020   • Frequent urination    • Yoruba measles    • Hearing difficulty    • Heartburn    • Hypertension    • Painful joint    • Shortness of breath    • Sweat, sweating, excessive    • Vision loss    • Wears glasses      Past Surgical History:    History reviewed. No pertinent surgical history.    Social History:    Social History     Socioeconomic History   • Marital status:      Spouse name: Not on file   • Number of children: Not on file   • Years of education: Not on file   • Highest education level: Not on file   Occupational History   • Not on file   Tobacco Use   • Smoking status: Former Smoker     Packs/day: 1.00     Years: 40.00     Pack years: 40.00     Types: Cigarettes     Start date: 1/23/1980     Quit date: 2/25/2019     Years since quitting: 3.2   • Smokeless tobacco: Never Used   Vaping Use   • Vaping Use: Never used   Substance and Sexual Activity   • Alcohol use: Yes     Comment: moderately    • Drug use: No   • Sexual activity: Not on file   Other Topics Concern   • Not on file   Social History Narrative   • Not on file     Social Determinants of Health     Financial Resource Strain: Not on file   Food Insecurity: Not on file   Transportation Needs: Not on file   Physical Activity: Not on file   Stress: Not on file   Social  "Connections: Not on file   Intimate Partner Violence: Not on file   Housing Stability: Not on file     Family History:    Family History   Problem Relation Age of Onset   • Heart Disease Mother    • Heart Disease Father      Medications:    Current Outpatient Medications on File Prior to Visit   Medication Sig Dispense Refill   • amlodipine-benazepril (LOTREL) 5-20 MG per capsule TAKE ONE CAPSULE BY MOUTH EVERY DAY 30 Capsule 5   • polyethylene glycol-electrolytes (NULYTELY) 420 GM solution      • esomeprazole (NEXIUM) 40 MG delayed-release capsule      • Fluticasone Furoate-Vilanterol (BREO ELLIPTA) 100-25 MCG/INH AEROSOL POWDER, BREATH ACTIVATED Inhale 1 Puff every day. Rinse mouth after use. 1 Each 0   • rosuvastatin (CRESTOR) 20 MG Tab Take 1 Tablet by mouth every day. 90 Tablet 3   • tamsulosin (FLOMAX) 0.4 MG capsule      • aspirin EC (ECOTRIN) 81 MG Tablet Delayed Response Take 81 mg by mouth every day.     • albuterol 108 (90 Base) MCG/ACT Aero Soln inhalation aerosol Inhale 2 Puffs by mouth every 6 hours as needed for Shortness of Breath. 1 Each 2     No current facility-administered medications on file prior to visit.     Allergies:    No Known Allergies      Vitals:    Vitals:    06/04/22 1541   BP: 122/60   Pulse: 67   Resp: 16   Temp: 36.7 °C (98 °F)   TempSrc: Temporal   SpO2: 97%   Weight: 69.9 kg (154 lb)   Height: 1.727 m (5' 8\")       Physical Exam:    Constitutional: Vital signs reviewed. Appears well-developed and well-nourished. No acute distress.   Eyes: Sclera white, conjunctivae clear. PERRLA.  ENT: Bilateral nasal congestion with pink nasal mucosa. No sinus TTP. External ears normal. External auditory canals normal without discharge. TMs translucent and non-bulging. Hearing normal. Lips/teeth are normal. Oral mucosa pink and moist. Posterior pharynx: WNL.  Neck: Neck supple.   Cardiovascular: Regular rate and rhythm. No murmur.  Pulmonary/Chest: Respirations non-labored. Clear to " auscultation bilaterally.  Musculoskeletal: Normal gait. No muscular atrophy or weakness.  Neurological: Alert and oriented to person, place, and time. CN 2-12 intact. Muscle tone normal. Coordination normal.   Skin: No rashes or lesions. Warm, dry, normal turgor.  Psychiatric: Normal mood and affect. Behavior is normal. Judgment and thought content normal.       Medical Decision Makin. Allergic symptoms, initial encounter  - dexamethasone (DECADRON) injection (check route below) 10 mg  - predniSONE (DELTASONE) 20 MG Tab; 1 TAB BY MOUTH ONCE A DAY X 7 DAYS. TAKE WITH FOOD.  Dispense: 7 Tablet; Refill: 0      Discussed with him DDX, management options, and risks, benefits, and alternatives to treatment plan agreed upon.    Symptoms x 1 week - nasal congestion, pressure around eyes, and cough with some mild discomfort in lower lungs. No fever, purulent mucus, or sore throat.    Bilateral nasal congestion with pink nasal mucosa. No sinus TTP.    Suspect allergies as cause of symptoms. He was initially agreeable to Kenalog IM, but we were out.    Agreeable to medications given and prescribed.    Discussed expected course of duration, time for improvement, and to seek follow-up in Emergency Room, urgent care, or with PCP if getting worse at any time or not improving within expected time frame.

## 2022-06-06 ENCOUNTER — DOCUMENTATION (OUTPATIENT)
Dept: VASCULAR LAB | Facility: MEDICAL CENTER | Age: 67
End: 2022-06-06
Payer: COMMERCIAL

## 2022-06-06 DIAGNOSIS — Z86.73 HISTORY OF TIA (TRANSIENT ISCHEMIC ATTACK): ICD-10-CM

## 2022-06-06 DIAGNOSIS — Z91.89 AT RISK FOR ALTERED CARDIAC FUNCTION: ICD-10-CM

## 2022-06-07 ENCOUNTER — HOSPITAL ENCOUNTER (OUTPATIENT)
Dept: LAB | Facility: MEDICAL CENTER | Age: 67
End: 2022-06-07
Attending: NURSE PRACTITIONER
Payer: COMMERCIAL

## 2022-06-07 DIAGNOSIS — R73.01 IMPAIRED FASTING GLUCOSE: ICD-10-CM

## 2022-06-07 DIAGNOSIS — I10 PRIMARY HYPERTENSION: ICD-10-CM

## 2022-06-07 DIAGNOSIS — Z91.89 AT RISK FOR ALTERED CARDIAC FUNCTION: ICD-10-CM

## 2022-06-07 DIAGNOSIS — Z79.02 LONG TERM CURRENT USE OF ANTITHROMBOTICS/ANTIPLATELETS: ICD-10-CM

## 2022-06-07 DIAGNOSIS — E78.49 OTHER HYPERLIPIDEMIA: ICD-10-CM

## 2022-06-07 DIAGNOSIS — Z86.73 HISTORY OF TIA (TRANSIENT ISCHEMIC ATTACK): ICD-10-CM

## 2022-06-07 DIAGNOSIS — I65.21 CAROTID ARTERY STENOSIS, ASYMPTOMATIC, RIGHT: ICD-10-CM

## 2022-06-07 LAB
ALBUMIN SERPL BCP-MCNC: 4.2 G/DL (ref 3.2–4.9)
ALBUMIN/GLOB SERPL: 1.9 G/DL
ALP SERPL-CCNC: 67 U/L (ref 30–99)
ALT SERPL-CCNC: 14 U/L (ref 2–50)
ANION GAP SERPL CALC-SCNC: 9 MMOL/L (ref 7–16)
AST SERPL-CCNC: 20 U/L (ref 12–45)
BASOPHILS # BLD AUTO: 0.7 % (ref 0–1.8)
BASOPHILS # BLD: 0.04 K/UL (ref 0–0.12)
BILIRUB SERPL-MCNC: 0.3 MG/DL (ref 0.1–1.5)
BUN SERPL-MCNC: 14 MG/DL (ref 8–22)
CALCIUM SERPL-MCNC: 8.7 MG/DL (ref 8.5–10.5)
CHLORIDE SERPL-SCNC: 103 MMOL/L (ref 96–112)
CHOLEST SERPL-MCNC: 121 MG/DL (ref 100–199)
CO2 SERPL-SCNC: 26 MMOL/L (ref 20–33)
CREAT SERPL-MCNC: 0.78 MG/DL (ref 0.5–1.4)
EOSINOPHIL # BLD AUTO: 0.09 K/UL (ref 0–0.51)
EOSINOPHIL NFR BLD: 1.6 % (ref 0–6.9)
ERYTHROCYTE [DISTWIDTH] IN BLOOD BY AUTOMATED COUNT: 44.7 FL (ref 35.9–50)
EST. AVERAGE GLUCOSE BLD GHB EST-MCNC: 111 MG/DL
FASTING STATUS PATIENT QL REPORTED: NORMAL
GFR SERPLBLD CREATININE-BSD FMLA CKD-EPI: 96 ML/MIN/1.73 M 2
GLOBULIN SER CALC-MCNC: 2.2 G/DL (ref 1.9–3.5)
GLUCOSE SERPL-MCNC: 133 MG/DL (ref 65–99)
HBA1C MFR BLD: 5.5 % (ref 4–5.6)
HCT VFR BLD AUTO: 45.7 % (ref 42–52)
HDLC SERPL-MCNC: 46 MG/DL
HGB BLD-MCNC: 15.8 G/DL (ref 14–18)
IMM GRANULOCYTES # BLD AUTO: 0.05 K/UL (ref 0–0.11)
IMM GRANULOCYTES NFR BLD AUTO: 0.9 % (ref 0–0.9)
LDLC SERPL CALC-MCNC: 60 MG/DL
LYMPHOCYTES # BLD AUTO: 2.22 K/UL (ref 1–4.8)
LYMPHOCYTES NFR BLD: 40.2 % (ref 22–41)
MCH RBC QN AUTO: 31.9 PG (ref 27–33)
MCHC RBC AUTO-ENTMCNC: 34.6 G/DL (ref 33.7–35.3)
MCV RBC AUTO: 92.3 FL (ref 81.4–97.8)
MONOCYTES # BLD AUTO: 0.46 K/UL (ref 0–0.85)
MONOCYTES NFR BLD AUTO: 8.3 % (ref 0–13.4)
NEUTROPHILS # BLD AUTO: 2.66 K/UL (ref 1.82–7.42)
NEUTROPHILS NFR BLD: 48.3 % (ref 44–72)
NRBC # BLD AUTO: 0 K/UL
NRBC BLD-RTO: 0 /100 WBC
NT-PROBNP SERPL IA-MCNC: 84 PG/ML (ref 0–125)
PLATELET # BLD AUTO: 186 K/UL (ref 164–446)
PMV BLD AUTO: 10.6 FL (ref 9–12.9)
POTASSIUM SERPL-SCNC: 4.7 MMOL/L (ref 3.6–5.5)
PROT SERPL-MCNC: 6.4 G/DL (ref 6–8.2)
RBC # BLD AUTO: 4.95 M/UL (ref 4.7–6.1)
SODIUM SERPL-SCNC: 138 MMOL/L (ref 135–145)
TRIGL SERPL-MCNC: 73 MG/DL (ref 0–149)
WBC # BLD AUTO: 5.5 K/UL (ref 4.8–10.8)

## 2022-06-07 PROCEDURE — 80053 COMPREHEN METABOLIC PANEL: CPT

## 2022-06-07 PROCEDURE — 36415 COLL VENOUS BLD VENIPUNCTURE: CPT

## 2022-06-07 PROCEDURE — 85025 COMPLETE CBC W/AUTO DIFF WBC: CPT

## 2022-06-07 PROCEDURE — 83880 ASSAY OF NATRIURETIC PEPTIDE: CPT

## 2022-06-07 PROCEDURE — 83036 HEMOGLOBIN GLYCOSYLATED A1C: CPT

## 2022-06-07 PROCEDURE — 80061 LIPID PANEL: CPT

## 2022-06-09 ENCOUNTER — DOCUMENTATION (OUTPATIENT)
Dept: VASCULAR LAB | Facility: MEDICAL CENTER | Age: 67
End: 2022-06-09
Payer: COMMERCIAL

## 2022-06-09 NOTE — PROGRESS NOTES
2 points  Class III Risk  10.1 %  30-day risk of death, MI, or cardiac arrest  BNP 84      Preoperative clearance:   Seeking clearance for Right L3-4, L4-5 Lumbar Laminotomy and general/regional anesthesia   No indications of acute cardiopulmonary disease   RCRI score = 2 points, indicating 10.1% MACE risk   No additional CV testing needed   - copy of encounter to be faxed to Dr. Kaur's office attention Sarah Quintanilla at [phone]  - recommended for postop VTE prophylaxis with high dose ASA or DOAC, defer decision-making to surgeon along with early mobilization.   Pt is aware of the risk and would like to proceed.   RADAMES Huizar.

## 2022-07-01 ENCOUNTER — HOSPITAL ENCOUNTER (OUTPATIENT)
Facility: MEDICAL CENTER | Age: 67
End: 2022-07-01
Attending: PHYSICIAN ASSISTANT
Payer: COMMERCIAL

## 2022-07-01 PROCEDURE — 84153 ASSAY OF PSA TOTAL: CPT

## 2022-07-02 LAB — PSA SERPL-MCNC: 5.4 NG/ML (ref 0–4)

## 2022-07-07 ENCOUNTER — DOCUMENTATION (OUTPATIENT)
Dept: VASCULAR LAB | Facility: MEDICAL CENTER | Age: 67
End: 2022-07-07
Payer: COMMERCIAL

## 2022-07-07 NOTE — PROGRESS NOTES
Called pt to remind that the  Carotid US  is due for surveillance. Scheduling office and our office phone numbers provided. RADAMES Huizar.

## 2022-07-19 ENCOUNTER — OFFICE VISIT (OUTPATIENT)
Dept: URGENT CARE | Facility: PHYSICIAN GROUP | Age: 67
End: 2022-07-19
Payer: COMMERCIAL

## 2022-07-19 VITALS
SYSTOLIC BLOOD PRESSURE: 142 MMHG | WEIGHT: 154 LBS | HEIGHT: 68 IN | TEMPERATURE: 97.2 F | BODY MASS INDEX: 23.34 KG/M2 | HEART RATE: 60 BPM | DIASTOLIC BLOOD PRESSURE: 60 MMHG | RESPIRATION RATE: 16 BRPM | OXYGEN SATURATION: 98 %

## 2022-07-19 DIAGNOSIS — M25.473 ANKLE SWELLING, UNSPECIFIED LATERALITY: ICD-10-CM

## 2022-07-19 DIAGNOSIS — H53.8 BLURRY VISION, BILATERAL: ICD-10-CM

## 2022-07-19 LAB
HBA1C MFR BLD: 6 % (ref 0–5.6)
INT CON NEG: ABNORMAL
INT CON POS: ABNORMAL

## 2022-07-19 PROCEDURE — 83036 HEMOGLOBIN GLYCOSYLATED A1C: CPT | Performed by: FAMILY MEDICINE

## 2022-07-19 PROCEDURE — 99213 OFFICE O/P EST LOW 20 MIN: CPT | Performed by: FAMILY MEDICINE

## 2022-07-19 ASSESSMENT — FIBROSIS 4 INDEX: FIB4 SCORE: 1.98

## 2022-07-19 ASSESSMENT — ENCOUNTER SYMPTOMS: FEVER: 0

## 2022-07-19 NOTE — PROGRESS NOTES
"Subjective:     Adrián Sheldon is a 67 y.o. male who presents for Eye Problem (Eye pain ) and Foot Pain    HPI  Pt presents for evaluation of an acute problem  Pt with bilateral foot tingling   Has been present the past several months   Has a \"hot\" sensation sometimes   Having some blurry vision the past month   Hx of cataract surgery BL   Has had photophobia since cataract surgery    Review of Systems   Constitutional: Negative for fever.   Skin: Negative for rash.       PMH:  has a past medical history of Arthritis, Asthma without status asthmaticus (3/7/2020), Bronchitis, Carotid artery stenosis, asymptomatic, right (8/28/2020), Cataract, Chest pain, Chest tightness, Constipation, COPD (chronic obstructive pulmonary disease) (HCC) (3/7/2020), Frequent urination, [place] measles, Hearing difficulty, Heartburn, Hypertension, Painful joint, Shortness of breath, Sweat, sweating, excessive, Vision loss, and Wears glasses.    He has no past medical history of Anginal syndrome ([facility]), Arrhythmia, At risk for sleep apnea, Back pain, Blood clotting disorder ([facility]), Cancer ([facility]), Congestive heart failure ([facility]), Diabetes ([facility]), Dialysis patient ([facility]), Disorder of thyroid, Fall, Glaucoma, Gynecological disorder, Heart murmur, Heart valve disease, Indigestion, Infectious disease, Jaundice, Myocardial infarct ([facility]), Pacemaker, Pneumonia, Psychiatric problem, Renal disorder, Rheumatic fever, Seizure ([facility]), or Urinary incontinence.  MEDS:   Current Outpatient Medications:   •  amlodipine-benazepril (LOTREL) 5-20 MG per capsule, TAKE ONE CAPSULE BY MOUTH EVERY DAY, Disp: 30 Capsule, Rfl: 5  •  polyethylene glycol-electrolytes (NULYTELY) 420 GM solution, , Disp: , Rfl:   •  esomeprazole (NEXIUM) 40 MG delayed-release capsule, , Disp: , Rfl:   •  Fluticasone Furoate-Vilanterol (BREO ELLIPTA) 100-25 MCG/INH AEROSOL POWDER, BREATH ACTIVATED, Inhale 1 Puff every day. Rinse mouth after use., Disp: 1 Each, Rfl: 0  •  rosuvastatin " "(CRESTOR) 20 MG Tab, Take 1 Tablet by mouth every day., Disp: 90 Tablet, Rfl: 3  •  tamsulosin (FLOMAX) 0.4 MG capsule, , Disp: , Rfl:   •  aspirin EC (ECOTRIN) 81 MG Tablet Delayed Response, Take 81 mg by mouth every day., Disp: , Rfl:   •  albuterol 108 (90 Base) MCG/ACT Aero Soln inhalation aerosol, Inhale 2 Puffs by mouth every 6 hours as needed for Shortness of Breath., Disp: 1 Each, Rfl: 2  ALLERGIES: No Known Allergies  SURGHX: History reviewed. No pertinent surgical history.  SOCHX:  reports that he quit smoking about 3 years ago. His smoking use included cigarettes. He started smoking about 42 years ago. He has a 40.00 pack-year smoking history. He has never used smokeless tobacco. He reports current alcohol use. He reports that he does not use drugs.     Objective:   BP (!) 142/60   Pulse 60   Temp 36.2 °C (97.2 °F) (Temporal)   Resp 16   Ht 1.727 m (5' 8\")   Wt 69.9 kg (154 lb)   SpO2 98%   BMI 23.42 kg/m²     Physical Exam  Constitutional:       General: He is not in acute distress.     Appearance: He is well-developed. He is not diaphoretic.   Eyes:      General: No scleral icterus.        Right eye: No discharge.         Left eye: No discharge.      Extraocular Movements: Extraocular movements intact.      Pupils: Pupils are equal, round, and reactive to light.      Comments: Slight scleral injection present bilaterally   Pulmonary:      Effort: Pulmonary effort is normal.   Musculoskeletal:      Comments: Mild nonpitting edema throughout bilateral ankles   Neurological:      Mental Status: He is alert.      Comments: Sensation to light touch equal and intact bilaterally throughout feet         Assessment/Plan:   Assessment    1. Blurry vision, bilateral  - POCT  A1C    2. Ankle swelling, unspecified laterality  - POCT  A1C    Patient with intermittent episodes of blurry vision in the past month.  Vision equal bilaterally and eye exam essentially normal.  Has history of cataract surgery and has " followed with an ophthalmologist in the past.  Recommended follow-up with his ophthalmologist for this issue as there is concern he could be developing early glaucoma versus other eye problems.  Patient agreeable and will follow-up with his ophthalmologist    Patient with ankle swelling.  Has normal sensation on exam.  Advised that the ankle swelling is likely physiologic and can worsen as he gets older.  Advised to decrease his salt intake to see if this can help his issue.  Follow-up with his PCP.

## 2022-07-21 ENCOUNTER — DOCUMENTATION (OUTPATIENT)
Dept: VASCULAR LAB | Facility: MEDICAL CENTER | Age: 67
End: 2022-07-21
Payer: COMMERCIAL

## 2022-07-21 NOTE — PROGRESS NOTES
Called pt to remind that the  Carotid duplex   is due for surveillance. Scheduling office and our office phone numbers provided. RADAMES Huizar.

## 2022-08-07 ENCOUNTER — OFFICE VISIT (OUTPATIENT)
Dept: URGENT CARE | Facility: PHYSICIAN GROUP | Age: 67
End: 2022-08-07
Payer: MEDICARE

## 2022-08-07 ENCOUNTER — APPOINTMENT (OUTPATIENT)
Dept: RADIOLOGY | Facility: IMAGING CENTER | Age: 67
End: 2022-08-07
Attending: FAMILY MEDICINE
Payer: MEDICARE

## 2022-08-07 ENCOUNTER — HOSPITAL ENCOUNTER (OUTPATIENT)
Facility: MEDICAL CENTER | Age: 67
End: 2022-08-07
Attending: FAMILY MEDICINE
Payer: MEDICARE

## 2022-08-07 VITALS
BODY MASS INDEX: 23.49 KG/M2 | TEMPERATURE: 99 F | DIASTOLIC BLOOD PRESSURE: 64 MMHG | HEIGHT: 68 IN | SYSTOLIC BLOOD PRESSURE: 150 MMHG | OXYGEN SATURATION: 95 % | WEIGHT: 155 LBS | RESPIRATION RATE: 20 BRPM | HEART RATE: 123 BPM

## 2022-08-07 DIAGNOSIS — J44.1 COPD WITH EXACERBATION (HCC): ICD-10-CM

## 2022-08-07 DIAGNOSIS — R05.9 COUGH: ICD-10-CM

## 2022-08-07 DIAGNOSIS — B34.9 VIRAL ILLNESS: ICD-10-CM

## 2022-08-07 DIAGNOSIS — R00.0 TACHYCARDIA: ICD-10-CM

## 2022-08-07 LAB
EXTERNAL QUALITY CONTROL: NORMAL
SARS-COV+SARS-COV-2 AG RESP QL IA.RAPID: NEGATIVE

## 2022-08-07 PROCEDURE — 99214 OFFICE O/P EST MOD 30 MIN: CPT | Mod: CS,25 | Performed by: FAMILY MEDICINE

## 2022-08-07 PROCEDURE — 87426 SARSCOV CORONAVIRUS AG IA: CPT | Performed by: FAMILY MEDICINE

## 2022-08-07 PROCEDURE — 93000 ELECTROCARDIOGRAM COMPLETE: CPT | Performed by: FAMILY MEDICINE

## 2022-08-07 PROCEDURE — 71046 X-RAY EXAM CHEST 2 VIEWS: CPT | Mod: TC,FY | Performed by: FAMILY MEDICINE

## 2022-08-07 PROCEDURE — 0240U HCHG SARS-COV-2 COVID-19 NFCT DS RESP RNA 3 TRGT MIC: CPT

## 2022-08-07 PROCEDURE — 94761 N-INVAS EAR/PLS OXIMETRY MLT: CPT | Performed by: FAMILY MEDICINE

## 2022-08-07 RX ORDER — DEXAMETHASONE 6 MG/1
6 TABLET ORAL DAILY
Qty: 5 TABLET | Refills: 0 | Status: SHIPPED | OUTPATIENT
Start: 2022-08-07 | End: 2022-08-12

## 2022-08-07 RX ORDER — CYCLOBENZAPRINE HCL 10 MG
TABLET ORAL
COMMUNITY
Start: 2022-06-06 | End: 2022-08-07

## 2022-08-07 RX ORDER — OMEPRAZOLE 40 MG/1
CAPSULE, DELAYED RELEASE ORAL
COMMUNITY
End: 2023-01-13

## 2022-08-07 RX ORDER — FLUTICASONE PROPIONATE 50 MCG
2 SPRAY, SUSPENSION (ML) NASAL
Qty: 16 G | Refills: 1 | Status: SHIPPED | OUTPATIENT
Start: 2022-08-07 | End: 2023-01-13

## 2022-08-07 RX ORDER — CYCLOBENZAPRINE HCL 10 MG
TABLET ORAL
COMMUNITY
End: 2023-01-13

## 2022-08-07 RX ORDER — DOXYCYCLINE HYCLATE 100 MG
100 TABLET ORAL EVERY 12 HOURS
Qty: 14 TABLET | Refills: 0 | Status: SHIPPED | OUTPATIENT
Start: 2022-08-07 | End: 2022-08-14

## 2022-08-07 RX ORDER — TAMSULOSIN HYDROCHLORIDE 0.4 MG/1
0.4 CAPSULE ORAL DAILY
COMMUNITY
End: 2022-08-07

## 2022-08-07 RX ORDER — AMLODIPINE BESYLATE 10 MG/1
20 TABLET ORAL DAILY
COMMUNITY
End: 2023-01-13

## 2022-08-07 RX ORDER — TADALAFIL 20 MG/1
TABLET ORAL
COMMUNITY
End: 2023-03-16

## 2022-08-07 RX ORDER — FINASTERIDE 5 MG/1
TABLET, FILM COATED ORAL
COMMUNITY
Start: 2022-07-01 | End: 2023-01-13

## 2022-08-07 RX ORDER — HYDROCODONE BITARTRATE AND ACETAMINOPHEN 10; 325 MG/1; MG/1
TABLET ORAL
COMMUNITY
Start: 2022-06-06 | End: 2022-08-07

## 2022-08-07 ASSESSMENT — ENCOUNTER SYMPTOMS
FEVER: 1
SHORTNESS OF BREATH: 1

## 2022-08-07 ASSESSMENT — FIBROSIS 4 INDEX: FIB4 SCORE: 1.98

## 2022-08-07 NOTE — PROGRESS NOTES
Subjective     Adrián Sheldon is a 67 y.o. male who presents with Fever (X1day ), Chills, and Shortness of Breath      - This is a pleasant and nontoxic appearing 67 y.o. male who has come to the walk-in clinic today for:    #1) yesterday developed body aches/malaise, stuffy nose w/ sinus pressure and headache, subjective fever and a little cough and feels a little sob and wheezing like asthma/copd acting up. No NV/CP, no diarrhea, no leg swelling. Has had covid vaccine before and booster, is worried about covid.       ALLERGIES:  Patient has no known allergies.     PMH:  Past Medical History:   Diagnosis Date   • Arthritis    • Asthma without status asthmaticus 3/7/2020   • Bronchitis    • Carotid artery stenosis, asymptomatic, right 8/28/2020   • Cataract    • Chest pain    • Chest tightness    • Constipation    • COPD (chronic obstructive pulmonary disease) ([facility]) 3/7/2020   • Frequent urination    • Slov[place] measles    • Hearing difficulty    • Heartburn    • Hypertension    • Painful joint    • Shortness of breath    • Sweat, sweating, excessive    • Vision loss    • Wears glasses         PSH:  History reviewed. No pertinent surgical history.    MEDS:    Current Outpatient Medications:   •  amLODIPine (NORVASC) 10 MG Tab, Take 20 mg by mouth every day., Disp: , Rfl:   •  tadalafil (CIALIS) 20 MG tablet, tadalafil 20 mg tablet  Take 1 tablet every day by oral route as needed., Disp: , Rfl:   •  omeprazole (PRILOSEC) 40 MG delayed-release capsule, omeprazole 40 mg capsule,delayed release, Disp: , Rfl:   •  finasteride (PROSCAR) 5 MG Tab, , Disp: , Rfl:   •  cyclobenzaprine (FLEXERIL) 10 mg Tab, cyclobenzaprine 10 mg tablet, Disp: , Rfl:   •  doxycycline (VIBRAMYCIN) 100 MG Tab, Take 1 Tablet by mouth every 12 hours for 7 days., Disp: 14 Tablet, Rfl: 0  •  dexamethasone (DECADRON) 6 MG Tab, Take 1 Tablet by mouth every day for 5 days., Disp: 5 Tablet, Rfl: 0  •  fluticasone (FLONASE) 50 MCG/ACT nasal spray,  "Administer 2 Sprays into affected nostril(S) at bedtime., Disp: 16 g, Rfl: 1  •  esomeprazole (NEXIUM) 40 MG delayed-release capsule, , Disp: , Rfl:   •  Fluticasone Furoate-Vilanterol (BREO ELLIPTA) 100-25 MCG/INH AEROSOL POWDER, BREATH ACTIVATED, Inhale 1 Puff every day. Rinse mouth after use., Disp: 1 Each, Rfl: 0  •  rosuvastatin (CRESTOR) 20 MG Tab, Take 1 Tablet by mouth every day., Disp: 90 Tablet, Rfl: 3  •  tamsulosin (FLOMAX) 0.4 MG capsule, , Disp: , Rfl:   •  albuterol 108 (90 Base) MCG/ACT Aero Soln inhalation aerosol, Inhale 2 Puffs by mouth every 6 hours as needed for Shortness of Breath., Disp: 1 Each, Rfl: 2  •  amlodipine-benazepril (LOTREL) 5-20 MG per capsule, TAKE ONE CAPSULE BY MOUTH EVERY DAY, Disp: 30 Capsule, Rfl: 5  •  aspirin EC (ECOTRIN) 81 MG Tablet Delayed Response, Take 81 mg by mouth every day., Disp: , Rfl:     ** I have documented what I find to be significant in regards to past medical, social, family and surgical history  in my HPI or under PMH/PSH/FH review section, otherwise it is noncontributory **           HPI    Review of Systems   Constitutional: Positive for fever.   Respiratory: Positive for shortness of breath.    All other systems reviewed and are negative.             Objective     BP (!) 150/64   Pulse (!) 123   Temp 37.2 °C (99 °F) (Temporal)   Resp 20   Ht 1.727 m (5' 8\")   Wt 70.3 kg (155 lb)   SpO2 95%   BMI 23.57 kg/m²      My Recheck , pulse ox 98%    Physical Exam  Vitals and nursing note reviewed.   Constitutional:       General: He is not in acute distress.     Appearance: Normal appearance. He is well-developed.   HENT:      Head: Normocephalic.      Mouth/Throat:      Mouth: Mucous membranes are moist.      Pharynx: Oropharynx is clear.   Cardiovascular:      Heart sounds: Normal heart sounds. No murmur heard.  Pulmonary:      Effort: Pulmonary effort is normal. No respiratory distress.      Breath sounds: Wheezing ( mild exp) present. "   Neurological:      Mental Status: He is alert.      Motor: No abnormal muscle tone.   Psychiatric:         Mood and Affect: Mood normal.         Behavior: Behavior normal.         Assessment & Plan       1. Cough  DX-CHEST-2 VIEWS    CoV-2 and Flu A/B by PCR (24 hour In-House): Collect NP swab in VTM   2. Tachycardia  EKG - Clinic Performed    REFERRAL TO CARDIOLOGY   3. Viral illness  fluticasone (FLONASE) 50 MCG/ACT nasal spray    POCT SARS-COV Antigen GRZEGORZ (Symptomatic only)   4. COPD with exacerbation (HCC)  doxycycline (VIBRAMYCIN) 100 MG Tab    dexamethasone (DECADRON) 6 MG Tab     Xray: no acute findings by MY READ. RADIOLOGY READ PENDING.   EKG: Sinus, rate 103, no acute changes       ** viral illness rapid c19 neg, will await PCR.     - Dx, plan & d/c instructions discussed   - self isolate until Covid PCR results known  - Rest, stay hydrated, OTC Motrin and/or Tylenol as needed  - elevated HR and ? Atrial enlargement on EKG-> recheck w/ PCP and/or cardiology advised  - E.R. precautions discussed     Asked to kindly follow up with their PCP's office for a recheck on today's visit, ER if not improving in 2-3 days or if feeling/getting worse. If you do not have a primary care doctor and need to schedule one you may call [facility] at [phone] to do this.    Any realistic side effects of medications that may have been given today reviewed.     Patient left in stable condition     POCT results reviewed/discussed    Today's radiology imaging personally reviewed by me today on day of visit and Radiology readings reviewed and discussed w/ patient today.     Pertinent prior lab work and/or imaging studies in Epic have been reviewed by me today on day of this visit.      Pertinent prior office visit notes in [facility] have been reviewed by me today on day of this visit.

## 2022-08-08 DIAGNOSIS — R05.9 COUGH: ICD-10-CM

## 2022-08-18 ENCOUNTER — DOCUMENTATION (OUTPATIENT)
Dept: VASCULAR LAB | Facility: MEDICAL CENTER | Age: 67
End: 2022-08-18
Payer: COMMERCIAL

## 2022-08-18 NOTE — PROGRESS NOTES
Called pt to remind that the  Carotid duplex   is due for surveillance. Scheduling office and our office phone numbers provided. AMA HuizarN

## 2022-08-20 ENCOUNTER — OFFICE VISIT (OUTPATIENT)
Dept: URGENT CARE | Facility: PHYSICIAN GROUP | Age: 67
End: 2022-08-20
Payer: MEDICARE

## 2022-08-20 VITALS
TEMPERATURE: 98.1 F | WEIGHT: 155 LBS | SYSTOLIC BLOOD PRESSURE: 118 MMHG | DIASTOLIC BLOOD PRESSURE: 52 MMHG | HEART RATE: 81 BPM | HEIGHT: 68 IN | OXYGEN SATURATION: 96 % | BODY MASS INDEX: 23.49 KG/M2 | RESPIRATION RATE: 16 BRPM

## 2022-08-20 DIAGNOSIS — J22 ACUTE LOWER RESPIRATORY TRACT INFECTION: ICD-10-CM

## 2022-08-20 DIAGNOSIS — J44.1 COPD EXACERBATION (HCC): ICD-10-CM

## 2022-08-20 LAB
EXTERNAL QUALITY CONTROL: NORMAL
FLUAV+FLUBV AG SPEC QL IA: NEGATIVE
INT CON NEG: NORMAL
INT CON POS: NORMAL
SARS-COV+SARS-COV-2 AG RESP QL IA.RAPID: NEGATIVE

## 2022-08-20 PROCEDURE — 99214 OFFICE O/P EST MOD 30 MIN: CPT | Mod: CS | Performed by: NURSE PRACTITIONER

## 2022-08-20 PROCEDURE — 87804 INFLUENZA ASSAY W/OPTIC: CPT | Performed by: NURSE PRACTITIONER

## 2022-08-20 PROCEDURE — 87426 SARSCOV CORONAVIRUS AG IA: CPT | Performed by: NURSE PRACTITIONER

## 2022-08-20 RX ORDER — PREDNISONE 10 MG/1
20 TABLET ORAL 2 TIMES DAILY
Qty: 20 TABLET | Refills: 0 | Status: SHIPPED | OUTPATIENT
Start: 2022-08-20 | End: 2022-08-25

## 2022-08-20 RX ORDER — AMOXICILLIN AND CLAVULANATE POTASSIUM 875; 125 MG/1; MG/1
1 TABLET, FILM COATED ORAL 2 TIMES DAILY
Qty: 10 TABLET | Refills: 0 | Status: SHIPPED | OUTPATIENT
Start: 2022-08-20 | End: 2022-08-25

## 2022-08-20 RX ORDER — AZITHROMYCIN 250 MG/1
TABLET, FILM COATED ORAL
Qty: 6 TABLET | Refills: 0 | Status: SHIPPED | OUTPATIENT
Start: 2022-08-20 | End: 2023-01-13

## 2022-08-20 ASSESSMENT — FIBROSIS 4 INDEX: FIB4 SCORE: 1.98

## 2022-08-20 NOTE — PROGRESS NOTES
Chief Complaint   Patient presents with    Fever     101 last night     Congestion    Cough       HISTORY OF PRESENT ILLNESS: Patient is a pleasant 67 y.o. male with a history of COPD who presents today due to symptoms which started yesterday.  Since yesterday he has had chest congestion, cough, wheezing, and a fever, T-max 102.  He reports similar symptoms 2 weeks ago.  Was seen in urgent care at that time was placed on doxycycline.  He reports improvement of the symptoms with again return of his symptoms yesterday.      Patient Active Problem List    Diagnosis Date Noted    History of TIA (transient ischemic attack) 08/28/2020    Other hyperlipidemia 08/28/2020    Former smoker 08/28/2020    Carotid artery stenosis, asymptomatic, right 08/28/2020    Intercostal pain, R side, musculoskeletal chondritis 04/20/2020    Asthma without status asthmaticus 03/07/2020    COPD (chronic obstructive pulmonary disease) (HCC) 03/07/2020    Benign prostatic hyperplasia with urinary obstruction 03/03/2020    Incomplete emptying of bladder 03/03/2020    Lesion of penis 03/03/2020    Nocturia 03/03/2020    Raised prostate specific antigen 03/03/2020    HTN (hypertension) 11/22/2018       Allergies:Patient has no known allergies.    Current Outpatient Medications Ordered in Epic   Medication Sig Dispense Refill    amoxicillin-clavulanate (AUGMENTIN) 875-125 MG Tab Take 1 Tablet by mouth 2 times a day for 5 days. 10 Tablet 0    azithromycin (ZITHROMAX) 250 MG Tab Take 2 tablets (500 mg) PO on day 1 and then take 1 tablet (250 mg) daily on 2-5 6 Tablet 0    predniSONE (DELTASONE) 10 MG Tab Take 2 Tablets by mouth 2 times a day for 5 days. Take with food. 20 Tablet 0    amLODIPine (NORVASC) 10 MG Tab Take 20 mg by mouth every day.      tadalafil (CIALIS) 20 MG tablet tadalafil 20 mg tablet   Take 1 tablet every day by oral route as needed.      omeprazole (PRILOSEC) 40 MG delayed-release capsule omeprazole 40 mg capsule,delayed release       finasteride (PROSCAR) 5 MG Tab       cyclobenzaprine (FLEXERIL) 10 mg Tab cyclobenzaprine 10 mg tablet      fluticasone (FLONASE) 50 MCG/ACT nasal spray Administer 2 Sprays into affected nostril(S) at bedtime. 16 g 1    amlodipine-benazepril (LOTREL) 5-20 MG per capsule TAKE ONE CAPSULE BY MOUTH EVERY DAY 30 Capsule 5    esomeprazole (NEXIUM) 40 MG delayed-release capsule       Fluticasone Furoate-Vilanterol (BREO ELLIPTA) 100-25 MCG/INH AEROSOL POWDER, BREATH ACTIVATED Inhale 1 Puff every day. Rinse mouth after use. 1 Each 0    rosuvastatin (CRESTOR) 20 MG Tab Take 1 Tablet by mouth every day. 90 Tablet 3    tamsulosin (FLOMAX) 0.4 MG capsule       aspirin EC (ECOTRIN) 81 MG Tablet Delayed Response Take 81 mg by mouth every day.      albuterol 108 (90 Base) MCG/ACT Aero Soln inhalation aerosol Inhale 2 Puffs by mouth every 6 hours as needed for Shortness of Breath. 1 Each 2     No current Epic-ordered facility-administered medications on file.       Past Medical History:   Diagnosis Date    Arthritis     Asthma without status asthmaticus 3/7/2020    Bronchitis     Carotid artery stenosis, asymptomatic, right 2020    Cataract     Chest pain     Chest tightness     Constipation     COPD (chronic obstructive pulmonary disease) ([facility]) 3/7/2020    Frequent urination     [place]acedonian measles     Hearing difficulty     Heartburn     Hypertension     Painful joint     Shortness of breath     Sweat, sweating, excessive     Vision loss     Wears glasses        Social History     Tobacco Use    Smoking status: Former     Packs/day: 1.00     Years: 40.00     Pack years: 40.00     Types: Cigarettes     Start date: 1980     Quit date: 2019     Years since quitting: 3.4    Smokeless tobacco: Never   Vaping Use    Vaping Use: Never used   Substance Use Topics    Alcohol use: Yes     Comment: moderately     Drug use: No       Family Status   Relation Name Status    Mo      Fa       Family History  "  Problem Relation Age of Onset    Heart Disease Mother     Heart Disease Father        ROS:  Review of Systems   Constitutional: Positive for subjective fever, chills, fatigue, malaise. Negative for weight loss.  HENT: Negative for congestion, ear pressure, and sore throat. Negative for ear pain, nosebleeds, and neck pain.    Eyes: Negative for vision changes.   Cardiovascular: Negative for chest pain, palpitations, orthopnea and leg swelling.   Respiratory: Positive for cough and sputum production, shortness of breath and wheezing.   Gastrointestinal: Negative for abdominal pain, nausea, vomiting or diarrhea.   Skin: Negative for rash, diaphoresis.     Exam:  /52   Pulse 81   Temp 36.7 °C (98.1 °F) (Temporal)   Resp 16   Ht 1.727 m (5' 8\")   Wt 70.3 kg (155 lb)   SpO2 96%   General: well-nourished, well-developed male in NAD  Head: normocephalic, atraumatic  Eyes: PERRLA, EOM within normal limits, no conjunctival injection, no scleral icterus, visual fields and acuity grossly intact.  Ears: normal shape and symmetry, no tenderness, no discharge. External canals are without any significant edema or erythema. Tympanic membranes are without any inflammation, no effusion. Gross auditory acuity is intact.  Nose: symmetrical without tenderness, mild discharge, erythema present bilateral nares.  Mouth/Throat: reasonable hygiene, no exudates or tonsillar enlargement. Mild erythema present.   Neck: no masses, range of motion within normal limits, no tracheal deviation.  Lymph: mild cervical adenopathy. No supraclavicular adenopathy.   Neuro: alert and oriented. Cranial nerves 1-12 grossly intact.   Cardiovascular: regular rate and rhythm without murmurs, rubs, or gallops. No edema.   Pulmonary: no distress. Chest is symmetrical with respiration. No wheezes, crackles.  Fine rhonchi at bases.  Musculoskeletal: appropriate muscle tone, gait is stable.  Skin: warm, dry, intact, no clubbing, no cyanosis.   Psych: " appropriate mood, affect, judgement.       POC COVID-negative    POC flu negative      Assessment/Plan:  1. Acute lower respiratory tract infection  POCT SARS-COV Antigen GRZEGORZ (Symptomatic only)    amoxicillin-clavulanate (AUGMENTIN) 875-125 MG Tab    azithromycin (ZITHROMAX) 250 MG Tab      2. COPD exacerbation (HCC)  predniSONE (DELTASONE) 10 MG Tab              The patient is a pleasant 67-year-old who presents with respiratory symptoms.  Have discussed potential for new process versus recurrent infection.  At this time we will treat with dual antibiotic therapy.  Augmentin azithromycin as directed.  Prednisone as directed.  Rest, increase fluids, hand and respiratory hygiene.   May take OTC medications as directed for symptom relief.   Supportive care, differential diagnoses, and indications for immediate follow-up discussed with patient.   Pathogenesis of diagnosis discussed including typical length and natural progression.  Instructed to return to clinic or nearest emergency department for any change in condition, further concerns, or worsening of symptoms.  Patient states understanding of the plan of care and discharge instructions.  Instructed to make an appointment with his primary care provider in the next 3-5 days if not significantly improving and for further care.         Please note that this dictation was created using voice recognition software. I have made every reasonable attempt to correct obvious errors, but I expect that there are errors of grammar and possibly content that I did not discover before finalizing the note. Previous clinic visit encounter reviewed and considered in medical decision making today.         RODRI [employer].

## 2022-08-25 ENCOUNTER — TELEPHONE (OUTPATIENT)
Dept: SLEEP MEDICINE | Facility: MEDICAL CENTER | Age: 67
End: 2022-08-25
Payer: COMMERCIAL

## 2022-08-25 NOTE — TELEPHONE ENCOUNTER
8-25-22  Called off no show list for 8-17-22 to reschedule, no answer LVM.  Left call back for scheduling [phone].   ACM

## 2022-09-08 ENCOUNTER — DOCUMENTATION (OUTPATIENT)
Dept: VASCULAR LAB | Facility: MEDICAL CENTER | Age: 67
End: 2022-09-08
Payer: COMMERCIAL

## 2022-09-08 NOTE — PROGRESS NOTES
Certified letter sent to patient explaining that multiple calls have been made in attempts to contact them regarding their vascular surveillance studies. Requested patient call clinic to schedule test as failure to obtain these studies in a timely fashion can lead to significant medical consequences. Phone number to clinic provided in correspondence. We will wait for the patient to respond for any future correspondence.   RADAMES Huizar.

## 2022-09-08 NOTE — LETTER
September 8, 2022        Adrián Sheldon      We have been unsuccessfully trying to schedule you for vascular surveillance studies. You are due to have carotid ultrasound , which has been ordered but not yet scheduled.     As we have discussed, these studies are critical in helping us to determine if your vascular disease is progressing. Failure to obtain these studies in a timely fashion can lead to significant medical consequences.     Please call the [facility] scheduling department at [phone] to schedule these tests at your earliest convenience. If for any reason you are unable to schedule, please contact our office at [phone].     Best Regards,     [facility] Providers on behalf of   Michael J. Bloch, MD, Medical Director

## 2022-09-24 ENCOUNTER — OFFICE VISIT (OUTPATIENT)
Dept: URGENT CARE | Facility: PHYSICIAN GROUP | Age: 67
End: 2022-09-24
Payer: MEDICARE

## 2022-09-24 ENCOUNTER — HOSPITAL ENCOUNTER (OUTPATIENT)
Facility: MEDICAL CENTER | Age: 67
End: 2022-09-24
Attending: FAMILY MEDICINE
Payer: MEDICARE

## 2022-09-24 VITALS
HEART RATE: 67 BPM | OXYGEN SATURATION: 98 % | WEIGHT: 168 LBS | TEMPERATURE: 97.1 F | SYSTOLIC BLOOD PRESSURE: 118 MMHG | BODY MASS INDEX: 25.46 KG/M2 | DIASTOLIC BLOOD PRESSURE: 58 MMHG | RESPIRATION RATE: 16 BRPM | HEIGHT: 68 IN

## 2022-09-24 DIAGNOSIS — N30.00 ACUTE CYSTITIS WITHOUT HEMATURIA: ICD-10-CM

## 2022-09-24 LAB
APPEARANCE UR: NORMAL
BILIRUB UR STRIP-MCNC: NORMAL MG/DL
COLOR UR AUTO: YELLOW
GLUCOSE UR STRIP.AUTO-MCNC: 100 MG/DL
KETONES UR STRIP.AUTO-MCNC: NORMAL MG/DL
LEUKOCYTE ESTERASE UR QL STRIP.AUTO: NORMAL
NITRITE UR QL STRIP.AUTO: POSITIVE
PH UR STRIP.AUTO: 5.5 [PH] (ref 5–8)
PROT UR QL STRIP: NORMAL MG/DL
RBC UR QL AUTO: NORMAL
SP GR UR STRIP.AUTO: <=1.005
UROBILINOGEN UR STRIP-MCNC: 0.2 MG/DL

## 2022-09-24 PROCEDURE — 99213 OFFICE O/P EST LOW 20 MIN: CPT | Performed by: FAMILY MEDICINE

## 2022-09-24 PROCEDURE — 81002 URINALYSIS NONAUTO W/O SCOPE: CPT | Performed by: FAMILY MEDICINE

## 2022-09-24 PROCEDURE — 87077 CULTURE AEROBIC IDENTIFY: CPT

## 2022-09-24 PROCEDURE — 87186 SC STD MICRODIL/AGAR DIL: CPT

## 2022-09-24 PROCEDURE — 87086 URINE CULTURE/COLONY COUNT: CPT

## 2022-09-24 RX ORDER — CIPROFLOXACIN 500 MG/1
500 TABLET, FILM COATED ORAL 2 TIMES DAILY
Qty: 14 TABLET | Refills: 0 | Status: SHIPPED | OUTPATIENT
Start: 2022-09-24 | End: 2022-10-01

## 2022-09-24 RX ORDER — SULFAMETHOXAZOLE AND TRIMETHOPRIM 800; 160 MG/1; MG/1
1 TABLET ORAL 2 TIMES DAILY
Qty: 10 TABLET | Refills: 0 | Status: SHIPPED | OUTPATIENT
Start: 2022-09-24 | End: 2022-09-24 | Stop reason: CLARIF

## 2022-09-24 ASSESSMENT — FIBROSIS 4 INDEX: FIB4 SCORE: 1.98

## 2022-09-24 NOTE — PROGRESS NOTES
Subjective:      CC:  presents with Dysuria            Dysuria   This is a new problem. The current episode started in the past wkThe problem occurs every urination. The problem has been unchanged. The quality of the pain is described as burning. There has been no fever. Pt is not sexually active. There is no history of pyelonephritis. Associated symptoms include frequency and urgency. Pertinent negatives include no chills, discharge, flank pain, nausea or vomiting. Pt has tried nothing for the symptoms. There is no history of recurrent UTIs.     Social History     Socioeconomic History    Marital status:      Spouse name: Not on file    Number of children: Not on file    Years of education: Not on file    Highest education level: Not on file   Occupational History    Not on file   Tobacco Use    Smoking status: Former     Packs/day: 1.00     Years: 40.00     Pack years: 40.00     Types: Cigarettes     Start date: 1/23/1980     Quit date: 2/25/2019     Years since quitting: 3.5    Smokeless tobacco: Never   Vaping Use    Vaping Use: Never used   Substance and Sexual Activity    Alcohol use: Yes     Comment: moderately     Drug use: No    Sexual activity: Not on file   Other Topics Concern    Not on file   Social History Narrative    Not on file     Social Determinants of Health     Financial Resource Strain: Not on file   Food Insecurity: Not on file   Transportation Needs: Not on file   Physical Activity: Not on file   Stress: Not on file   Social Connections: Not on file   Intimate Partner Violence: Not on file   Housing Stability: Not on file         Family History   Problem Relation Age of Onset    Heart Disease Mother     Heart Disease Father          No Known Allergies        Current Outpatient Medications on File Prior to Visit   Medication Sig Dispense Refill    amLODIPine (NORVASC) 10 MG Tab Take 20 mg by mouth every day.      tadalafil (CIALIS) 20 MG tablet tadalafil 20 mg tablet   Take 1 tablet  "every day by oral route as needed.      omeprazole (PRILOSEC) 40 MG delayed-release capsule omeprazole 40 mg capsule,delayed release      finasteride (PROSCAR) 5 MG Tab       cyclobenzaprine (FLEXERIL) 10 mg Tab cyclobenzaprine 10 mg tablet      fluticasone (FLONASE) 50 MCG/ACT nasal spray Administer 2 Sprays into affected nostril(S) at bedtime. 16 g 1    amlodipine-benazepril (LOTREL) 5-20 MG per capsule TAKE ONE CAPSULE BY MOUTH EVERY DAY 30 Capsule 5    esomeprazole (NEXIUM) 40 MG delayed-release capsule       Fluticasone Furoate-Vilanterol (BREO ELLIPTA) 100-25 MCG/INH AEROSOL POWDER, BREATH ACTIVATED Inhale 1 Puff every day. Rinse mouth after use. 1 Each 0    rosuvastatin (CRESTOR) 20 MG Tab Take 1 Tablet by mouth every day. 90 Tablet 3    tamsulosin (FLOMAX) 0.4 MG capsule       aspirin EC (ECOTRIN) 81 MG Tablet Delayed Response Take 81 mg by mouth every day.      albuterol 108 (90 Base) MCG/ACT Aero Soln inhalation aerosol Inhale 2 Puffs by mouth every 6 hours as needed for Shortness of Breath. 1 Each 2    azithromycin (ZITHROMAX) 250 MG Tab Take 2 tablets (500 mg) PO on day 1 and then take 1 tablet (250 mg) daily on 2-5 (Patient not taking: Reported on 9/24/2022) 6 Tablet 0     No current facility-administered medications on file prior to visit.       Review of Systems   Constitutional: Negative for chills.   Respiratory: Negative for shortness of breath.    Cardiovascular: Negative for chest pain.   Gastrointestinal: Negative for nausea, vomiting and abdominal pain.   Genitourinary: Positive for dysuria, urgency and frequency. Negative for flank pain.   Skin: Negative for rash.   Neurological: Negative for dizziness and headaches.   All other systems reviewed and are negative.         Objective:      /58   Pulse 67   Temp 36.2 °C (97.1 °F) (Temporal)   Resp 16   Ht 1.727 m (5' 8\")   Wt 76.2 kg (168 lb)   SpO2 98%       Physical Exam   Constitutional: pt is oriented to person, place, and time. Pt " appears well-developed and well-nourished. No distress.   HENT:   Head: Normocephalic and atraumatic.   Mouth/Throat: Mucous membranes are normal.   Eyes: Conjunctivae and EOM are normal. Pupils are equal, round, and reactive to light. Right eye exhibits no discharge. Left eye exhibits no discharge. No scleral icterus.   Neck: Normal range of motion. Neck supple.   Cardiovascular: Normal rate, regular rhythm, normal heart sounds and intact distal pulses.    No murmur heard.  Pulmonary/Chest: Effort normal and breath sounds normal. No respiratory distress. Pt has no wheezes,  rales.   Abdominal: Bowel sounds are normal. Pt exhibits no distension and no mass. There is no tenderness. There is no rebound, no guarding and no CVA tenderness.   Neurological: pt is alert and oriented to person, place, and time.   Skin: Skin is warm and dry.   Psychiatric: behavior is normal.   Nursing note and vitals reviewed.           No results found for: POCCOLOR, POCAPPEAR, POCLEUKEST, POCNITRITE, POCUROBILIGE, POCPROTEIN, POCURPH, POCBLOOD, POCSPGRV, POCKETONES, POCBILIRUBIN, POCGLUCUA         Assessment/Plan:     1. Acute cystitis without hematuria   UA findings c/w infection   - URINE CULTURE(NEW); Future  - ciprofloxacin (CIPRO) 500 MG Tab; Take 1 Tablet by mouth 2 times a day for 7 days.  Dispense: 14 Tablet; Refill: 0       Differential diagnosis, natural history, supportive care, and indications for immediate follow-up discussed. All questions answered. Patient agrees with the plan of care.     Follow-up as needed if symptoms worsen or fail to improve to PCP, Urgent care or Emergency Room.     I have personally reviewed prior external notes and test results pertinent to today's visit.  I have independently reviewed and interpreted all diagnostics ordered during this urgent care acute visit.

## 2022-09-26 LAB
BACTERIA UR CULT: ABNORMAL
BACTERIA UR CULT: ABNORMAL
SIGNIFICANT IND 70042: ABNORMAL
SITE SITE: ABNORMAL
SOURCE SOURCE: ABNORMAL

## 2022-09-28 ENCOUNTER — DOCUMENTATION (OUTPATIENT)
Dept: VASCULAR LAB | Facility: MEDICAL CENTER | Age: 67
End: 2022-09-28
Payer: COMMERCIAL

## 2022-09-30 NOTE — RESULT ENCOUNTER NOTE
Urine cx positive for e. Coli      Plan:    Advise pt to finish course cipro and f/u if no improvement

## 2022-10-05 ENCOUNTER — APPOINTMENT (OUTPATIENT)
Dept: RADIOLOGY | Facility: MEDICAL CENTER | Age: 67
End: 2022-10-05
Attending: NURSE PRACTITIONER
Payer: MEDICARE

## 2022-11-10 DIAGNOSIS — J45.20 MILD INTERMITTENT REACTIVE AIRWAY DISEASE WITHOUT COMPLICATION: ICD-10-CM

## 2022-11-10 NOTE — TELEPHONE ENCOUNTER
Have we ever prescribed this med? Yes.  If yes, what date? 3/28/2022    Last OV: 3/28/2022 TIMOTHY Mathur MD     Next OV: No appt on file; pt was due back 6/28/2022     DX: Mild intermittent reactive airway disease without complication (J45.20)    Medications: BREO ELLIPTA 100-25 MCG/ACT AEROSOL POWDER, BREATH ACTIVATED

## 2022-11-11 RX ORDER — FLUTICASONE FUROATE AND VILANTEROL TRIFENATATE 100; 25 UG/1; UG/1
POWDER RESPIRATORY (INHALATION)
Qty: 60 EACH | Refills: 0 | Status: SHIPPED
Start: 2022-11-11 | End: 2023-02-07

## 2023-01-10 ENCOUNTER — APPOINTMENT (OUTPATIENT)
Dept: VASCULAR LAB | Facility: MEDICAL CENTER | Age: 68
End: 2023-01-10
Payer: MEDICARE

## 2023-01-13 ENCOUNTER — OFFICE VISIT (OUTPATIENT)
Dept: VASCULAR LAB | Facility: MEDICAL CENTER | Age: 68
End: 2023-01-13
Attending: NURSE PRACTITIONER
Payer: COMMERCIAL

## 2023-01-13 VITALS
HEIGHT: 68 IN | HEART RATE: 60 BPM | SYSTOLIC BLOOD PRESSURE: 142 MMHG | BODY MASS INDEX: 23.95 KG/M2 | WEIGHT: 158 LBS | DIASTOLIC BLOOD PRESSURE: 64 MMHG

## 2023-01-13 DIAGNOSIS — Z00.00 ROUTINE HEALTH MAINTENANCE: ICD-10-CM

## 2023-01-13 DIAGNOSIS — E78.49 OTHER HYPERLIPIDEMIA: ICD-10-CM

## 2023-01-13 DIAGNOSIS — R73.01 IMPAIRED FASTING GLUCOSE: ICD-10-CM

## 2023-01-13 DIAGNOSIS — Z79.02 ANTIPLATELET OR ANTITHROMBOTIC LONG-TERM USE: ICD-10-CM

## 2023-01-13 DIAGNOSIS — R00.0 RACING HEART BEAT: ICD-10-CM

## 2023-01-13 DIAGNOSIS — I10 PRIMARY HYPERTENSION: ICD-10-CM

## 2023-01-13 DIAGNOSIS — I65.21 CAROTID ARTERY STENOSIS, ASYMPTOMATIC, RIGHT: ICD-10-CM

## 2023-01-13 PROCEDURE — 99214 OFFICE O/P EST MOD 30 MIN: CPT

## 2023-01-13 PROCEDURE — 99212 OFFICE O/P EST SF 10 MIN: CPT

## 2023-01-13 RX ORDER — TIZANIDINE 2 MG/1
TABLET ORAL
COMMUNITY
End: 2023-01-13

## 2023-01-13 RX ORDER — PREDNISONE 10 MG/1
TABLET ORAL
COMMUNITY
End: 2023-01-13

## 2023-01-13 RX ORDER — MELOXICAM 15 MG/1
TABLET ORAL
COMMUNITY
Start: 2022-11-10 | End: 2023-01-13

## 2023-01-13 RX ORDER — OMEPRAZOLE 20 MG/1
CAPSULE, DELAYED RELEASE ORAL
COMMUNITY
End: 2023-01-13

## 2023-01-13 RX ORDER — HYDROCODONE BITARTRATE AND ACETAMINOPHEN 10; 325 MG/1; MG/1
TABLET ORAL
COMMUNITY
End: 2023-01-13

## 2023-01-13 RX ORDER — DOXYCYCLINE HYCLATE 100 MG
TABLET ORAL
COMMUNITY
End: 2023-01-13

## 2023-01-13 RX ORDER — CLOTRIMAZOLE 10 MG/1
LOZENGE ORAL; TOPICAL
COMMUNITY
End: 2023-01-13

## 2023-01-13 RX ORDER — ROSUVASTATIN CALCIUM 20 MG/1
20 TABLET, COATED ORAL
Qty: 90 TABLET | Refills: 3 | Status: SHIPPED | OUTPATIENT
Start: 2023-01-13 | End: 2024-01-02

## 2023-01-13 RX ORDER — CIPROFLOXACIN 500 MG/1
TABLET, FILM COATED ORAL
COMMUNITY
End: 2023-01-13

## 2023-01-13 RX ORDER — PANTOPRAZOLE SODIUM 40 MG/1
TABLET, DELAYED RELEASE ORAL
COMMUNITY
End: 2023-01-13

## 2023-01-13 ASSESSMENT — ENCOUNTER SYMPTOMS
CHILLS: 0
BLOOD IN STOOL: 0
HEMOPTYSIS: 0
SPEECH CHANGE: 0
DEPRESSION: 0
FEVER: 0
MYALGIAS: 0
COUGH: 0
NERVOUS/ANXIOUS: 0
WEAKNESS: 0
FOCAL WEAKNESS: 0
DOUBLE VISION: 0
CLAUDICATION: 0
SHORTNESS OF BREATH: 0
DIZZINESS: 0
PALPITATIONS: 0
ORTHOPNEA: 0
BRUISES/BLEEDS EASILY: 0
HEADACHES: 0
BLURRED VISION: 0

## 2023-01-13 ASSESSMENT — FIBROSIS 4 INDEX: FIB4 SCORE: 1.98

## 2023-01-13 NOTE — PROGRESS NOTES
"  FOLLOW UP VASCULAR VISIT  Subjective:   Adrián Sheldon is a 67 y.o. y.o. male  who presents today 04/13/2022 for   Chief Complaint   Patient presents with    Follow-Up       initially referred by Beatrice Ireland P.Ac for eval and med mgmt of carotid artery disease     HPI:  Report he had carotids done about 2 months ago, he thinks at [facility]  No recent labs  Has gained some weight  Has been having some episodes of \"racing heart\", report only happens when he lays down and starts \"thinking,\" typically lasts about 1 minute, denies dizziness/lightheadedness or irregularity with heart rate.  Thinks it mainly happens when he is anxious about something.    Does not have a PCP he sees regularly.  Needs cholesterol medicine refilled  Does not have a home BP monitor       Carotid artery disease:   No current symptoms,   denies CVA/TIA like symptoms  Did have carotid US done, anxious to know results    Carotid done at [facility]? - will request results    HTN:  Current HTN concerns: Denies   Current ADRs: No  HTN sx:  No current blurred or changed vision, chest pain, shortness of breath, headache, nausea, dizziness/vertigo   Home BP log: not checking does not have a home monitor  24h ABPM completed: not tested  Adherence to current HTN meds: compliant all of the time    Hyperlipidemia:    Tolerating rosuvastatin   Current treatment: none   Myalgias? No  Other adverse drug reactions? No  Last lipid profile: 6/2022    Antiplatelet/anticoagulation:   Yes, Details: ASA 81mg daily  ,   no bleeding noted       No past surgical history on file.   Current Outpatient Medications on File Prior to Visit   Medication Sig Dispense Refill    amlodipine-benazepril (LOTREL) 5-20 MG per capsule TAKE ONE CAPSULE BY MOUTH EVERY DAY 30 Capsule 1    BREO ELLIPTA 100-25 MCG/ACT AEROSOL POWDER, BREATH ACTIVATED INHALE ONE PUFF BY MOUTH EVERY DAY, RINSE MOUTH AFTER USE 60 Each 0    tadalafil (CIALIS) 20 MG tablet tadalafil 20 mg tablet   Take 1 tablet " every day by oral route as needed.      aspirin EC (ECOTRIN) 81 MG Tablet Delayed Response Take 81 mg by mouth every day.      albuterol 108 (90 Base) MCG/ACT Aero Soln inhalation aerosol Inhale 2 Puffs by mouth every 6 hours as needed for Shortness of Breath. 1 Each 2     No current facility-administered medications on file prior to visit.     No Known Allergies     Social History     Tobacco Use    Smoking status: Former     Packs/day: 1.00     Years: 40.00     Pack years: 40.00     Types: Cigarettes     Start date: 1/23/1980     Quit date: 2/25/2019     Years since quitting: 3.8    Smokeless tobacco: Never   Vaping Use    Vaping Use: Never used   Substance Use Topics    Alcohol use: Yes     Comment: moderately     Drug use: No     DIET AND EXERCISE:  Weight Change:stable   BMI Readings from Last 5 Encounters:   01/13/23 24.02 kg/m²   09/24/22 25.54 kg/m²   08/20/22 23.57 kg/m²   08/07/22 23.57 kg/m²   07/19/22 23.42 kg/m²      Diet: common adult  Exercise: no regular exercise program     Review of Systems   Constitutional:  Negative for chills, fever and malaise/fatigue.   HENT:  Negative for nosebleeds.    Eyes:  Negative for blurred vision and double vision.   Respiratory:  Negative for cough, hemoptysis and shortness of breath.    Cardiovascular:  Negative for chest pain, palpitations, orthopnea, claudication and leg swelling.   Gastrointestinal:  Negative for blood in stool and melena.   Genitourinary:  Negative for hematuria.   Musculoskeletal:  Negative for joint pain and myalgias.   Neurological:  Negative for dizziness, speech change, focal weakness, weakness and headaches.   Endo/Heme/Allergies:  Does not bruise/bleed easily.   Psychiatric/Behavioral:  Negative for depression. The patient is not nervous/anxious.       Objective:     Vitals:    01/13/23 1050 01/13/23 1059   BP: 135/64 (!) 142/64   BP Location: Left arm Left arm   Patient Position: Sitting Sitting   BP Cuff Size: Adult Adult   Pulse: 62 60  "  Weight: 71.7 kg (158 lb)    Height: 1.727 m (5' 8\")       BP Readings from Last 5 Encounters:   01/13/23 (!) 142/64   09/24/22 118/58   08/20/22 118/52   08/07/22 (!) 150/64   07/19/22 (!) 142/60      Body mass index is 24.02 kg/m².  Physical Exam  Vitals reviewed.   Constitutional:       Appearance: Normal appearance.   HENT:      Head: Normocephalic and atraumatic.   Eyes:      General: No scleral icterus.     Conjunctiva/sclera: Conjunctivae normal.   Cardiovascular:      Rate and Rhythm: Normal rate and regular rhythm.      Pulses:           Posterior tibial pulses are 2+ on the right side and 2+ on the left side.      Heart sounds: Normal heart sounds. No murmur heard.     Comments:    Pulmonary:      Effort: Pulmonary effort is normal. No respiratory distress.      Breath sounds: Normal breath sounds. No wheezing or rales.   Musculoskeletal:      Right lower leg: No edema.      Left lower leg: No edema.   Skin:     General: Skin is warm and dry.      Coloration: Skin is not pale.   Neurological:      General: No focal deficit present.      Mental Status: He is alert and oriented to person, place, and time. Mental status is at baseline.      Gait: Gait normal.   Psychiatric:         Mood and Affect: Mood normal.         Behavior: Behavior normal.         Thought Content: Thought content normal.     Lab Results   Component Value Date    CHOLSTRLTOT 121 06/07/2022    LDL 60 06/07/2022    HDL 46 06/07/2022    TRIGLYCERIDE 73 06/07/2022      Lab Results   Component Value Date    SODIUM 138 06/07/2022    POTASSIUM 4.7 06/07/2022    CHLORIDE 103 06/07/2022    CO2 26 06/07/2022    GLUCOSE 133 (H) 06/07/2022    BUN 14 06/07/2022    CREATININE 0.78 06/07/2022    IFAFRICA >60 10/28/2021    IFNOTAFR >60 10/28/2021        Lab Results   Component Value Date    WBC 5.5 06/07/2022    RBC 4.95 06/07/2022    HEMOGLOBIN 15.8 06/07/2022    HEMATOCRIT 45.7 06/07/2022    MCV 92.3 06/07/2022    MCH 31.9 06/07/2022    MCHC 34.6 " 2022    MPV 10.6 2022     VASCULAR IMAGING:   Last EKG:   Results for orders placed or performed during the hospital encounter of 22   EKG - Clinic Performed   Result Value Ref Range    Report       [facility]    Test Date:  2022  Pt Name:    REJI MAYA              Department: EKG  MRN:        [id number]                      Room:  Gender:     Male                         Technician: [facility]  :        [id number]                   Requested By:DOMONIQUE GARIBAY  Order #:    [id number]                    Reading MD: Nhan Houser MD    Measurements  Intervals                                Axis  Rate:       53                           P:          53  SD:         152                          QRS:        67  QRSD:       92                           T:          38  QT:         424  QTc:        399    Interpretive Statements  SINUS BRADYCARDIA  Compared to ECG 2020 20:50:56  Sinus rhythm no longer present  Electronically Signed On 2022 14:51:54 PDT by Nhan Houser MD       CTA neck 2018  Unremarkable CT angiogram of the neck. No high-grade stenosis, large vessel occlusion, aneurysm or dissection.    CTA head 2018   1.  No large vessel occlusion, high-grade stenosis or aneurysm of the [place] of [place].  2.  No CT evidence of acute infarct, hemorrhage or mass.  1.  Cerebral blood flow less than 30% likely representing completed infarct = 0 mL.   2.  T Max more than 6 seconds likely representing combination of completed infarct and ischemia = 0 mL.   3.  Mismatched volume likely representing ischemic brain/penumbra = 0 mL.   4.  Please note that the cerebral perfusion was performed on the limited brain tissue around the basal ganglia region. Infarct/ischemia outside the CT perfusion sections can be missed in this study.    MRI brain    1.  No acute abnormality.  2.  Mild cerebral atrophy.  3.  Minimal chronic microvascular ischemic disease.    MPI 3/2020    NUCLEAR IMAGING INTERPRETATION   No evidence of significant jeopardized viable myocardium or prior myocardial    infarction.   Normal left ventricular size, ejection fraction, and wall motion.    Echo 3/2020  Normal left ventricular systolic function. Left ventricular ejection   fraction is visually estimated to be 60%.   Normal diastolic function.  Normal inferior vena cava size without inspiratory collapse.    CT chest 5/2020  1.  There is no lung mass or destructive bony process to explain the patient's right chest wall pain.  2.  There is a nonspecific 4 mm right upper lobe lung nodule, probably an area of scarring.  3.  There is a calcified granuloma in the left lower lobe with small calcified mediastinal nodes consistent with prior granulomatous inflammatory process.  4.  There is aortic atherosclerosis.    Carotid duplex 7/15/2020 ([place])  Moderate 40-69% R ICA ,  No stenosis L ICA   bilat moderate atherosclerotic changes     Carotid duplex 7/13/21  Mildly elevated velocity in the right proximal ICA may suggest 50-69%    stenosis.    Medical Decision Making:  Today's Assessment / Status / Plan:     1. Carotid artery stenosis, asymptomatic, right  rosuvastatin (CRESTOR) 20 MG Tab      2. Other hyperlipidemia  rosuvastatin (CRESTOR) 20 MG Tab    Lipid Profile    Comp Metabolic Panel      3. Routine health maintenance  Referral to establish with [facility]CP      4. Primary hypertension  Comp Metabolic Panel    MICROALBUMIN CREAT RATIO URINE      5. Impaired fasting glucose  HEMOGLOBIN A1C (Glycohemoglobin GHB Total/A1C with MBG Estimate)      6. Antiplatelet or antithrombotic long-term use  CBC WITHOUT DIFFERENTIAL      7. Racing heart beat          Patient Type: Primary Prevention    Etiology of Established CVD if Present:   1) R ICA stenosis, moderate   2) aortic athero  3) TIA, 11/2018    Antithrombotic therapy:  Indication: carotid stenosis   Anti-Platelet/Anti-Coagulant Tx: yes  - continue ASA 81mg  daily     Lipid Management: Qualifies for Statin Therapy Based on 2018 ACC/AHA Guidelines: yes  Calculated 10-Year Risk of ASCVD (if LDL <189, no CKD G3b/4/5): N/A  Currently on Statin: rosuvastatin 20mg   NLA/ACC/AHA risk category:   High:  Carotid disease, 3+ major risk factors    Tx threshold: non-HDL-C >129, LDL-C >99  Tx goal:  non-HDL <130, LDL-C <100 (optional LDLc <70)  At goal:  Yes 10/2021, 6/2022  Plan:  - reinforced ongoing TLC measures   - update labs prior to next appt   - Continue rosuvastatin 20mg daily with vit D3 5,000 units daily     Blood Pressure Management: Goal: ACC/AHA (2017) goal <130/80  Home BP at goal:  Not checking   Office BP at goal:  NO  Inidications of end organ damage:   Echo/EKG: N/A    UACR: pending  (ordered 1/2023)  Renal parenchymal disease:  normal   Ophthalmo: N/A      Plan:   Monitoring:   - start/continue home BP monitoring, reviewed correct technique, provide BP log and instructions - reviewed and strongly recommended he get home monitor to check BP.    - order 24h ABPM:  not at this time, but may consider in future if to rule out whitecoat HTN or if pt not compliant with home BP measurements  - monitor lytes/gfr routinely and microalbumin prior to next appt  - contact office if BP consistently >140/>90 to discussion of tx adjustments   Medications:  ACEi/ARB: continue benazepril to 20mg QHS (combo)  DHP-CCB: continue amlodipine to 5mg QHS (combo)  Thiazide: none may consider as next agent  Valerio-receptor Antagonist: not indicated at this time     Glycemic Status: prediabetic A1c 6.0 7/2022  Discussed and reviewed with pt.  Pt to limit sugars and simple carbohydrates and focus on eating more complex carbohydrates.  Encourage following a healthy diet like Mediterranean or DASH diet.    - May consider starting metformin at next appt if A1c approaching diagnostic cutoff for DM type 2.  -may consider referral to dietitian in future if pt agreeable  - recheck A1c prior to next  appt    Smoking: maintenance stage - quit 4-6 years ago approx 2018    reports that he quit smoking about 3 years ago. His smoking use included cigarettes. He started smoking about 43 years ago. He has a 40.00 pack-year smoking history. He has never used smokeless tobacco.   Provided strong recommendation for complete cessation and informed this is the primary contributor to the majority of all ASCVD and cancer-related conditions and can result in significant morbidity and early mortality.   - reviewed resources for cessation including tobacco cessation clinic visit, pharmacotx meds, quit lines  - review at every visit      Physical Activity: continue healthy activity to improve CV fitness, see care instructions for additional details. Recommend 150 min/week of activity for cardiovascular health, including walking at a brisk pace or exercise at gym     Weight Management and Nutrition: Dietary plan was discussed with patient at this visit including DASH, low sodium and/or as outlined in care instructions, low fat      Other:   1) R ICA stenosis, moderate, no symptoms.   No indications or surgical intervention at this time   Carotid US done at [facility] November 2022 (pt thinks), will request results  - continue med mgmt   - update next duplex once results available to determine appropriate interval   anticipate 1 yr (November 2023?), consider CTA if worsening stenosis     2) TIA, episode of ataxia   Normal MRI, CTA head/neck   - continue med mgmt     3)  Palpitations - ongoing, new to me, pt describes episodes as racing heart, self limiting lasting about 1 min, usually associated with anxiety or stress.  Denies SOB, CP, dizziness/light headedness or any other associated symptoms.  This may be related to anxiety, but may consider Zio patch to rule out cardiac reasons in future if continues.  Recommend pt utilize deep breathing, meditation, and shiva like Calm to see if that helps.  Recommend follow up with PCP    4)   Establish with new PCP - pt requesting to establish with new PCP for preventative care.  Referral placed.      Instructed to follow-up with PCP for remainder of adult medical needs: yes  We will partner with other providers in the management of established vascular disease and cardiometabolic risk factors.    Studies to Be Obtained: carotid duplex 11/2023?? (Dependent on when last was done at [facility]) not ordered  Labs to Be Obtained: Lipid, CMP, cbc, a1c, microalbumin    Follow up in:  8 weeks review labs and BP      RODRI Dee.  [facility]   [facility]

## 2023-01-14 ENCOUNTER — OFFICE VISIT (OUTPATIENT)
Dept: URGENT CARE | Facility: PHYSICIAN GROUP | Age: 68
End: 2023-01-14
Payer: COMMERCIAL

## 2023-01-14 VITALS
DIASTOLIC BLOOD PRESSURE: 85 MMHG | RESPIRATION RATE: 15 BRPM | SYSTOLIC BLOOD PRESSURE: 138 MMHG | WEIGHT: 159 LBS | BODY MASS INDEX: 24.1 KG/M2 | HEIGHT: 68 IN | TEMPERATURE: 98.2 F | HEART RATE: 73 BPM | OXYGEN SATURATION: 94 %

## 2023-01-14 DIAGNOSIS — J44.1 COPD EXACERBATION (HCC): ICD-10-CM

## 2023-01-14 PROCEDURE — 99214 OFFICE O/P EST MOD 30 MIN: CPT | Performed by: FAMILY MEDICINE

## 2023-01-14 RX ORDER — METHYLPREDNISOLONE 4 MG/1
TABLET ORAL
Qty: 21 TABLET | Refills: 0 | Status: SHIPPED | OUTPATIENT
Start: 2023-01-14 | End: 2023-02-08

## 2023-01-14 RX ORDER — BENZONATATE 200 MG/1
200 CAPSULE ORAL 3 TIMES DAILY PRN
Qty: 45 CAPSULE | Refills: 0 | Status: SHIPPED | OUTPATIENT
Start: 2023-01-14 | End: 2023-02-08

## 2023-01-14 ASSESSMENT — FIBROSIS 4 INDEX: FIB4 SCORE: 1.98

## 2023-01-14 NOTE — PROGRESS NOTES
CC:  cough          Has COPD, previous smoker, quit 5 yr ago      Cough  This is a new problem. The current episode started 2 days ago. The problem has been unchanged. The problem occurs constantly. The cough is dry.        Denies fever, fatigue, muscle aches.        Pertinent negatives include no   headaches, nausea, vomiting, diarrhea, sweats, weight loss or wheezing. Nothing aggravates the symptoms.  Patient has tried nothing for the symptoms. There is no history of asthma.        Past Medical History:   Diagnosis Date    Carotid artery stenosis, asymptomatic, right 8/28/2020    Asthma without status asthmaticus 3/7/2020    COPD (chronic obstructive pulmonary disease) ([facility]) 3/7/2020    Arthritis     Bronchitis     Cataract     Chest pain     Chest tightness     Constipation     Frequent urination     [place] measles     Hearing difficulty     Heartburn     Hypertension     Painful joint     Shortness of breath     Sweat, sweating, excessive     Vision loss     Wears glasses          Social History     Tobacco Use    Smoking status: Former     Packs/day: 1.00     Years: 40.00     Pack years: 40.00     Types: Cigarettes     Start date: 1/23/1980     Quit date: 2/25/2019     Years since quitting: 3.8    Smokeless tobacco: Never   Vaping Use    Vaping Use: Never used   Substance Use Topics    Alcohol use: Yes     Comment: moderately     Drug use: No         Current Outpatient Medications on File Prior to Visit   Medication Sig Dispense Refill    rosuvastatin (CRESTOR) 20 MG Tab Take 1 Tablet by mouth every day. 90 Tablet 3    amlodipine-benazepril (LOTREL) 5-20 MG per capsule TAKE ONE CAPSULE BY MOUTH EVERY DAY 30 Capsule 1    BREO ELLIPTA 100-25 MCG/ACT AEROSOL POWDER, BREATH ACTIVATED INHALE ONE PUFF BY MOUTH EVERY DAY, RINSE MOUTH AFTER USE 60 Each 0    tadalafil (CIALIS) 20 MG tablet tadalafil 20 mg tablet   Take 1 tablet every day by oral route as needed.      aspirin EC (ECOTRIN) 81 MG Tablet Delayed Response  "Take 81 mg by mouth every day.      albuterol 108 (90 Base) MCG/ACT Aero Soln inhalation aerosol Inhale 2 Puffs by mouth every 6 hours as needed for Shortness of Breath. 1 Each 2     No current facility-administered medications on file prior to visit.                    Review of Systems   Constitutional: Negative for fever and weight loss.   HENT: negative for otalgia  Cardiovascular - denies chest pain or dyspnea  Respiratory: Positive for cough.  .  Negative for wheezing.    Neurological: Negative for headaches.   GI - denies nausea, vomiting or diarrhea  Neuro - denies numbness or tingling.            Objective:    /85   Pulse 73   Temp 36.8 °C (98.2 °F) (Temporal)   Resp 15   Ht 1.727 m (5' 8\")   Wt 72.1 kg (159 lb)   SpO2 94%       Physical Exam   Constitutional: patient is oriented to person, place, and time. Patient appears well-developed and well-nourished. No distress.   HENT:   Head: Normocephalic and atraumatic.   Right Ear: External ear normal.   Left Ear: External ear normal.   Nose: Mucosal edema  present. Right sinus exhibits no maxillary sinus tenderness. Left sinus exhibits no maxillary sinus tenderness.   Mouth/Throat: Mucous membranes are normal. No oral lesions.  No posterior pharyngeal erythema.  No oropharyngeal exudate or posterior oropharyngeal edema.   Eyes: Conjunctivae and EOM are normal. Pupils are equal, round, and reactive to light. Right eye exhibits no discharge. Left eye exhibits no discharge. No scleral icterus.   Neck: Normal range of motion. Neck supple. No tracheal deviation present.   Cardiovascular: Normal rate, regular rhythm and normal heart sounds.  Exam reveals no friction rub.    Pulmonary/Chest: Effort normal. No respiratory distress. Patient has no wheezes or rhonchi. Patient has no rales.    Musculoskeletal:  exhibits no edema.   Lymphadenopathy:     Patient has no cervical adenopathy.      Neurological: patient is alert and oriented to person, place, and " time.   Skin: Skin is warm and dry. No rash noted. No erythema.   Psychiatric: patient  has a normal mood and affect.  behavior is normal.   Nursing note and vitals reviewed.              Assessment/Plan:     1. COPD exacerbation (HCC)     - methylPREDNISolone (MEDROL DOSEPAK) 4 MG Tablet Therapy Pack; Follow schedule on package instructions.  Dispense: 21 Tablet; Refill: 0  - benzonatate (TESSALON) 200 MG capsule; Take 1 Capsule by mouth 3 times a day as needed for Cough.  Dispense: 45 Capsule; Refill: 0      Rapid covid Negative.           Differential diagnosis, natural history, supportive care, and indications for immediate follow-up discussed. All questions answered. Patient agrees with the plan of care.     Follow-up as needed if symptoms worsen or fail to improve to PCP, Urgent care or Emergency Room.     I have personally reviewed prior external notes and test results pertinent to today's visit.  I have independently reviewed and interpreted all diagnostics ordered during this urgent care acute visit.

## 2023-01-15 ENCOUNTER — TELEPHONE (OUTPATIENT)
Dept: SCHEDULING | Facility: IMAGING CENTER | Age: 68
End: 2023-01-15
Payer: COMMERCIAL

## 2023-02-07 ENCOUNTER — OFFICE VISIT (OUTPATIENT)
Dept: SLEEP MEDICINE | Facility: MEDICAL CENTER | Age: 68
End: 2023-02-07
Payer: COMMERCIAL

## 2023-02-07 ENCOUNTER — APPOINTMENT (OUTPATIENT)
Dept: SLEEP MEDICINE | Facility: MEDICAL CENTER | Age: 68
End: 2023-02-07
Payer: COMMERCIAL

## 2023-02-07 VITALS
OXYGEN SATURATION: 98 % | DIASTOLIC BLOOD PRESSURE: 58 MMHG | HEIGHT: 68 IN | HEART RATE: 66 BPM | WEIGHT: 162 LBS | BODY MASS INDEX: 24.55 KG/M2 | SYSTOLIC BLOOD PRESSURE: 138 MMHG

## 2023-02-07 DIAGNOSIS — J45.30 MILD PERSISTENT ASTHMA WITHOUT COMPLICATION: ICD-10-CM

## 2023-02-07 PROCEDURE — 99215 OFFICE O/P EST HI 40 MIN: CPT | Performed by: STUDENT IN AN ORGANIZED HEALTH CARE EDUCATION/TRAINING PROGRAM

## 2023-02-07 RX ORDER — FLUTICASONE PROPIONATE 50 MCG
1-2 SPRAY, SUSPENSION (ML) NASAL DAILY
Qty: 16 G | Refills: 6 | Status: SHIPPED | OUTPATIENT
Start: 2023-02-07 | End: 2023-05-20

## 2023-02-07 RX ORDER — FLUTICASONE FUROATE AND VILANTEROL 200; 25 UG/1; UG/1
1 POWDER RESPIRATORY (INHALATION) DAILY
Qty: 1 EACH | Refills: 3 | Status: SHIPPED | OUTPATIENT
Start: 2023-02-07 | End: 2023-05-20

## 2023-02-07 RX ORDER — MONTELUKAST SODIUM 10 MG/1
10 TABLET ORAL EVERY EVENING
Qty: 30 TABLET | Refills: 0 | Status: SHIPPED | OUTPATIENT
Start: 2023-02-07 | End: 2023-05-20

## 2023-02-07 ASSESSMENT — ENCOUNTER SYMPTOMS
FOCAL WEAKNESS: 0
SPUTUM PRODUCTION: 0
HEARTBURN: 1
EYE DISCHARGE: 0
FALLS: 0
FEVER: 0
SHORTNESS OF BREATH: 1
HEMOPTYSIS: 0
COUGH: 0
NAUSEA: 0
WHEEZING: 0
VOMITING: 0
CHILLS: 0

## 2023-02-07 ASSESSMENT — FIBROSIS 4 INDEX: FIB4 SCORE: 1.98

## 2023-02-07 NOTE — ASSESSMENT & PLAN NOTE
Extensive history of smoking but PFTs w/o obstruction and CT w/o emphysema. Asthma symptoms seem to be exacerbated by cold air and allergies. No recent exacerbations.    - increase Breo to 200  - albuterol PRN  - saline nasal spray/flonase for allergic rhinitis  - montelukast

## 2023-02-07 NOTE — PROGRESS NOTES
"Pulmonary Clinic Note    Chief Complaint:  Chief Complaint   Patient presents with    Follow-Up     Last seen 3/28/22 with Dr. Mathur     Results     DX-Chest 8/7/22     HPI:   \"This patient is a 67 y.o. male whom is followed in our clinic for asthma vs. COPD last seen by PA, Wendi Galindo, on 2/10/22. Pt is a former smoker with estimated 40 pk year hx, quit in 2/2019. 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 Current position has eliminated this. Apparently he was seen for SOB in [place] by pulmonary and started on bronchodilators initially with albuterol and spiriva. This has been adjusted to trelegy but pt denies ever being tx for exacerbation or bronchitis with steroids. He actually denies SOB, cough or wheezing. His main complaint was CP which was thought to be GI in origin and has seen GI since his last visit with us with improved sxs following adjustment to PPI. He is planning to undergo EGD. PFTs from 2/2022 showed low normal FEV1 at 81% pred and FVC of 77% pred, normal ratio, no BD response, borderline air trapping, normal TLC and normal DLCO. He as referred to the lung Ca screening program and is pending CT with us but last CT done in 5/2020 showed 4mm nodular area in RUL and e/o prior granulomatous disease. \" Per Dr. Mathur 3/28/22    2/7/23: at last visit, patient was placed on Breo and states that this has been working for him but he still has some dyspnea especially when exposed to the cold weather or when the seasons change and his allergies get worse and he ends up using his albuterol more frequently. Also with significant acid reflux and is being managed by GI for these symptoms.       Past Medical History:   Diagnosis Date    Arthritis     Asthma without status asthmaticus 03/07/2020    Bronchitis     Carotid artery stenosis, asymptomatic, right 08/28/2020    Cataract     Chest pain     Chest tightness     Constipation     COPD (chronic " obstructive pulmonary disease) ([facility]) 03/07/2020    Frequent urination     [place] measles     Hearing difficulty     Heartburn     Hypertension     Painful joint     Shortness of breath     Sweat, sweating, excessive     Vision loss     Wears glasses     Wheezing        History reviewed. No pertinent surgical history.    Social History     Socioeconomic History    Marital status:      Spouse name: Not on file    Number of children: Not on file    Years of education: Not on file    Highest education level: Not on file   Occupational History    Not on file   Tobacco Use    Smoking status: Former     Packs/day: 1.00     Years: 40.00     Pack years: 40.00     Types: Cigarettes     Start date: 1/23/1980     Quit date: 2/25/2019     Years since quitting: 3.9    Smokeless tobacco: Never   Vaping Use    Vaping Use: Never used   Substance and Sexual Activity    Alcohol use: Yes     Comment: moderately     Drug use: No    Sexual activity: Not on file   Other Topics Concern    Not on file   Social History Narrative    Not on file     Social Determinants of Health     Financial Resource Strain: Not on file   Food Insecurity: Not on file   Transportation Needs: Not on file   Physical Activity: Not on file   Stress: Not on file   Social Connections: Not on file   Intimate Partner Violence: Not on file   Housing Stability: Not on file          Family History   Problem Relation Age of Onset    Heart Disease Mother     Heart Disease Father        Current Outpatient Medications on File Prior to Visit   Medication Sig Dispense Refill    amlodipine-benazepril (LOTREL) 5-20 MG per capsule TAKE ONE CAPSULE BY MOUTH EVERY DAY 30 Capsule 6    methylPREDNISolone (MEDROL DOSEPAK) 4 MG Tablet Therapy Pack Follow schedule on package instructions. 21 Tablet 0    benzonatate (TESSALON) 200 MG capsule Take 1 Capsule by mouth 3 times a day as needed for Cough. 45 Capsule 0    rosuvastatin (CRESTOR) 20 MG Tab Take 1 Tablet by mouth every day.  "90 Tablet 3    tadalafil (CIALIS) 20 MG tablet tadalafil 20 mg tablet   Take 1 tablet every day by oral route as needed.      aspirin EC (ECOTRIN) 81 MG Tablet Delayed Response Take 81 mg by mouth every day.      albuterol 108 (90 Base) MCG/ACT Aero Soln inhalation aerosol Inhale 2 Puffs by mouth every 6 hours as needed for Shortness of Breath. 1 Each 2     No current facility-administered medications on file prior to visit.       Allergies: Patient has no known allergies.      ROS:   Review of Systems   Constitutional:  Negative for chills and fever.   HENT:  Negative for hearing loss.    Eyes:  Negative for discharge.   Respiratory:  Positive for shortness of breath. Negative for cough, hemoptysis, sputum production and wheezing.    Cardiovascular:  Negative for chest pain.   Gastrointestinal:  Positive for heartburn. Negative for nausea and vomiting.   Musculoskeletal:  Negative for falls.   Skin:  Negative for rash.   Neurological:  Negative for focal weakness.     Vitals:  /58 (BP Location: Right arm, Patient Position: Sitting, BP Cuff Size: Adult)   Pulse 66   Ht 1.727 m (5' 8\")   Wt 73.5 kg (162 lb)   SpO2 98%     Physical Exam:  Physical Exam  Vitals and nursing note reviewed.   Constitutional:       General: He is not in acute distress.     Appearance: Normal appearance. He is not ill-appearing or toxic-appearing.   HENT:      Head: Normocephalic and atraumatic.      Nose: Nose normal.      Mouth/Throat:      Mouth: Mucous membranes are moist.   Eyes:      General: No scleral icterus.     Conjunctiva/sclera: Conjunctivae normal.   Cardiovascular:      Rate and Rhythm: Normal rate and regular rhythm.   Pulmonary:      Effort: Pulmonary effort is normal. No respiratory distress.      Breath sounds: No wheezing, rhonchi or rales.   Abdominal:      Palpations: Abdomen is soft.   Musculoskeletal:         General: No deformity or signs of injury. Normal range of motion.      Cervical back: Normal range " of motion.   Skin:     General: Skin is warm and dry.   Neurological:      General: No focal deficit present.      Mental Status: He is alert. Mental status is at baseline.   Psychiatric:         Mood and Affect: Mood normal.         Behavior: Behavior normal.       Data:  Reviewed     Assessment/Plan:    Problem List Items Addressed This Visit       Mild persistent asthma without complication     Extensive history of smoking but PFTs w/o obstruction and CT w/o emphysema. Asthma symptoms seem to be exacerbated by cold air and allergies. No recent exacerbations.    - increase Breo to 200  - albuterol PRN  - saline nasal spray/flonase for allergic rhinitis  - montelukast         Relevant Medications    fluticasone furoate-vilanterol (BREO ELLIPTA) 200-25 MCG/ACT AEROSOL POWDER, BREATH ACTIVATED    fluticasone (FLONASE) 50 MCG/ACT nasal spray    montelukast (SINGULAIR) 10 MG Tab       Return 3-6 months.     This note was generated using voice recognition software which has a chance of producing errors of grammar and possibly content.  I have made every reasonable attempt to find and correct any obvious errors, but it should be expected that some may not be found prior to finalization of this note.    Time spent in record review prior to patient arrival, reviewing results, and in face-to-face encounter totaled 40 min, excluding any procedures if performed.    Sho Khan MD  Pulmonary and Critical Care Medicine  [facility]

## 2023-02-08 ENCOUNTER — OFFICE VISIT (OUTPATIENT)
Dept: MEDICAL GROUP | Facility: PHYSICIAN GROUP | Age: 68
End: 2023-02-08
Payer: COMMERCIAL

## 2023-02-08 VITALS
OXYGEN SATURATION: 97 % | TEMPERATURE: 99.1 F | RESPIRATION RATE: 16 BRPM | HEART RATE: 84 BPM | HEIGHT: 68 IN | BODY MASS INDEX: 24.58 KG/M2 | WEIGHT: 162.2 LBS | DIASTOLIC BLOOD PRESSURE: 60 MMHG | SYSTOLIC BLOOD PRESSURE: 132 MMHG

## 2023-02-08 DIAGNOSIS — I10 PRIMARY HYPERTENSION: ICD-10-CM

## 2023-02-08 DIAGNOSIS — Z23 NEED FOR VACCINATION: ICD-10-CM

## 2023-02-08 DIAGNOSIS — A04.8 H. PYLORI INFECTION: ICD-10-CM

## 2023-02-08 DIAGNOSIS — E78.49 OTHER HYPERLIPIDEMIA: ICD-10-CM

## 2023-02-08 DIAGNOSIS — K21.9 GASTROESOPHAGEAL REFLUX DISEASE WITHOUT ESOPHAGITIS: ICD-10-CM

## 2023-02-08 DIAGNOSIS — J45.30 MILD PERSISTENT ASTHMA WITHOUT COMPLICATION: ICD-10-CM

## 2023-02-08 DIAGNOSIS — Z87.438 HISTORY OF BPH: ICD-10-CM

## 2023-02-08 DIAGNOSIS — Z86.73 HISTORY OF TIA (TRANSIENT ISCHEMIC ATTACK): ICD-10-CM

## 2023-02-08 DIAGNOSIS — J45.40 MODERATE PERSISTENT ASTHMA WITHOUT STATUS ASTHMATICUS WITHOUT COMPLICATION: ICD-10-CM

## 2023-02-08 DIAGNOSIS — I65.21 CAROTID ARTERY STENOSIS, ASYMPTOMATIC, RIGHT: ICD-10-CM

## 2023-02-08 DIAGNOSIS — Z87.891 FORMER SMOKER: ICD-10-CM

## 2023-02-08 PROBLEM — R07.82 INTERCOSTAL PAIN: Status: RESOLVED | Noted: 2020-04-20 | Resolved: 2023-02-08

## 2023-02-08 PROBLEM — N40.1 BENIGN PROSTATIC HYPERPLASIA WITH URINARY OBSTRUCTION: Status: RESOLVED | Noted: 2020-03-03 | Resolved: 2023-02-08

## 2023-02-08 PROBLEM — N48.9 LESION OF PENIS: Status: RESOLVED | Noted: 2020-03-03 | Resolved: 2023-02-08

## 2023-02-08 PROBLEM — R33.9 INCOMPLETE EMPTYING OF BLADDER: Status: RESOLVED | Noted: 2020-03-03 | Resolved: 2023-02-08

## 2023-02-08 PROBLEM — J44.9 COPD (CHRONIC OBSTRUCTIVE PULMONARY DISEASE) (HCC): Status: RESOLVED | Noted: 2020-03-07 | Resolved: 2023-02-08

## 2023-02-08 PROBLEM — R35.1 NOCTURIA: Status: RESOLVED | Noted: 2020-03-03 | Resolved: 2023-02-08

## 2023-02-08 PROBLEM — N13.8 BENIGN PROSTATIC HYPERPLASIA WITH URINARY OBSTRUCTION: Status: RESOLVED | Noted: 2020-03-03 | Resolved: 2023-02-08

## 2023-02-08 PROBLEM — R97.20 RAISED PROSTATE SPECIFIC ANTIGEN: Status: RESOLVED | Noted: 2020-03-03 | Resolved: 2023-02-08

## 2023-02-08 PROCEDURE — 90471 IMMUNIZATION ADMIN: CPT | Performed by: PHYSICIAN ASSISTANT

## 2023-02-08 PROCEDURE — 90677 PCV20 VACCINE IM: CPT | Performed by: PHYSICIAN ASSISTANT

## 2023-02-08 PROCEDURE — 99214 OFFICE O/P EST MOD 30 MIN: CPT | Mod: 25 | Performed by: NURSE PRACTITIONER

## 2023-02-08 RX ORDER — SULFAMETHOXAZOLE AND TRIMETHOPRIM 800; 160 MG/1; MG/1
1 TABLET ORAL 2 TIMES DAILY
COMMUNITY
End: 2023-03-16

## 2023-02-08 RX ORDER — ESOMEPRAZOLE MAGNESIUM 40 MG/1
40 CAPSULE, DELAYED RELEASE ORAL
COMMUNITY
End: 2024-01-02

## 2023-02-08 ASSESSMENT — PATIENT HEALTH QUESTIONNAIRE - PHQ9: CLINICAL INTERPRETATION OF PHQ2 SCORE: 0

## 2023-02-08 ASSESSMENT — FIBROSIS 4 INDEX: FIB4 SCORE: 1.98

## 2023-02-08 NOTE — ASSESSMENT & PLAN NOTE
Chronic stable condition.    Blood pressure in office 132/60   Currently takes Lotrel 5-20 mg daily  Denies headache, lightheadedness, shortness of breath, peripheral edema

## 2023-02-08 NOTE — ASSESSMENT & PLAN NOTE
Chronic and stable condition   Takes Cresto 20 mg daily   Follows with vascular medicine  History of TIA in 2018

## 2023-02-08 NOTE — ASSESSMENT & PLAN NOTE
Chronic stable condition.  Patient is a previous smoker however quit back in 2019.  Recently followed up with pulmonary who noted that patient had PFTs completed without any obstruction as well as a CT exam without any disease.  They noted that more likely he has asthma versus COPD.  Patient continues to use Breo, albuterol as needed, saline nasal spray and Flonase as well as montelukast  Denies any cough, chest pain, flareups.

## 2023-02-08 NOTE — ASSESSMENT & PLAN NOTE
Chronic and stable condition   History of prostectomy in 12/2022 which was benign per patient.   Has noted improvement with urinary flow since surgery.

## 2023-02-08 NOTE — ASSESSMENT & PLAN NOTE
Chronic and stable condition.    Patient currently takes Crestor 20 mg daily   Follow-up with heart and vascular   Denies any myalgias, drinking

## 2023-02-08 NOTE — LETTER
[facility]  ETHAN Ramos  [address]  [place] [address]  Fax: [phone]   Authorization for Release/Disclosure of   Protected Health Information   Name: ADRIÁN SHELDON : 1955 SSN: [id number]   Address: [address] [address] Phone:    [phone] (home)    I authorize the entity listed below to release/disclose the PHI below to:   [facility]/ETHAN Ramos and ETHAN Ramos   Provider or Entity Name:  [facility]   Address   City, State, Zip   Phone:      Fax:     Reason for request: continuity of care   Information to be released:    [  ] LAST COLONOSCOPY,  including any PATH REPORT and follow-up  [  ] LAST FIT/COLOGUARD RESULT [  ] LAST DEXA  [  ] LAST MAMMOGRAM  [  ] LAST PAP  [  ] LAST LABS [  ] RETINA EXAM REPORT  [  ] IMMUNIZATION RECORDS  [ X ] Release all info      [  ] Check here and initial the line next to each item to release ALL health information INCLUDING  _____ Care and treatment for drug and / or alcohol abuse  _____ HIV testing, infection status, or AIDS  _____ Genetic Testing    DATES OF SERVICE OR TIME PERIOD TO BE DISCLOSED: _____________  I understand and acknowledge that:  * This Authorization may be revoked at any time by you in writing, except if your health information has already been used or disclosed.  * Your health information that will be used or disclosed as a result of you signing this authorization could be re-disclosed by the recipient. If this occurs, your re-disclosed health information may no longer be protected by State or Federal laws.  * You may refuse to sign this Authorization. Your refusal will not affect your ability to obtain treatment.  * This Authorization becomes effective upon signing and will  on (date) __________.      If no date is indicated, this Authorization will  one (1) year from the signature date.    Name: Adrián Sheldon    Signature:   Date:     2023        PLEASE FAX REQUESTED RECORDS BACK TO: [phone]

## 2023-02-08 NOTE — ASSESSMENT & PLAN NOTE
Acute and uncomplicated condition   Follows with GI, [facility] Dr. Simental.  Currently taking Bactrim.   Denies any new changes with antibiotics

## 2023-02-08 NOTE — ASSESSMENT & PLAN NOTE
Quit smoking 5-6 years ago  Was smoking 1 1/2 ppd  History of astma   follows with pulmonology   Uses albuterol inhaler and breo

## 2023-02-08 NOTE — ASSESSMENT & PLAN NOTE
Chronic and stable condition   Is using nexium  40 mg daily   States he continues to have symptoms   Notes that he sometimes have hoarse voice and coughs often  States symptoms worsen with laying down  Notes abdominal discomfort as well at times   Follows with GI at [facility]- we are pending records  States history of EGD but not sure if there were any concerns for barrets

## 2023-02-08 NOTE — ASSESSMENT & PLAN NOTE
Chronic and stable condition.    11/2018  Was seen in [facility]  Takes 81 mg ASA daily   Denies any deficits

## 2023-02-08 NOTE — LETTER
[facility]  ETHAN Ramos  [address]  [place] [address]  Fax: [phone]   Authorization for Release/Disclosure of   Protected Health Information   Name: ADRIÁN SHELDON : 1955 SSN: [id number]   Address: [address] [address] Phone:    [phone] (home)    I authorize the entity listed below to release/disclose the PHI below to:   [facility]/ETHAN Ramos and ETHAN Ramos   Provider or Entity Name:  [facility]   Address   City, State, Zip   Phone:      Fax:     Reason for request: continuity of care   Information to be released:    [x  ] LAST COLONOSCOPY,  including any PATH REPORT and follow-up  [  ] LAST FIT/COLOGUARD RESULT [  ] LAST DEXA  [  ] LAST MAMMOGRAM  [  ] LAST PAP  [  ] LAST LABS [  ] RETINA EXAM REPORT  [  ] IMMUNIZATION RECORDS  [  ] Release all info      [  ] Check here and initial the line next to each item to release ALL health information INCLUDING  _____ Care and treatment for drug and / or alcohol abuse  _____ HIV testing, infection status, or AIDS  _____ Genetic Testing    DATES OF SERVICE OR TIME PERIOD TO BE DISCLOSED: _____________  I understand and acknowledge that:  * This Authorization may be revoked at any time by you in writing, except if your health information has already been used or disclosed.  * Your health information that will be used or disclosed as a result of you signing this authorization could be re-disclosed by the recipient. If this occurs, your re-disclosed health information may no longer be protected by State or Federal laws.  * You may refuse to sign this Authorization. Your refusal will not affect your ability to obtain treatment.  * This Authorization becomes effective upon signing and will  on (date) __________.      If no date is indicated, this Authorization will  one (1) year from the signature date.    Name: Adrián Sheldon    Signature:   Date:     2023        PLEASE FAX REQUESTED RECORDS BACK TO: [phone]

## 2023-02-09 NOTE — PROGRESS NOTES
Chief Complaint   Patient presents with    Establish Care      Subjective:     Adrián Sheldon is a 67 y.o. male presenting for       Other hyperlipidemia  Chronic and stable condition.    Patient currently takes Crestor 20 mg daily   Follow-up with heart and vascular   Denies any myalgias, drinking    Gastroesophageal reflux disease without esophagitis  Chronic and stable condition   Is using nexium  40 mg daily   States he continues to have symptoms   Notes that he sometimes have hoarse voice and coughs often  States symptoms worsen with laying down  Notes abdominal discomfort as well at times   Follows with GI at [facility]- we are pending records  States history of EGD but not sure if there were any concerns for barrets    H. pylori infection  Acute and uncomplicated condition   Follows with GI, [facility] Dr. Simental.  Currently taking Bactrim.   Denies any new changes with antibiotics      Former smoker  Quit smoking 5-6 years ago  Was smoking 1 1/2 ppd  History of astma   follows with pulmonology   Uses albuterol inhaler and breo    History of TIA (transient ischemic attack)  Chronic and stable condition.    11/2018  Was seen in [facility]  Takes 81 mg ASA daily   Denies any deficits    Carotid artery stenosis, asymptomatic, right  Chronic and stable condition   Takes Cresto 20 mg daily   Follows with vascular medicine  History of TIA in 2018      Asthma without status asthmaticus  Chronic stable condition.  Patient is a previous smoker however quit back in 2019.  Recently followed up with pulmonary who noted that patient had PFTs completed without any obstruction as well as a CT exam without any disease.  They noted that more likely he has asthma versus COPD.  Patient continues to use Breo, albuterol as needed, saline nasal spray and Flonase as well as montelukast  Denies any cough, chest pain, flareups.      History of BPH  Chronic and stable condition   History of prostectomy in 12/2022 which was benign per patient.   Has  noted improvement with urinary flow since surgery.       HTN (hypertension)  Chronic stable condition.    Blood pressure in office 132/60   Currently takes Lotrel 5-20 mg daily  Denies headache, lightheadedness, shortness of breath, peripheral edema      Mild persistent asthma without complication  Chronic stable condition.  Patient is a previous smoker however quit back in 2019.  Recently followed up with pulmonary who noted that patient had PFTs completed without any obstruction as well as a CT exam without any disease.  They noted that more likely he has asthma versus COPD.  Patient continues to use Breo, albuterol as needed, saline nasal spray and Flonase as well as montelukast  Denies any cough, chest pain, flareups.     ROS   See HPI    Current Outpatient Medications:     esomeprazole (NEXIUM) 40 MG delayed-release capsule, Take 40 mg by mouth every morning before breakfast., Disp: , Rfl:     sulfamethoxazole-trimethoprim (BACTRIM DS) 800-160 MG tablet, Take 1 Tablet by mouth 2 times a day., Disp: , Rfl:     fluticasone furoate-vilanterol (BREO ELLIPTA) 200-25 MCG/ACT AEROSOL POWDER, BREATH ACTIVATED, Inhale 1 Puff every day. Rinse mouth after use., Disp: 1 Each, Rfl: 3    montelukast (SINGULAIR) 10 MG Tab, Take 1 Tablet by mouth every evening., Disp: 30 Tablet, Rfl: 0    amlodipine-benazepril (LOTREL) 5-20 MG per capsule, TAKE ONE CAPSULE BY MOUTH EVERY DAY, Disp: 30 Capsule, Rfl: 6    rosuvastatin (CRESTOR) 20 MG Tab, Take 1 Tablet by mouth every day., Disp: 90 Tablet, Rfl: 3    aspirin EC (ECOTRIN) 81 MG Tablet Delayed Response, Take 81 mg by mouth every day., Disp: , Rfl:     albuterol 108 (90 Base) MCG/ACT Aero Soln inhalation aerosol, Inhale 2 Puffs by mouth every 6 hours as needed for Shortness of Breath., Disp: 1 Each, Rfl: 2    fluticasone (FLONASE) 50 MCG/ACT nasal spray, Administer 1-2 Sprays into affected nostril(S) every day. Each nostril. (Patient not taking: Reported on 2/8/2023), Disp: 16 g,  Rfl: 6    tadalafil (CIALIS) 20 MG tablet, tadalafil 20 mg tablet  Take 1 tablet every day by oral route as needed. (Patient not taking: Reported on 2/8/2023), Disp: , Rfl:     Past Medical History:   Diagnosis Date    Arthritis     Asthma without status asthmaticus 03/07/2020    Benign prostatic hyperplasia with urinary obstruction 3/3/2020    Bronchitis     Carotid artery stenosis, asymptomatic, right 08/28/2020    Cataract     Chest pain     Chest tightness     Constipation     COPD (chronic obstructive pulmonary disease) ([facility]) 03/07/2020    COPD (chronic obstructive pulmonary disease) ([facility]) 3/7/2020    Frequent urination     Citizen of [place] measles     H. pylori infection 2/8/2023    Hearing difficulty     Heartburn     Hypertension     Incomplete emptying of bladder 3/3/2020    Intercostal pain, R side, musculoskeletal chondritis 4/20/2020    Lesion of penis 3/3/2020    Nocturia 3/3/2020    Painful joint     Raised prostate specific antigen 3/3/2020    Shortness of breath     Sweat, sweating, excessive     Vision loss     Wears glasses     Wheezing        Past Surgical History:   Procedure Laterality Date    PROSTATECTOMY, RADICAL RETRO  12/2022    LAMINOTOMY  06/2022    L3-L4       Social History     Tobacco Use    Smoking status: Former     Packs/day: 1.00     Years: 40.00     Pack years: 40.00     Types: Cigarettes     Start date: 1/23/1980     Quit date: 2/25/2019     Years since quitting: 3.9    Smokeless tobacco: Never   Vaping Use    Vaping Use: Never used   Substance Use Topics    Alcohol use: Yes     Comment: moderately     Drug use: No       Family History   Problem Relation Age of Onset    No Known Problems Mother     Heart Attack Father 70    Heart Disease Father     Heart Attack Brother 30    Heart Disease Brother        Patient has no known allergies.    Allergies, past medical history, past surgical history, family history, social history reviewed and updated    Objective:     Vitals: /60 (BP  "Location: Left arm, Patient Position: Sitting, BP Cuff Size: Adult)   Pulse 84   Temp 37.3 °C (99.1 °F) (Temporal)   Resp 16   Ht 1.727 m (5' 8\")   Wt 73.6 kg (162 lb 3.2 oz)   SpO2 97%   BMI 24.66 kg/m²   General: Alert, pleasant, NAD  EYES:   PERRL, EOMI, no icterus or pallor.  Conjunctivae and lids normal.   HENT:  Normocephalic.  External ears normal. Tympanic membranes pearly, opaque.  No nasal drainage present.  Oropharynx non-erythematous, mucous membranes moist.  Neck supple.   No thyromegaly or masses palpated. No cervical or supraclavicular lymphadenopathy.  Heart: Regular rate and rhythm.  S1 and S2 normal.  No murmurs appreciated.  Respiratory: Normal respiratory effort.  Clear to auscultation bilaterally.  Musculoskeletal: Gait is normal.  Moves all extremities well.    Extremities: No clubbing, cyanosis or edema noted.   Neurological: No tremors, sensation grossly intact, tone/strength normal, gait is normal, CN2-12 intact.  Psych:  Affect/mood is normal, judgement is good, memory is intact, grooming is appropriate.    Assessment/Plan:     1. Other hyperlipidemia  Continue with crestor    2. Gastroesophageal reflux disease without esophagitis  Continue with nexium    3. H. pylori infection  Continue with bactrim     4. Former smoker    5. History of TIA (transient ischemic attack)  Continue with ASA    6. Carotid artery stenosis, asymptomatic, right  Continue with vascular and crestor    7. Moderate persistent asthma without status asthmaticus without complication  Continue with inhaler and pulmonology    8. History of BPH  Continue to monitor    9. Primary hypertension  Continue with lotrel    10. Need for vaccination  - Pneumococcal Conjugate Vaccine 20-Valent (19 yrs+)    11. Mild persistent asthma without complication  Continue with pulmonology and inhaler       Discussed with patient possible alternative diagnoses, patient is to take all medications as prescribed.     If symptoms persist FU " w/PCP, if symptoms worsen go to emergency room.     If experiencing any side effects from prescribed medications reports to the office immediately or go to emergency room.    Reviewed indication, dosage, usage and potential adverse effects of prescribed medications.     Reviewed risks and benefits of treatment plan. Patient verbalizes understanding of all instruction and verbally agrees to plan.    Discussed plan with the patient, and she agrees to the above.      I personally reviewed prior external notes and test results pertinent to today's visit.        Return in about 13 weeks (around 5/10/2023) for follow up 3 months- labs .      Please note that this dictation was created using voice recognition software. I have made every reasonable attempt to correct obvious errors, but I expect that there may be errors of grammar and possibly content that I did not discover before finalizing the note.

## 2023-02-13 ENCOUNTER — TELEPHONE (OUTPATIENT)
Dept: MEDICAL GROUP | Facility: PHYSICIAN GROUP | Age: 68
End: 2023-02-13
Payer: COMMERCIAL

## 2023-02-13 ENCOUNTER — TELEPHONE (OUTPATIENT)
Dept: SLEEP MEDICINE | Facility: MEDICAL CENTER | Age: 68
End: 2023-02-13
Payer: COMMERCIAL

## 2023-02-13 NOTE — TELEPHONE ENCOUNTER
Patient states PCP was supposed to get in contact with his Pulmonary Doctor in regards his movement limitation for work, he also states this was discussed in previous visit.   Please advise.

## 2023-02-13 NOTE — TELEPHONE ENCOUNTER
Pt walked in and stated that his employer is requesting a letter from you with his limitations and restrictions due to his Asthma. FAX # [phone] ATTN: Edelmira Santos. He needs this ASAP.   Please print out a proof of confirmation and call pt to let him know when it has been done. Thanks.

## 2023-02-15 ENCOUNTER — TELEPHONE (OUTPATIENT)
Dept: SLEEP MEDICINE | Facility: MEDICAL CENTER | Age: 68
End: 2023-02-15
Payer: COMMERCIAL

## 2023-02-27 ENCOUNTER — HOSPITAL ENCOUNTER (OUTPATIENT)
Dept: LAB | Facility: MEDICAL CENTER | Age: 68
End: 2023-02-27
Attending: UROLOGY
Payer: COMMERCIAL

## 2023-02-27 ENCOUNTER — HOSPITAL ENCOUNTER (OUTPATIENT)
Dept: LAB | Facility: MEDICAL CENTER | Age: 68
End: 2023-02-27
Payer: COMMERCIAL

## 2023-02-27 ENCOUNTER — HOSPITAL ENCOUNTER (OUTPATIENT)
Dept: LAB | Facility: MEDICAL CENTER | Age: 68
End: 2023-02-27
Payer: MEDICARE

## 2023-02-27 DIAGNOSIS — Z79.02 ANTIPLATELET OR ANTITHROMBOTIC LONG-TERM USE: ICD-10-CM

## 2023-02-27 DIAGNOSIS — E78.49 OTHER HYPERLIPIDEMIA: ICD-10-CM

## 2023-02-27 DIAGNOSIS — I10 PRIMARY HYPERTENSION: ICD-10-CM

## 2023-02-27 DIAGNOSIS — R73.01 IMPAIRED FASTING GLUCOSE: ICD-10-CM

## 2023-02-27 LAB
ALBUMIN SERPL BCP-MCNC: 4.6 G/DL (ref 3.2–4.9)
ALBUMIN SERPL BCP-MCNC: 4.7 G/DL (ref 3.2–4.9)
ALBUMIN/GLOB SERPL: 2 G/DL
ALBUMIN/GLOB SERPL: 2 G/DL
ALP SERPL-CCNC: 88 U/L (ref 30–99)
ALP SERPL-CCNC: 91 U/L (ref 30–99)
ALT SERPL-CCNC: 24 U/L (ref 2–50)
ALT SERPL-CCNC: 26 U/L (ref 2–50)
AMYLASE SERPL-CCNC: 75 U/L (ref 20–103)
ANION GAP SERPL CALC-SCNC: 10 MMOL/L (ref 7–16)
ANION GAP SERPL CALC-SCNC: 12 MMOL/L (ref 7–16)
AST SERPL-CCNC: 29 U/L (ref 12–45)
AST SERPL-CCNC: 33 U/L (ref 12–45)
BASOPHILS # BLD AUTO: 1 % (ref 0–1.8)
BASOPHILS # BLD: 0.05 K/UL (ref 0–0.12)
BILIRUB SERPL-MCNC: 0.6 MG/DL (ref 0.1–1.5)
BILIRUB SERPL-MCNC: 0.6 MG/DL (ref 0.1–1.5)
BUN SERPL-MCNC: 11 MG/DL (ref 8–22)
BUN SERPL-MCNC: 12 MG/DL (ref 8–22)
CALCIUM ALBUM COR SERPL-MCNC: 8.6 MG/DL (ref 8.5–10.5)
CALCIUM ALBUM COR SERPL-MCNC: 8.8 MG/DL (ref 8.5–10.5)
CALCIUM SERPL-MCNC: 9.2 MG/DL (ref 8.5–10.5)
CALCIUM SERPL-MCNC: 9.3 MG/DL (ref 8.5–10.5)
CHLORIDE SERPL-SCNC: 103 MMOL/L (ref 96–112)
CHLORIDE SERPL-SCNC: 104 MMOL/L (ref 96–112)
CHOLEST SERPL-MCNC: 141 MG/DL (ref 100–199)
CO2 SERPL-SCNC: 24 MMOL/L (ref 20–33)
CO2 SERPL-SCNC: 24 MMOL/L (ref 20–33)
CREAT SERPL-MCNC: 0.98 MG/DL (ref 0.5–1.4)
CREAT SERPL-MCNC: 1.02 MG/DL (ref 0.5–1.4)
CREAT UR-MCNC: 97.37 MG/DL
EOSINOPHIL # BLD AUTO: 0.09 K/UL (ref 0–0.51)
EOSINOPHIL NFR BLD: 1.8 % (ref 0–6.9)
ERYTHROCYTE [DISTWIDTH] IN BLOOD BY AUTOMATED COUNT: 44.1 FL (ref 35.9–50)
ERYTHROCYTE [DISTWIDTH] IN BLOOD BY AUTOMATED COUNT: 45.1 FL (ref 35.9–50)
ESTRADIOL SERPL-MCNC: 15.2 PG/ML
FASTING STATUS PATIENT QL REPORTED: NORMAL
GFR SERPLBLD CREATININE-BSD FMLA CKD-EPI: 79 ML/MIN/1.73 M 2
GFR SERPLBLD CREATININE-BSD FMLA CKD-EPI: 83 ML/MIN/1.73 M 2
GLOBULIN SER CALC-MCNC: 2.3 G/DL (ref 1.9–3.5)
GLOBULIN SER CALC-MCNC: 2.4 G/DL (ref 1.9–3.5)
GLUCOSE SERPL-MCNC: 137 MG/DL (ref 65–99)
GLUCOSE SERPL-MCNC: 140 MG/DL (ref 65–99)
HCT VFR BLD AUTO: 46.3 % (ref 42–52)
HCT VFR BLD AUTO: 47.1 % (ref 42–52)
HDLC SERPL-MCNC: 55 MG/DL
HGB BLD-MCNC: 16.1 G/DL (ref 14–18)
HGB BLD-MCNC: 16.1 G/DL (ref 14–18)
IMM GRANULOCYTES # BLD AUTO: 0.03 K/UL (ref 0–0.11)
IMM GRANULOCYTES NFR BLD AUTO: 0.6 % (ref 0–0.9)
LDLC SERPL CALC-MCNC: 73 MG/DL
LIPASE SERPL-CCNC: 123 U/L (ref 11–82)
LYMPHOCYTES # BLD AUTO: 1.57 K/UL (ref 1–4.8)
LYMPHOCYTES NFR BLD: 31.8 % (ref 22–41)
MCH RBC QN AUTO: 31.2 PG (ref 27–33)
MCH RBC QN AUTO: 31.3 PG (ref 27–33)
MCHC RBC AUTO-ENTMCNC: 34.2 G/DL (ref 33.7–35.3)
MCHC RBC AUTO-ENTMCNC: 34.8 G/DL (ref 33.7–35.3)
MCV RBC AUTO: 89.7 FL (ref 81.4–97.8)
MCV RBC AUTO: 91.5 FL (ref 81.4–97.8)
MICROALBUMIN UR-MCNC: 2.9 MG/DL
MICROALBUMIN/CREAT UR: 30 MG/G (ref 0–30)
MONOCYTES # BLD AUTO: 0.32 K/UL (ref 0–0.85)
MONOCYTES NFR BLD AUTO: 6.5 % (ref 0–13.4)
NEUTROPHILS # BLD AUTO: 2.87 K/UL (ref 1.82–7.42)
NEUTROPHILS NFR BLD: 58.3 % (ref 44–72)
NRBC # BLD AUTO: 0 K/UL
NRBC BLD-RTO: 0 /100 WBC
PLATELET # BLD AUTO: 183 K/UL (ref 164–446)
PLATELET # BLD AUTO: 195 K/UL (ref 164–446)
PMV BLD AUTO: 10.5 FL (ref 9–12.9)
PMV BLD AUTO: 10.7 FL (ref 9–12.9)
POTASSIUM SERPL-SCNC: 4.7 MMOL/L (ref 3.6–5.5)
POTASSIUM SERPL-SCNC: 4.8 MMOL/L (ref 3.6–5.5)
PROT SERPL-MCNC: 6.9 G/DL (ref 6–8.2)
PROT SERPL-MCNC: 7.1 G/DL (ref 6–8.2)
RBC # BLD AUTO: 5.15 M/UL (ref 4.7–6.1)
RBC # BLD AUTO: 5.16 M/UL (ref 4.7–6.1)
SODIUM SERPL-SCNC: 137 MMOL/L (ref 135–145)
SODIUM SERPL-SCNC: 140 MMOL/L (ref 135–145)
TRIGL SERPL-MCNC: 67 MG/DL (ref 0–149)
WBC # BLD AUTO: 4.9 K/UL (ref 4.8–10.8)
WBC # BLD AUTO: 5.1 K/UL (ref 4.8–10.8)

## 2023-02-27 PROCEDURE — 86376 MICROSOMAL ANTIBODY EACH: CPT

## 2023-02-27 PROCEDURE — 84481 FREE ASSAY (FT-3): CPT

## 2023-02-27 PROCEDURE — 84403 ASSAY OF TOTAL TESTOSTERONE: CPT

## 2023-02-27 PROCEDURE — 84443 ASSAY THYROID STIM HORMONE: CPT

## 2023-02-27 PROCEDURE — 80053 COMPREHEN METABOLIC PANEL: CPT | Mod: 91

## 2023-02-27 PROCEDURE — 82670 ASSAY OF TOTAL ESTRADIOL: CPT

## 2023-02-27 PROCEDURE — 85027 COMPLETE CBC AUTOMATED: CPT

## 2023-02-27 PROCEDURE — 82306 VITAMIN D 25 HYDROXY: CPT

## 2023-02-27 PROCEDURE — 80061 LIPID PANEL: CPT

## 2023-02-27 PROCEDURE — 82043 UR ALBUMIN QUANTITATIVE: CPT

## 2023-02-27 PROCEDURE — 85025 COMPLETE CBC W/AUTO DIFF WBC: CPT

## 2023-02-27 PROCEDURE — 84270 ASSAY OF SEX HORMONE GLOBUL: CPT

## 2023-02-27 PROCEDURE — 84402 ASSAY OF FREE TESTOSTERONE: CPT

## 2023-02-27 PROCEDURE — 83690 ASSAY OF LIPASE: CPT

## 2023-02-27 PROCEDURE — 80053 COMPREHEN METABOLIC PANEL: CPT

## 2023-02-27 PROCEDURE — 82150 ASSAY OF AMYLASE: CPT

## 2023-02-27 PROCEDURE — 84436 ASSAY OF TOTAL THYROXINE: CPT

## 2023-02-27 PROCEDURE — 36415 COLL VENOUS BLD VENIPUNCTURE: CPT

## 2023-02-27 PROCEDURE — 82570 ASSAY OF URINE CREATININE: CPT

## 2023-02-27 PROCEDURE — 84153 ASSAY OF PSA TOTAL: CPT

## 2023-02-28 LAB
25(OH)D3 SERPL-MCNC: 12 NG/ML (ref 30–100)
PSA SERPL-MCNC: 1.2 NG/ML (ref 0–4)
T3FREE SERPL-MCNC: 3.38 PG/ML (ref 2–4.4)
T4 SERPL-MCNC: 7.1 UG/DL (ref 4–12)
THYROPEROXIDASE AB SERPL-ACNC: <9 IU/ML (ref 0–9)
TSH SERPL DL<=0.005 MIU/L-ACNC: 0.72 UIU/ML (ref 0.38–5.33)

## 2023-03-01 ENCOUNTER — OFFICE VISIT (OUTPATIENT)
Dept: SLEEP MEDICINE | Facility: MEDICAL CENTER | Age: 68
End: 2023-03-01
Attending: INTERNAL MEDICINE
Payer: MEDICARE

## 2023-03-01 VITALS
OXYGEN SATURATION: 97 % | SYSTOLIC BLOOD PRESSURE: 142 MMHG | RESPIRATION RATE: 16 BRPM | HEART RATE: 67 BPM | BODY MASS INDEX: 24.4 KG/M2 | DIASTOLIC BLOOD PRESSURE: 68 MMHG | WEIGHT: 161 LBS | HEIGHT: 68 IN

## 2023-03-01 DIAGNOSIS — Z87.891 FORMER SMOKER: ICD-10-CM

## 2023-03-01 DIAGNOSIS — Z57.9 OCCUPATIONAL EXPOSURE IN WORKPLACE: ICD-10-CM

## 2023-03-01 DIAGNOSIS — K21.9 GASTROESOPHAGEAL REFLUX DISEASE, UNSPECIFIED WHETHER ESOPHAGITIS PRESENT: ICD-10-CM

## 2023-03-01 DIAGNOSIS — J45.40 MODERATE PERSISTENT ASTHMA WITHOUT STATUS ASTHMATICUS WITHOUT COMPLICATION: ICD-10-CM

## 2023-03-01 LAB
SHBG SERPL-SCNC: 43 NMOL/L (ref 19–76)
TESTOST FREE MFR SERPL: 1.6 % (ref 1.6–2.9)
TESTOST FREE SERPL-MCNC: 82 PG/ML (ref 47–244)
TESTOST SERPL-MCNC: 502 NG/DL (ref 300–720)

## 2023-03-01 PROCEDURE — 99214 OFFICE O/P EST MOD 30 MIN: CPT | Performed by: INTERNAL MEDICINE

## 2023-03-01 PROCEDURE — 99212 OFFICE O/P EST SF 10 MIN: CPT | Performed by: INTERNAL MEDICINE

## 2023-03-01 SDOH — HEALTH STABILITY - PHYSICAL HEALTH: OCCUPATIONAL EXPOSURE TO UNSPECIFIED RISK FACTOR: Z57.9

## 2023-03-01 ASSESSMENT — ENCOUNTER SYMPTOMS
EYE REDNESS: 0
HEARTBURN: 0
DIARRHEA: 0
SPEECH CHANGE: 0
CLAUDICATION: 0
TREMORS: 0
HEADACHES: 0
VOMITING: 0
FALLS: 0
BLURRED VISION: 0
WHEEZING: 0
EYE DISCHARGE: 0
STRIDOR: 0
BACK PAIN: 0
NECK PAIN: 0
PHOTOPHOBIA: 0
COUGH: 0
NAUSEA: 0
SPUTUM PRODUCTION: 0
SHORTNESS OF BREATH: 0
CHILLS: 0
PALPITATIONS: 0
HEMOPTYSIS: 0
ORTHOPNEA: 0
DEPRESSION: 0
WEIGHT LOSS: 0
DIAPHORESIS: 0
FEVER: 0
WEAKNESS: 0
SINUS PAIN: 0
FOCAL WEAKNESS: 0
DIZZINESS: 0
PND: 0
DOUBLE VISION: 0
MYALGIAS: 0
ABDOMINAL PAIN: 0
EYE PAIN: 0
SORE THROAT: 0
CONSTIPATION: 0

## 2023-03-01 ASSESSMENT — FIBROSIS 4 INDEX: FIB4 SCORE: 2.44

## 2023-03-01 NOTE — PROGRESS NOTES
No chief complaint on file.        HPI: This patient is a 67 y.o. male whom is followed in our clinic for asthma last seen by me Dr. Khan on 2/7/23 and prior to that, by me on 3/28/22. Pt is a former smoker with estimated 40 pk year hx, quit in 2/2019. He works in the mines and has had difficulty with his asthma in the past but was controlled at my last visit with him in March of last year. At the time of dx in 2020 he was seen in [place] and started on albuterol and spiriva. The was changed to Trelegy at some point and most recently the pt is on Breo which was increased from 100 to 200 last visit with Dr. Khan for uncontrolled sxs w/o acute exacerbation. PFTs from 2/2022 showed low normal FEV1 at 81% pred and FVC of 77% pred, normal ratio, no BD response, borderline air trapping, normal TLC and normal DLCO.  He does have significant GERD on PPI which is currently controlled. He is participating in lung Ca screening and last CT from 4/2022 showed e/o prior granulomatous disease, 4 mm GGO RUL stable. He has no significant emphysema. He presents today to discuss limitations related to recent changes at work. He works underground in a copper mine and has been out of work since 2/2/2023 due to acute onset SOB during work after a change in ventilation required while they are upgrading the ventilation system at work. Per patient and per Pinky Miller, [employer]  with whom I spoke to today, the ventilation is not OFF but is decreased and monitored continuously for adjustments by [facility]. They are able to relocate the patient to above ground work if necessary. He was seen by the occupational medicine PA at [facility] and cleared to return to work but not cleared to use a respirator without consulting with me. It is my understanding that the recommendation to wear a respirator was the solution to patient's symptoms experienced with recent changes in ventilation.    Past Medical History:   Diagnosis Date     Arthritis     Asthma without status asthmaticus 2020    Benign prostatic hyperplasia with urinary obstruction 3/3/2020    Bronchitis     Carotid artery stenosis, asymptomatic, right 2020    Cataract     Chest pain     Chest tightness     Constipation     COPD (chronic obstructive pulmonary disease) ([facility]) 2020    COPD (chronic obstructive pulmonary disease) ([facility]) 3/7/2020    Frequent urination     [place] measles     H. pylori infection 2023    Hearing difficulty     Heartburn     Hypertension     Incomplete emptying of bladder 3/3/2020    Intercostal pain, R side, musculoskeletal chondritis 2020    Lesion of penis 3/3/2020    Nocturia 3/3/2020    Painful joint     Raised prostate specific antigen 3/3/2020    Shortness of breath     Sweat, sweating, excessive     Vision loss     Wears glasses     Wheezing        Social History     Socioeconomic History    Marital status:      Spouse name: Not on file    Number of children: Not on file    Years of education: Not on file    Highest education level: Not on file   Occupational History    Not on file   Tobacco Use    Smoking status: Former     Packs/day: 1.00     Years: 40.00     Pack years: 40.00     Types: Cigarettes     Start date: 1980     Quit date: 2019     Years since quittin.0     Passive exposure: Past    Smokeless tobacco: Never   Vaping Use    Vaping Use: Never used   Substance and Sexual Activity    Alcohol use: Yes     Comment: moderately     Drug use: No    Sexual activity: Not Currently     Partners: Female   Other Topics Concern    Not on file   Social History Narrative    Not on file     Social Determinants of Health     Financial Resource Strain: Not on file   Food Insecurity: Not on file   Transportation Needs: Not on file   Physical Activity: Not on file   Stress: Not on file   Social Connections: Not on file   Intimate Partner Violence: Not on file   Housing Stability: Not on file       Family History    Problem Relation Age of Onset    No Known Problems Mother     Heart Attack Father 70    Heart Disease Father     Heart Attack Brother 30    Heart Disease Brother        Current Outpatient Medications on File Prior to Visit   Medication Sig Dispense Refill    esomeprazole (NEXIUM) 40 MG delayed-release capsule Take 40 mg by mouth every morning before breakfast.      fluticasone furoate-vilanterol (BREO ELLIPTA) 200-25 MCG/ACT AEROSOL POWDER, BREATH ACTIVATED Inhale 1 Puff every day. Rinse mouth after use. 1 Each 3    fluticasone (FLONASE) 50 MCG/ACT nasal spray Administer 1-2 Sprays into affected nostril(S) every day. Each nostril. 16 g 6    montelukast (SINGULAIR) 10 MG Tab Take 1 Tablet by mouth every evening. 30 Tablet 0    amlodipine-benazepril (LOTREL) 5-20 MG per capsule TAKE ONE CAPSULE BY MOUTH EVERY DAY 30 Capsule 6    aspirin EC (ECOTRIN) 81 MG Tablet Delayed Response Take 81 mg by mouth every day.      albuterol 108 (90 Base) MCG/ACT Aero Soln inhalation aerosol Inhale 2 Puffs by mouth every 6 hours as needed for Shortness of Breath. 1 Each 2    sulfamethoxazole-trimethoprim (BACTRIM DS) 800-160 MG tablet Take 1 Tablet by mouth 2 times a day. (Patient not taking: Reported on 3/1/2023)      rosuvastatin (CRESTOR) 20 MG Tab Take 1 Tablet by mouth every day. (Patient not taking: Reported on 3/1/2023) 90 Tablet 3    tadalafil (CIALIS) 20 MG tablet tadalafil 20 mg tablet   Take 1 tablet every day by oral route as needed. (Patient not taking: Reported on 2/8/2023)       No current facility-administered medications on file prior to visit.       Patient has no known allergies.      ROS:   Review of Systems   Constitutional:  Negative for chills, diaphoresis, fever, malaise/fatigue and weight loss.   HENT:  Negative for congestion, ear discharge, ear pain, hearing loss, nosebleeds, sinus pain, sore throat and tinnitus.    Eyes:  Negative for blurred vision, double vision, photophobia, pain, discharge and redness.  "  Respiratory:  Negative for cough, hemoptysis, sputum production, shortness of breath, wheezing and stridor.    Cardiovascular:  Negative for chest pain, palpitations, orthopnea, claudication, leg swelling and PND.   Gastrointestinal:  Negative for abdominal pain, constipation, diarrhea, heartburn, nausea and vomiting.   Genitourinary:  Negative for dysuria and urgency.   Musculoskeletal:  Negative for back pain, falls, joint pain, myalgias and neck pain.   Skin:  Negative for itching and rash.   Neurological:  Negative for dizziness, tremors, speech change, focal weakness, weakness and headaches.   Endo/Heme/Allergies:  Negative for environmental allergies.   Psychiatric/Behavioral:  Negative for depression.      BP (!) 142/68 (BP Location: Right arm, Patient Position: Sitting, BP Cuff Size: Adult)   Pulse 67   Resp 16   Ht 1.727 m (5' 8\")   Wt 73 kg (161 lb)   SpO2 97%   Physical Exam  Vitals reviewed.   Constitutional:       General: He is not in acute distress.     Appearance: Normal appearance. He is normal weight.   HENT:      Head: Normocephalic and atraumatic.      Right Ear: External ear normal.      Left Ear: External ear normal.      Nose: Nose normal. No congestion.      Mouth/Throat:      Mouth: Mucous membranes are moist.      Pharynx: Oropharynx is clear. No oropharyngeal exudate.   Eyes:      Extraocular Movements: Extraocular movements intact.      Conjunctiva/sclera: Conjunctivae normal.      Pupils: Pupils are equal, round, and reactive to light.   Cardiovascular:      Rate and Rhythm: Normal rate and regular rhythm.      Heart sounds: Normal heart sounds. No murmur heard.    No gallop.   Pulmonary:      Effort: Pulmonary effort is normal. No respiratory distress.      Breath sounds: Normal breath sounds. No wheezing or rales.   Abdominal:      General: There is no distension.      Palpations: Abdomen is soft.   Musculoskeletal:         General: Normal range of motion.      Cervical back: " Normal range of motion and neck supple.      Right lower leg: No edema.      Left lower leg: No edema.   Skin:     General: Skin is warm and dry.      Findings: No rash.   Neurological:      Mental Status: He is alert and oriented to person, place, and time.      Cranial Nerves: No cranial nerve deficit.   Psychiatric:         Mood and Affect: Mood normal.         Behavior: Behavior normal.       PFTs as reviewed by me personally: as per hPI    Imaging as reviewed by me personally:  as per hPI    Assessment:  1. Occupational exposure in workplace        2. Moderate persistent asthma without status asthmaticus without complication  Spirometry      3. Former smoker  CT-LUNG CANCER-SCREENING      4. Gastroesophageal reflux disease, unspecified whether esophagitis present            Plan:  This pt had acute respiratory issues with changes in ventilation at work in an underground mine. I cannot safely say that his sxs will be alleviated with a respirator. At this point, given his underlying asthma, my recommendation is that he be allowed to work above ground for the duration of the ventilation upgrade. Once the upgrade is complete, he can return to underground work.   Outside of acute symptoms which resolved with proper ventilation, sxs are chronic, now controlled on breo 200, montelukast and prn CINDY.   Tobacco free. Participating in lung Ca screening program. He is due for CT in April which I have ordered  Chronic and controlled on PPI  Return in about 6 months (around 9/1/2023) for spirometry.

## 2023-03-03 NOTE — TELEPHONE ENCOUNTER
Patient presented to office visit with forms for completion. Forms have been completed and emailed to HR as per forms. Scanned in media. Patient contacted and notified.         Left vm to the patient informed forms completed and emailed directly to Pinky. Will leave originals at the  for .

## 2023-03-03 NOTE — NON-PROVIDER
[facility] paperwork completed by Dr. Mathur and emailed to MARCIA Vance. Scanned in media.

## 2023-03-16 ENCOUNTER — OFFICE VISIT (OUTPATIENT)
Dept: VASCULAR LAB | Facility: MEDICAL CENTER | Age: 68
End: 2023-03-16
Attending: FAMILY MEDICINE
Payer: COMMERCIAL

## 2023-03-16 ENCOUNTER — DOCUMENTATION (OUTPATIENT)
Dept: VASCULAR LAB | Facility: MEDICAL CENTER | Age: 68
End: 2023-03-16
Payer: COMMERCIAL

## 2023-03-16 VITALS
HEIGHT: 68 IN | SYSTOLIC BLOOD PRESSURE: 136 MMHG | WEIGHT: 163.9 LBS | DIASTOLIC BLOOD PRESSURE: 69 MMHG | BODY MASS INDEX: 24.84 KG/M2 | HEART RATE: 58 BPM

## 2023-03-16 DIAGNOSIS — Z79.02 ANTIPLATELET OR ANTITHROMBOTIC LONG-TERM USE: ICD-10-CM

## 2023-03-16 DIAGNOSIS — I10 PRIMARY HYPERTENSION: ICD-10-CM

## 2023-03-16 DIAGNOSIS — E78.49 OTHER HYPERLIPIDEMIA: ICD-10-CM

## 2023-03-16 DIAGNOSIS — Z87.891 FORMER SMOKER: ICD-10-CM

## 2023-03-16 DIAGNOSIS — Z86.73 HISTORY OF TIA (TRANSIENT ISCHEMIC ATTACK): ICD-10-CM

## 2023-03-16 DIAGNOSIS — I65.21 CAROTID ARTERY STENOSIS, ASYMPTOMATIC, RIGHT: ICD-10-CM

## 2023-03-16 PROCEDURE — 99212 OFFICE O/P EST SF 10 MIN: CPT

## 2023-03-16 PROCEDURE — 99214 OFFICE O/P EST MOD 30 MIN: CPT | Performed by: FAMILY MEDICINE

## 2023-03-16 RX ORDER — ERGOCALCIFEROL 1.25 MG/1
CAPSULE ORAL
COMMUNITY
End: 2023-05-20

## 2023-03-16 ASSESSMENT — ENCOUNTER SYMPTOMS
FEVER: 0
PALPITATIONS: 0
WHEEZING: 0
COUGH: 0
ORTHOPNEA: 0
CHILLS: 0
CLAUDICATION: 0
SHORTNESS OF BREATH: 0
SPUTUM PRODUCTION: 0
PND: 0
HEMOPTYSIS: 0

## 2023-03-16 ASSESSMENT — FIBROSIS 4 INDEX: FIB4 SCORE: 2.44

## 2023-03-16 NOTE — PROGRESS NOTES
FOLLOW UP VASCULAR VISIT  23   Adrián Sheldon is a male  who presents for  Chief Complaint   Patient presents with    Follow-Up       initially referred by Beatrice Ireland P.Ac for eval and med mgmt of carotid artery disease     Subjective    Interval hx/concerns: last seen 2023, reports no changes  Believes he had duplex at [facility] - no current imaging in media     Carotid artery disease:   denies CVA/TIA like symptoms  No sx     HTN:  Home BP los/70s   Adherence to current HTN meds: compliant all of the time    Hyperlipidemia:    Tolerating rosuvastatin   Current treatment: none     Antiplatelet/anticoagulation: Yes, Details: ASA 81mg daily  ,       Current Outpatient Medications:     vitamin D2 (Ergocalciferol), Take  by mouth every 7 days., Taking    esomeprazole, 40 mg, Oral, QAM AC, Taking    fluticasone furoate-vilanterol, 1 Puff, Inhalation, DAILY, Taking    fluticasone, 1-2 Spray, Nasal, DAILY, Taking    montelukast, 10 mg, Oral, Q EVENING, Taking    amlodipine-benazepril, TAKE ONE CAPSULE BY MOUTH EVERY DAY, Taking    aspirin EC, 81 mg, Oral, DAILY, Taking    albuterol, 2 Puff, Inhalation, Q6HRS PRN, PRN    rosuvastatin, 20 mg, Oral, QDAY (Patient not taking: Reported on 3/1/2023), Not Taking     No Known Allergies     Social History     Tobacco Use    Smoking status: Former     Packs/day: 1.00     Years: 40.00     Pack years: 40.00     Types: Cigarettes     Start date: 1980     Quit date: 2019     Years since quittin.0     Passive exposure: Past    Smokeless tobacco: Never   Vaping Use    Vaping Use: Never used   Substance Use Topics    Alcohol use: Yes     Comment: moderately     Drug use: No     DIET AND EXERCISE:  Weight Change:stable   BMI Readings from Last 5 Encounters:   23 24.92 kg/m²   23 24.48 kg/m²   23 24.66 kg/m²   23 24.63 kg/m²   23 24.18 kg/m²      Diet: common adult  Exercise: no regular exercise program     Review of Systems  "  Constitutional:  Negative for chills, fever and malaise/fatigue.   Respiratory:  Negative for cough, hemoptysis, sputum production, shortness of breath and wheezing.    Cardiovascular:  Negative for chest pain, palpitations, orthopnea, claudication, leg swelling and PND.          Objective     Vitals:    03/16/23 1427 03/16/23 1431   BP: 138/69 136/69   BP Location: Left arm Left arm   Patient Position: Sitting Sitting   BP Cuff Size: Adult Adult   Pulse: (!) 58    Weight: 74.3 kg (163 lb 14.4 oz)    Height: 1.727 m (5' 8\")       BP Readings from Last 5 Encounters:   03/16/23 136/69   03/01/23 (!) 142/68   02/08/23 132/60   02/07/23 138/58   01/14/23 138/85      Body mass index is 24.92 kg/m².  Physical Exam  Vitals reviewed.   Constitutional:       General: He is not in acute distress.     Appearance: He is well-developed. He is not diaphoretic.   HENT:      Head: Normocephalic and atraumatic.   Neck:      Thyroid: No thyromegaly.      Vascular: No JVD.   Cardiovascular:      Rate and Rhythm: Normal rate and regular rhythm.      Pulses: Normal pulses.      Heart sounds: No murmur heard.  Pulmonary:      Effort: No respiratory distress.      Breath sounds: Normal breath sounds. No wheezing or rales.   Musculoskeletal:         General: No swelling or tenderness.   Neurological:      General: No focal deficit present.      Mental Status: He is oriented to person, place, and time.      Cranial Nerves: No cranial nerve deficit.      Coordination: Coordination normal.   Psychiatric:         Mood and Affect: Mood normal.         Behavior: Behavior normal.     Lab Results   Component Value Date    CHOLSTRLTOT 141 02/27/2023    LDL 73 02/27/2023    HDL 55 02/27/2023    TRIGLYCERIDE 67 02/27/2023      Lab Results   Component Value Date    SODIUM 140 02/27/2023    POTASSIUM 4.8 02/27/2023    CHLORIDE 104 02/27/2023    CO2 24 02/27/2023    GLUCOSE 137 (H) 02/27/2023    BUN 11 02/27/2023    CREATININE 0.98 02/27/2023    " IFAFRICA >60 10/28/2021    IFNOTAFR >60 10/28/2021        Lab Results   Component Value Date    WBC 5.1 02/27/2023    RBC 5.16 02/27/2023    HEMOGLOBIN 16.1 02/27/2023    HEMATOCRIT 46.3 02/27/2023    MCV 89.7 02/27/2023    MCH 31.2 02/27/2023    MCHC 34.8 02/27/2023    MPV 10.5 02/27/2023     VASCULAR IMAGING:     CTA neck 11/2018  Unremarkable CT angiogram of the neck. No high-grade stenosis, large vessel occlusion, aneurysm or dissection.    CTA head 11/2018   1.  No large vessel occlusion, high-grade stenosis or aneurysm of the [place] of [place].  2.  No CT evidence of acute infarct, hemorrhage or mass.  1.  Cerebral blood flow less than 30% likely representing completed infarct = 0 mL.   2.  T Max more than 6 seconds likely representing combination of completed infarct and ischemia = 0 mL.   3.  Mismatched volume likely representing ischemic brain/penumbra = 0 mL.   4.  Please note that the cerebral perfusion was performed on the limited brain tissue around the basal ganglia region. Infarct/ischemia outside the CT perfusion sections can be missed in this study.    MRI brain 2018   1.  No acute abnormality.  2.  Mild cerebral atrophy.  3.  Minimal chronic microvascular ischemic disease.    MPI 3/2020   NUCLEAR IMAGING INTERPRETATION   No evidence of significant jeopardized viable myocardium or prior myocardial    infarction.   Normal left ventricular size, ejection fraction, and wall motion.    Echo 3/2020  Normal left ventricular systolic function. Left ventricular ejection   fraction is visually estimated to be 60%.   Normal diastolic function.  Normal inferior vena cava size without inspiratory collapse.    CT chest 5/2020  1.  There is no lung mass or destructive bony process to explain the patient's right chest wall pain.  2.  There is a nonspecific 4 mm right upper lobe lung nodule, probably an area of scarring.  3.  There is a calcified granuloma in the left lower lobe with small calcified mediastinal  nodes consistent with prior granulomatous inflammatory process.  4.  There is aortic atherosclerosis.    Carotid duplex 7/15/2020 ([place])  Moderate 40-69% R ICA ,  No stenosis L ICA   bilat moderate atherosclerotic changes     Carotid duplex 7/13/21  Mildly elevated velocity in the right proximal ICA may suggest 50-69%    stenosis.    Carotid duplex - done at [facility] - requested          Medical Decision Making:  Today's Assessment / Status / Plan:     1. Carotid artery stenosis, asymptomatic, right  US-CAROTID DOPPLER BILAT      2. Other hyperlipidemia        3. Primary hypertension        4. Antiplatelet or antithrombotic long-term use        5. History of TIA (transient ischemic attack)  US-CAROTID DOPPLER BILAT      6. Former smoker          Patient Type: Primary Prevention    Etiology of Established CVD if Present:     1) R ICA stenosis, moderate, no symptoms.   L ICA - normal   No indications or surgical intervention at this time   - continue med mgmt   - pending in April at [facility]   - will base surveillance on results, consider CTA if progression    2) TIA, episode of ataxia - stable   Normal MRI, CTA head/neck   - continue med mgmt     3)  Palpitations - resolved     Antithrombotic therapy: continue ASA 81mg daily     Lipid Management:   Qualifies for Statin Therapy Based on 2018 ACC/AHA Guidelines: yes  The ASCVD Risk score (Je DK, et al., 2019) failed to calculate.   Currently on Statin: rosuvastatin 20mg   Tx goal:  non-HDL <130, LDL-C <100 (optional LDLc <70)  At goal:  yes, 2/2023   Plan:  - reinforced ongoing TLC measures   - update labs prior to next appt   - Continue rosuvastatin 20mg daily with vit D3 5,000 units daily     Blood Pressure Management:   Goal: ACC/AHA (2017) goal <130/80  Home BP at goal:  yes  Office BP at goal:  yes, mild DBP high     Plan:   Monitoring:   - monitor lytes/gfr routinely and microalbumin prior to next appt  - contact office if BP consistently >140/>90 to discussion  of tx adjustments   Medications:  - continue amlodipine/benazepril 5/20mg daily     Glycemic Status: prediabetic A1c 6.0 7/2022  Lab Results   Component Value Date    HBA1C 6.0 (A) 07/19/2022   - continue med mgmt  Monitor labs annually     LIFESTYLE INTERVENTIONS:    SMOKING: Stages of Change: Maintenance (sustained change >6mo)     reports that he quit smoking about 4 years ago. His smoking use included cigarettes. He started smoking about 43 years ago. He has a 40.00 pack-year smoking history. He has been exposed to tobacco smoke. He has never used smokeless tobacco.   - continued complete avoidance of all tobacco products     PHYSICAL ACTIVITY: continue healthy activity to improve CV fitness.  In general, targeting >150min/week of moderate-level activity or as much as tolerated in light of functional status and co-morbidities     WEIGHT MANAGEMENT AND NUTRITION: Mediterranean style dietary approach       Other:  none     Instructed to follow-up with PCP for remainder of adult medical needs: yes  We will partner with other providers in the management of established vascular disease and cardiometabolic risk factors.    Studies to Be Obtained: carotid duplex - requested copy - aprn to track   Labs to Be Obtained: as noted noted     Follow up in:  6mo, sooner prn       Douglas Parikh M.D.  [facility]   [facility]

## 2023-03-16 NOTE — PROGRESS NOTES
Called [facility] at [phone], confirmed Carotid imaging done at facility    Requested STAT records to be faxed  Pending results

## 2023-03-20 ENCOUNTER — DOCUMENTATION (OUTPATIENT)
Dept: VASCULAR LAB | Facility: MEDICAL CENTER | Age: 68
End: 2023-03-20
Payer: COMMERCIAL

## 2023-03-21 NOTE — PROGRESS NOTES
Carotid duplex from [facility] suggest mild to moderate atherosclerotic plaque without hemodynamically significant stenosis  Previously was seen to have moderate stenosis on duplex  We will recheck carotid duplex in 2 years  MA to inform patient  APN to update surveillance calendar    Follow-up as scheduled    Michael Bloch, MD  Vascular Medicine

## 2023-03-22 ENCOUNTER — TELEPHONE (OUTPATIENT)
Dept: SLEEP MEDICINE | Facility: MEDICAL CENTER | Age: 68
End: 2023-03-22

## 2023-03-22 NOTE — TELEPHONE ENCOUNTER
MEDICATION PRIOR AUTHORIZATION NEEDED:    1. Name of Medication: Breo ellipta 200-25 mcg    2. Requested By (Name of Pharmacy): [facility]     3. Is insurance on file current? yes    4. What is the name & phone number of the 3rd party payor? [employer]

## 2023-03-27 ENCOUNTER — OFFICE VISIT (OUTPATIENT)
Dept: URGENT CARE | Facility: PHYSICIAN GROUP | Age: 68
End: 2023-03-27
Payer: COMMERCIAL

## 2023-03-27 VITALS
TEMPERATURE: 97.9 F | BODY MASS INDEX: 24.25 KG/M2 | HEART RATE: 62 BPM | HEIGHT: 68 IN | RESPIRATION RATE: 16 BRPM | DIASTOLIC BLOOD PRESSURE: 71 MMHG | OXYGEN SATURATION: 100 % | WEIGHT: 160 LBS | SYSTOLIC BLOOD PRESSURE: 132 MMHG

## 2023-03-27 DIAGNOSIS — J02.9 VIRAL PHARYNGITIS: ICD-10-CM

## 2023-03-27 DIAGNOSIS — J02.9 SORE THROAT: ICD-10-CM

## 2023-03-27 LAB
FLUAV RNA SPEC QL NAA+PROBE: NEGATIVE
FLUBV RNA SPEC QL NAA+PROBE: NEGATIVE
RSV RNA SPEC QL NAA+PROBE: NEGATIVE
S PYO DNA SPEC NAA+PROBE: NOT DETECTED
SARS-COV-2 RNA RESP QL NAA+PROBE: NEGATIVE

## 2023-03-27 PROCEDURE — 87651 STREP A DNA AMP PROBE: CPT | Performed by: STUDENT IN AN ORGANIZED HEALTH CARE EDUCATION/TRAINING PROGRAM

## 2023-03-27 PROCEDURE — 0241U POCT CEPHEID COV-2, FLU A/B, RSV - PCR: CPT | Performed by: STUDENT IN AN ORGANIZED HEALTH CARE EDUCATION/TRAINING PROGRAM

## 2023-03-27 PROCEDURE — 99213 OFFICE O/P EST LOW 20 MIN: CPT | Mod: CS | Performed by: STUDENT IN AN ORGANIZED HEALTH CARE EDUCATION/TRAINING PROGRAM

## 2023-03-27 ASSESSMENT — FIBROSIS 4 INDEX: FIB4 SCORE: 2.48

## 2023-03-27 NOTE — PROGRESS NOTES
Subjective:   CHIEF COMPLAINT  Chief Complaint   Patient presents with    Pharyngitis     Sore on right side only. X 2 days acid reflux       HPI  Adrián Sheldon is a 68 y.o. male who presents with a chief complaint of right-sided sore throat x2 days.  He has been gargling salt water and using throat lozenges which have provided limited relief of symptoms.  Positive ROS for cough and tactile fever.  No wheezing or shortness of breath.  No nausea or vomiting.  No sick contacts.  History of COPD.    REVIEW OF SYSTEMS  General: no fever or chills  GI: no nausea or vomiting  See HPI for further details.    PAST MEDICAL HISTORY  Patient Active Problem List    Diagnosis Date Noted    Gastroesophageal reflux disease without esophagitis 02/08/2023    H. pylori infection 02/08/2023    History of BPH 02/08/2023    Mild persistent asthma without complication 02/07/2023    History of TIA (transient ischemic attack) 08/28/2020    Other hyperlipidemia 08/28/2020    Former smoker 08/28/2020    Carotid artery stenosis, asymptomatic, right 08/28/2020    Asthma without status asthmaticus 03/07/2020    HTN (hypertension) 11/22/2018       SURGICAL HISTORY   has a past surgical history that includes laminotomy (06/2022) and prostatectomy, radical retro (12/2022).    ALLERGIES  No Known Allergies    CURRENT MEDICATIONS  Home Medications       Reviewed by Franki Avelar D.O. (Physician) on 03/27/23 at 1458  Med List Status: <None>     Medication Last Dose Status   albuterol 108 (90 Base) MCG/ACT Aero Soln inhalation aerosol Taking Active   amlodipine-benazepril (LOTREL) 5-20 MG per capsule Taking Active   aspirin EC (ECOTRIN) 81 MG Tablet Delayed Response Taking Active   esomeprazole (NEXIUM) 40 MG delayed-release capsule Taking Active   fluticasone (FLONASE) 50 MCG/ACT nasal spray Taking Active   fluticasone furoate-vilanterol (BREO ELLIPTA) 200-25 MCG/ACT AEROSOL POWDER, BREATH ACTIVATED Taking Active   montelukast (SINGULAIR) 10 MG  "Tab Taking Active   rosuvastatin (CRESTOR) 20 MG Tab Not Taking Active   vitamin D2, Ergocalciferol, (DRISDOL) 1.25 MG (93162 UT) Cap capsule Taking Active                    SOCIAL HISTORY  Social History     Tobacco Use    Smoking status: Former     Packs/day: 1.00     Years: 40.00     Pack years: 40.00     Types: Cigarettes     Start date: 1980     Quit date: 2019     Years since quittin.0     Passive exposure: Past    Smokeless tobacco: Never   Vaping Use    Vaping Use: Never used   Substance and Sexual Activity    Alcohol use: Yes     Comment: moderately     Drug use: No    Sexual activity: Not Currently     Partners: Female       FAMILY HISTORY  Family History   Problem Relation Age of Onset    No Known Problems Mother     Heart Attack Father 70    Heart Disease Father     Heart Attack Brother 30    Heart Disease Brother           Objective:   PHYSICAL EXAM  VITAL SIGNS: /71   Pulse 62   Temp 36.6 °C (97.9 °F) (Temporal)   Resp 16   Ht 1.727 m (5' 8\")   Wt 72.6 kg (160 lb)   SpO2 100%   BMI 24.33 kg/m²     Gen: no acute distress, normal voice  Skin: dry, intact, moist mucosal membranes  Eyes: No conjunctival injection b/l  Neck: Normal range of motion. No meningeal signs.   ENT: Minimal oropharyngeal erythema without exudates.  Uvula midline.  No tonsillar edema.  Right anterior cervical lymphadenopathy.  Minimal erythema of right tympanic membrane without any bulging or effusion; normal light reflex.  Left TM, intact without bulging or erythema.  Lungs: No increased work of breathing.  CTAB w/ symmetric expansion  CV: RRR w/o murmurs or clicks  Psych: normal affect, normal judgement, alert, awake    Assessment/Plan:     1. Sore throat  POCT GROUP A STREP, PCR    POCT CoV-2, Flu A/B, RSV by PCR      2. Viral pharyngitis        Signs and symptoms consistent with a viral respiratory infection should be self-limiting.  Patient is afebrile with an oxygen saturation 100%.  Lungs are clear " to auscultation bilaterally.  Signs and symptoms are not consistent with an acute COPD exacerbation.  Recommended symptomatic treatment with Tylenol and low-dose ibuprofen as needed.  Return to urgent care any new/worsening symptoms or further questions or concerns.  Patient understood everything discussed.  All questions were answered.      Differential diagnosis, natural history, supportive care, and indications for immediate follow-up discussed. All questions answered. Patient agrees with the plan of care.    Follow-up as needed if symptoms worsen or fail to improve to PCP, Urgent care or Emergency Room.    Please note that this dictation was created using voice recognition software. I have made a reasonable attempt to correct obvious errors, but I expect that there are errors of grammar and possibly content that I did not discover before finalizing the note.

## 2023-03-29 DIAGNOSIS — J45.40 MODERATE PERSISTENT ASTHMA WITHOUT STATUS ASTHMATICUS WITHOUT COMPLICATION: ICD-10-CM

## 2023-03-29 NOTE — TELEPHONE ENCOUNTER
Per notes 3/22/23 a PA has been submitted to insurance. No response yet.     I contacted the patient previous medication was sent to [facility]. Patient is requesting medication be sent to [facility]. Informed per our records a PA was request. Has been submitted dated 3/22/23. We have not received a response from insurance. We can send the medication no guarantee will be covered. Patient aware.

## 2023-03-29 NOTE — TELEPHONE ENCOUNTER
VOICEMAIL  1. Caller Name: patient                       Call Back Number: [phone] (home) [phone] (work)    2. Message: patient left multiple voice messages requesting prescription of Breo 200 to pharmacy in [place].     3. Patient approves office to leave a detailed voicemail/MyChart message: N\A

## 2023-03-29 NOTE — TELEPHONE ENCOUNTER
Caller Name: Adrián Sheldon                 Call Back Number: [phone] (home) [phone] (work)        Patient approves a detailed voicemail message: N\A    Have we ever prescribed this med? Yes.  If yes, what date? 2/7/23     Last OV: 3/1/22 Dr. Mathur   Return in about 6 months (around 9/1/2023) for spirometry.  Next OV: NONE     DX:    Moderate persistent asthma without status asthmaticus without complication        Medications:  Current Outpatient Medications   Medication Sig Dispense Refill    vitamin D2, Ergocalciferol, (DRISDOL) 1.25 MG (80264 UT) Cap capsule Take  by mouth every 7 days.      esomeprazole (NEXIUM) 40 MG delayed-release capsule Take 40 mg by mouth every morning before breakfast.      fluticasone furoate-vilanterol (BREO ELLIPTA) 200-25 MCG/ACT AEROSOL POWDER, BREATH ACTIVATED Inhale 1 Puff every day. Rinse mouth after use. 1 Each 3    fluticasone (FLONASE) 50 MCG/ACT nasal spray Administer 1-2 Sprays into affected nostril(S) every day. Each nostril. 16 g 6    montelukast (SINGULAIR) 10 MG Tab Take 1 Tablet by mouth every evening. 30 Tablet 0    amlodipine-benazepril (LOTREL) 5-20 MG per capsule TAKE ONE CAPSULE BY MOUTH EVERY DAY 30 Capsule 6    rosuvastatin (CRESTOR) 20 MG Tab Take 1 Tablet by mouth every day. (Patient not taking: Reported on 3/1/2023) 90 Tablet 3    aspirin EC (ECOTRIN) 81 MG Tablet Delayed Response Take 81 mg by mouth every day.      albuterol 108 (90 Base) MCG/ACT Aero Soln inhalation aerosol Inhale 2 Puffs by mouth every 6 hours as needed for Shortness of Breath. 1 Each 2     No current facility-administered medications for this visit.

## 2023-03-30 RX ORDER — FLUTICASONE FUROATE AND VILANTEROL 200; 25 UG/1; UG/1
1 POWDER RESPIRATORY (INHALATION) DAILY
Qty: 1 EACH | Refills: 5 | Status: CANCELLED | OUTPATIENT
Start: 2023-03-30

## 2023-03-30 RX ORDER — FLUTICASONE PROPIONATE AND SALMETEROL XINAFOATE 230; 21 UG/1; UG/1
2 AEROSOL, METERED RESPIRATORY (INHALATION) 2 TIMES DAILY
Qty: 1 EACH | Refills: 11 | Status: SHIPPED
Start: 2023-03-30 | End: 2023-05-20

## 2023-04-10 DIAGNOSIS — J45.40 MODERATE PERSISTENT ASTHMA WITHOUT STATUS ASTHMATICUS WITHOUT COMPLICATION: ICD-10-CM

## 2023-04-10 DIAGNOSIS — Z57.9 OCCUPATIONAL EXPOSURE IN WORKPLACE: ICD-10-CM

## 2023-04-10 SDOH — HEALTH STABILITY - PHYSICAL HEALTH: OCCUPATIONAL EXPOSURE TO UNSPECIFIED RISK FACTOR: Z57.9

## 2023-04-13 ENCOUNTER — TELEPHONE (OUTPATIENT)
Dept: SLEEP MEDICINE | Facility: MEDICAL CENTER | Age: 68
End: 2023-04-13
Payer: COMMERCIAL

## 2023-04-13 DIAGNOSIS — J45.40 MODERATE PERSISTENT ASTHMA WITHOUT STATUS ASTHMATICUS WITHOUT COMPLICATION: ICD-10-CM

## 2023-04-13 NOTE — TELEPHONE ENCOUNTER
MEDICATION PRIOR AUTHORIZATION NEEDED:    1. Name of Medication: Fluticasone-Salmeterol 230/21 mcg    2. Requested By (Name of Pharmacy): [facility]     3. Is insurance on file current? yes    4. What is the name & phone number of the 3rd party payor? [employer]

## 2023-04-20 DIAGNOSIS — J45.20 MILD INTERMITTENT REACTIVE AIRWAY DISEASE WITHOUT COMPLICATION: ICD-10-CM

## 2023-04-21 RX ORDER — FLUTICASONE FUROATE AND VILANTEROL TRIFENATATE 100; 25 UG/1; UG/1
POWDER RESPIRATORY (INHALATION)
Qty: 60 EACH | Refills: 6 | OUTPATIENT
Start: 2023-04-21

## 2023-04-21 NOTE — TELEPHONE ENCOUNTER
Have we ever prescribed this med? Yes.  If yes, what date? 02/07/23    Last OV: 03/01/23 with Dr vale    Next OV: No Pending appt.     DX:     Medications:   Requested Prescriptions     Pending Prescriptions Disp Refills    BREO ELLIPTA 100-25 MCG/ACT AEROSOL POWDER, BREATH ACTIVATED [Pharmacy Med Name: BREO ELLIPTA 100-25MCG/ACT AEPB] 60 Each 0     Sig: INHALE ONE PUFF BY MOUTH EVERY DAY

## 2023-04-24 ENCOUNTER — APPOINTMENT (OUTPATIENT)
Dept: RADIOLOGY | Facility: IMAGING CENTER | Age: 68
End: 2023-04-24
Attending: INTERNAL MEDICINE
Payer: COMMERCIAL

## 2023-04-24 ENCOUNTER — OFFICE VISIT (OUTPATIENT)
Dept: URGENT CARE | Facility: PHYSICIAN GROUP | Age: 68
End: 2023-04-24
Payer: COMMERCIAL

## 2023-04-24 ENCOUNTER — NON-PROVIDER VISIT (OUTPATIENT)
Dept: URGENT CARE | Facility: PHYSICIAN GROUP | Age: 68
End: 2023-04-24
Payer: COMMERCIAL

## 2023-04-24 VITALS
WEIGHT: 166 LBS | HEIGHT: 68 IN | BODY MASS INDEX: 25.16 KG/M2 | SYSTOLIC BLOOD PRESSURE: 134 MMHG | TEMPERATURE: 97.2 F | RESPIRATION RATE: 12 BRPM | HEART RATE: 80 BPM | OXYGEN SATURATION: 96 % | DIASTOLIC BLOOD PRESSURE: 80 MMHG

## 2023-04-24 DIAGNOSIS — J45.41 MODERATE PERSISTENT ASTHMA WITH EXACERBATION: ICD-10-CM

## 2023-04-24 DIAGNOSIS — Z57.9 OCCUPATIONAL EXPOSURE IN WORKPLACE: ICD-10-CM

## 2023-04-24 PROCEDURE — 71046 X-RAY EXAM CHEST 2 VIEWS: CPT | Mod: TC,FY | Performed by: PHYSICIAN ASSISTANT

## 2023-04-24 PROCEDURE — 99213 OFFICE O/P EST LOW 20 MIN: CPT | Performed by: PHYSICIAN ASSISTANT

## 2023-04-24 RX ORDER — SUCRALFATE 1 G/1
TABLET ORAL
COMMUNITY
Start: 2023-02-22 | End: 2023-05-20

## 2023-04-24 RX ORDER — METHYLPREDNISOLONE 4 MG/1
TABLET ORAL
Qty: 21 TABLET | Refills: 0 | Status: SHIPPED
Start: 2023-04-24 | End: 2023-05-20

## 2023-04-24 RX ORDER — SUCRALFATE 1 G/1
TABLET ORAL
COMMUNITY
End: 2023-05-20

## 2023-04-24 SDOH — HEALTH STABILITY - PHYSICAL HEALTH: OCCUPATIONAL EXPOSURE TO UNSPECIFIED RISK FACTOR: Z57.9

## 2023-04-24 ASSESSMENT — ENCOUNTER SYMPTOMS
WHEEZING: 1
EYE REDNESS: 0
EYE DISCHARGE: 0
FEVER: 0
COUGH: 1
NAUSEA: 0
VOMITING: 0

## 2023-04-24 ASSESSMENT — FIBROSIS 4 INDEX: FIB4 SCORE: 2.48

## 2023-04-24 NOTE — TELEPHONE ENCOUNTER
VOICEMAIL  1. Caller Name: [facility] (NO name)                       Call Back Number: [phone]    2. Message: per pharmacy, patient contacted regarding breo prescription or alternative.     3. Patient approves office to leave a detailed voicemail/MyChart message: N\A        I spoke to Mi at [facility] both Advair HFA and Breo are not covered under insurance. Please send an alternative. PA has been started for both medications.

## 2023-04-24 NOTE — PROGRESS NOTES
Subjective     Adrián Sheldon is a 68 y.o. male who presents with Medication Refill (Breo possible or another inhaler )            HPI    This is a new problem.  The patient presents to clinic requesting a refill of his Breo inhaler.  The patient states he is seen by pulmonology for his asthma.  The patient states he was previously prescribed a Breo inhaler for his asthma.  The patient states during his last visit to pulmonology the increased his Breo inhaler from Breo 100 to Breo 200.  The patient states the Breo 200 was not covered by his insurance.  The patient states he has not had his Breo inhaler for the past 2 months.  The patient states he is currently taking Advair.  The patient states over the past several months he has been experiencing increased asthma like symptoms with an associated cough, intermittent wheezing, and intermittent chest tightness.  The patient reports no associated fever.  The patient states he contacted his pulmonologist regarding his refill of the Breo inhaler, but has not heard back.  The patient is currently not taking any additional OTC medications for his current symptoms.      PMH:  has a past medical history of Arthritis, Asthma without status asthmaticus (03/07/2020), Benign prostatic hyperplasia with urinary obstruction (3/3/2020), Bronchitis, Carotid artery stenosis, asymptomatic, right (08/28/2020), Cataract, Chest pain, Chest tightness, Constipation, COPD (chronic obstructive pulmonary disease) ([facility]) (03/07/2020), COPD (chronic obstructive pulmonary disease) ([facility]) (3/7/2020), Frequent urination, [place] measles, H. pylori infection (2/8/2023), Hearing difficulty, Heartburn, Hypertension, Incomplete emptying of bladder (3/3/2020), Intercostal pain, R side, musculoskeletal chondritis (4/20/2020), Lesion of penis (3/3/2020), Nocturia (3/3/2020), Painful joint, Raised prostate specific antigen (3/3/2020), Shortness of breath, Sweat, sweating, excessive, Vision loss, Wears  glasses, and Wheezing.    He has no past medical history of Anginal syndrome ([facility]), Arrhythmia, At risk for sleep apnea, Back pain, Blood clotting disorder ([facility]), Cancer ([facility]), Congestive heart failure (HCC), Cough, Diabetes (HCC), Dialysis patient ([facility]), Disorder of thyroid, Fall, Glaucoma, Gynecological disorder, Heart murmur, Heart valve disease, Indigestion, Jaundice, Myocardial infarct ([facility]), Pacemaker, Painful breathing, Pneumonia, Psychiatric problem, Renal disorder, Rheumatic fever, Seizure ([facility]), Sputum production, or Urinary incontinence.  MEDS:   Current Outpatient Medications:     nystatin (MYCOSTATIN) 514058 UNIT/ML Suspension, nystatin 100,000 unit/mL oral suspension, Disp: , Rfl:     sucralfate (CARAFATE) 1 GM Tab, sucralfate 1 gram tablet, Disp: , Rfl:     sucralfate (CARAFATE) 1 GM Tab, , Disp: , Rfl:     methylPREDNISolone (MEDROL DOSEPAK) 4 MG Tablet Therapy Pack, Follow schedule on package instructions., Disp: 21 Tablet, Rfl: 0    fluticasone-salmeterol (ADVAIR HFA) 230-21 MCG/ACT inhaler, Inhale 2 Puffs 2 times a day. Use with spacer.  Rinse mouth after each use., Disp: 1 Each, Rfl: 11    vitamin D2, Ergocalciferol, (DRISDOL) 1.25 MG (91440 UT) Cap capsule, Take  by mouth every 7 days., Disp: , Rfl:     esomeprazole (NEXIUM) 40 MG delayed-release capsule, Take 40 mg by mouth every morning before breakfast., Disp: , Rfl:     fluticasone furoate-vilanterol (BREO ELLIPTA) 200-25 MCG/ACT AEROSOL POWDER, BREATH ACTIVATED, Inhale 1 Puff every day. Rinse mouth after use., Disp: 1 Each, Rfl: 3    fluticasone (FLONASE) 50 MCG/ACT nasal spray, Administer 1-2 Sprays into affected nostril(S) every day. Each nostril., Disp: 16 g, Rfl: 6    montelukast (SINGULAIR) 10 MG Tab, Take 1 Tablet by mouth every evening., Disp: 30 Tablet, Rfl: 0    amlodipine-benazepril (LOTREL) 5-20 MG per capsule, TAKE ONE CAPSULE BY MOUTH EVERY DAY, Disp: 30 Capsule, Rfl: 6    rosuvastatin (CRESTOR) 20 MG Tab, Take 1 Tablet by  "mouth every day., Disp: 90 Tablet, Rfl: 3    aspirin EC (ECOTRIN) 81 MG Tablet Delayed Response, Take 81 mg by mouth every day., Disp: , Rfl:     albuterol 108 (90 Base) MCG/ACT Aero Soln inhalation aerosol, Inhale 2 Puffs by mouth every 6 hours as needed for Shortness of Breath., Disp: 1 Each, Rfl: 2  ALLERGIES: No Known Allergies  SURGHX:   Past Surgical History:   Procedure Laterality Date    PROSTATECTOMY, RADICAL RETRO  12/2022    LAMINOTOMY  06/2022    L3-L4     SOCHX:  reports that he quit smoking about 4 years ago. His smoking use included cigarettes. He started smoking about 43 years ago. He has a 40.00 pack-year smoking history. He has been exposed to tobacco smoke. He has never used smokeless tobacco. He reports current alcohol use. He reports that he does not use drugs.  FH: Family history was reviewed, no pertinent findings to report      Review of Systems   Constitutional:  Negative for fever.   HENT:  Positive for congestion.    Eyes:  Negative for discharge and redness.   Respiratory:  Positive for cough and wheezing (The patient reports intermittent wheezing.).    Gastrointestinal:  Negative for nausea and vomiting.            Objective     /80   Pulse 80   Temp 36.2 °C (97.2 °F) (Temporal)   Resp 12   Ht 1.727 m (5' 8\")   Wt 75.3 kg (166 lb)   SpO2 96%   BMI 25.24 kg/m²      Physical Exam  Constitutional:       General: He is not in acute distress.     Appearance: Normal appearance. He is not ill-appearing.   HENT:      Head: Normocephalic and atraumatic.      Right Ear: Ear canal and external ear normal.      Left Ear: External ear normal.   Eyes:      Extraocular Movements: Extraocular movements intact.      Conjunctiva/sclera: Conjunctivae normal.   Cardiovascular:      Rate and Rhythm: Normal rate and regular rhythm.      Heart sounds: Normal heart sounds.   Pulmonary:      Effort: Pulmonary effort is normal. No respiratory distress.      Breath sounds: Normal breath sounds. No " wheezing.   Musculoskeletal:         General: Normal range of motion.      Cervical back: Normal range of motion and neck supple.   Skin:     General: Skin is warm and dry.   Neurological:      Mental Status: He is alert and oriented to person, place, and time.                           Assessment & Plan          1. Moderate persistent asthma with exacerbation  - methylPREDNISolone (MEDROL DOSEPAK) 4 MG Tablet Therapy Pack; Follow schedule on package instructions.  Dispense: 21 Tablet; Refill: 0    The patient's presenting symptoms and physical exam findings are consistent with an acute asthma exacerbation.  This is an acute exacerbation of a chronic condition.  The patient's physical exam today in clinic was normal.  The patient's lungs are clear auscultation without wheezing, and his pulse ox was within normal limits.  The patient is requesting a refill of his Breo inhaler at this time.  In reviewing the patient's chart, it appears that the refill request for the Breo inhaler was declined by pulmonology.  Informed the patient that we will not be able to refill this inhaler at this time.  The patient is currently taking Advair.  Advised the patient that Advair is also a steroid inhaler with the same active ingredients as Breo.  Instructed the patient to continue his Advair inhaler as prescribed.  We will prescribe the patient a Medrol Dosepak for his acute asthma exacerbation.  Instructed the patient to follow-up with pulmonology.  Advised the patient to monitor for worsening signs or symptoms.  Recommend OTC medications and supportive care for symptomatic management.  Recommend the patient follow-up with pulmonology as above.  Discussed return precautions with the patient, and he verbalized understanding.       Differential diagnoses, supportive care, and indications for immediate follow-up discussed with patient.   Instructed to return to clinic or nearest emergency department for any change in condition, further  concerns, or worsening of symptoms.    OTC Tylenol or Motrin for fever/discomfort.  OTC cough/cold medication for symptomatic relief  OTC Supportive Care for Congestion - saline nasal spray or neti pot  Drink plenty of fluids  Follow-up with PCP  Return to clinic or go to the ED if symptoms worsen or fail to improve, or if the patient should develop worsening/increasing cough, congestion, ear pain, sore throat, shortness of breath, wheezing, chest pain, fever/chills, and/or any concerning symptoms.    Discussed plan with the patient, and he agrees to the above.    I personally reviewed prior external notes and test results pertinent to today's visit.  I have independently reviewed and interpreted all diagnostics ordered during this urgent care visit.     Please note that this dictation was created using voice recognition software. I have made every reasonable attempt to correct obvious errors, but I expect that there may be errors of grammar and possibly content that I did not discover before finalizing the note.     This note was electronically signed by Yun Youssef PA-C

## 2023-04-25 ENCOUNTER — TELEPHONE (OUTPATIENT)
Dept: SLEEP MEDICINE | Facility: MEDICAL CENTER | Age: 68
End: 2023-04-25
Payer: COMMERCIAL

## 2023-04-25 RX ORDER — BUDESONIDE AND FORMOTEROL FUMARATE DIHYDRATE 160; 4.5 UG/1; UG/1
2 AEROSOL RESPIRATORY (INHALATION) 2 TIMES DAILY
Qty: 1 EACH | Refills: 11 | Status: SHIPPED | OUTPATIENT
Start: 2023-04-25 | End: 2024-01-26

## 2023-04-25 NOTE — TELEPHONE ENCOUNTER
Caller Name: patient   Call Back Number: [phone] (home) [phone] (work)    How would the patient prefer to be contacted with a response: Phone call do NOT leave a detailed message    Patient completed CXR 4/24/23. Requesting results, patient confused with [facility] referral. Informed his insurance is not contracted. If decides to see Dr. Mathur an out of network will need to be completed and he will be responsible for charges.

## 2023-04-26 NOTE — TELEPHONE ENCOUNTER
Phone Number Called: [phone] (home) [phone] (work)    Call outcome: Left detailed message for patient. Informed to call back with any additional questions.    Message: informed per Dr. Mathur CXR clear.

## 2023-04-26 NOTE — TELEPHONE ENCOUNTER
Phone Number Called: [phone] (home) [phone] (work)    Call outcome: Left detailed message for patient. Informed to call back with any additional questions.    Message: informed patient RX for Symbicort was sent to replace Breo and Advair since both are not covered under insurance.

## 2023-05-10 ENCOUNTER — APPOINTMENT (OUTPATIENT)
Dept: MEDICAL GROUP | Facility: PHYSICIAN GROUP | Age: 68
End: 2023-05-10
Payer: COMMERCIAL

## 2023-05-12 DIAGNOSIS — J44.9 CHRONIC OBSTRUCTIVE PULMONARY DISEASE, UNSPECIFIED COPD TYPE (HCC): ICD-10-CM

## 2023-05-12 PROCEDURE — 1126F AMNT PAIN NOTED NONE PRSNT: CPT | Performed by: INTERNAL MEDICINE

## 2023-05-12 RX ORDER — ALBUTEROL SULFATE 90 UG/1
2 AEROSOL, METERED RESPIRATORY (INHALATION) EVERY 6 HOURS PRN
Qty: 1 EACH | Refills: 2 | Status: SHIPPED | OUTPATIENT
Start: 2023-05-12

## 2023-05-12 NOTE — TELEPHONE ENCOUNTER
Have we ever prescribed this med? Yes.  If yes, what date? 11/02/20    Last OV: 03/01/23 with Dr Mathur    Next OV: No Pending appt.     DX: COPD    Medications:   Requested Prescriptions     Pending Prescriptions Disp Refills    albuterol 108 (90 Base) MCG/ACT Aero Soln inhalation aerosol 1 Each 2     Sig: Inhale 2 Puffs every 6 hours as needed for Shortness of Breath.

## 2023-05-20 ENCOUNTER — OFFICE VISIT (OUTPATIENT)
Dept: URGENT CARE | Facility: PHYSICIAN GROUP | Age: 68
End: 2023-05-20
Payer: COMMERCIAL

## 2023-05-20 ENCOUNTER — APPOINTMENT (OUTPATIENT)
Dept: CARDIOLOGY | Facility: MEDICAL CENTER | Age: 68
End: 2023-05-20
Payer: COMMERCIAL

## 2023-05-20 ENCOUNTER — APPOINTMENT (OUTPATIENT)
Dept: RADIOLOGY | Facility: MEDICAL CENTER | Age: 68
End: 2023-05-20
Attending: EMERGENCY MEDICINE
Payer: COMMERCIAL

## 2023-05-20 ENCOUNTER — APPOINTMENT (OUTPATIENT)
Dept: RADIOLOGY | Facility: IMAGING CENTER | Age: 68
End: 2023-05-20
Attending: NURSE PRACTITIONER
Payer: COMMERCIAL

## 2023-05-20 ENCOUNTER — HOSPITAL ENCOUNTER (OUTPATIENT)
Facility: MEDICAL CENTER | Age: 68
End: 2023-05-21
Attending: EMERGENCY MEDICINE | Admitting: HOSPITALIST
Payer: COMMERCIAL

## 2023-05-20 VITALS
BODY MASS INDEX: 25.16 KG/M2 | OXYGEN SATURATION: 99 % | RESPIRATION RATE: 16 BRPM | WEIGHT: 166 LBS | SYSTOLIC BLOOD PRESSURE: 126 MMHG | DIASTOLIC BLOOD PRESSURE: 82 MMHG | HEIGHT: 68 IN | HEART RATE: 68 BPM | TEMPERATURE: 97.6 F

## 2023-05-20 DIAGNOSIS — R06.02 SOB (SHORTNESS OF BREATH): ICD-10-CM

## 2023-05-20 DIAGNOSIS — R94.31 ST ELEVATION: ICD-10-CM

## 2023-05-20 DIAGNOSIS — R07.9 CHEST PAIN, UNSPECIFIED TYPE: ICD-10-CM

## 2023-05-20 DIAGNOSIS — I51.7 LVH (LEFT VENTRICULAR HYPERTROPHY): ICD-10-CM

## 2023-05-20 DIAGNOSIS — K21.9 GASTROESOPHAGEAL REFLUX DISEASE WITHOUT ESOPHAGITIS: ICD-10-CM

## 2023-05-20 PROBLEM — E11.9 DIABETES (HCC): Status: ACTIVE | Noted: 2023-05-20

## 2023-05-20 LAB
ALBUMIN SERPL BCP-MCNC: 4 G/DL (ref 3.2–4.9)
ALBUMIN/GLOB SERPL: 1.7 G/DL
ALP SERPL-CCNC: 72 U/L (ref 30–99)
ALT SERPL-CCNC: 14 U/L (ref 2–50)
ANION GAP SERPL CALC-SCNC: 12 MMOL/L (ref 7–16)
AST SERPL-CCNC: 17 U/L (ref 12–45)
BASOPHILS # BLD AUTO: 0.8 % (ref 0–1.8)
BASOPHILS # BLD: 0.04 K/UL (ref 0–0.12)
BILIRUB SERPL-MCNC: 0.3 MG/DL (ref 0.1–1.5)
BUN SERPL-MCNC: 8 MG/DL (ref 8–22)
CALCIUM ALBUM COR SERPL-MCNC: 8.7 MG/DL (ref 8.5–10.5)
CALCIUM SERPL-MCNC: 8.7 MG/DL (ref 8.5–10.5)
CHLORIDE SERPL-SCNC: 107 MMOL/L (ref 96–112)
CO2 SERPL-SCNC: 22 MMOL/L (ref 20–33)
CREAT SERPL-MCNC: 1 MG/DL (ref 0.5–1.4)
EKG IMPRESSION: NORMAL
EOSINOPHIL # BLD AUTO: 0.1 K/UL (ref 0–0.51)
EOSINOPHIL NFR BLD: 2.1 % (ref 0–6.9)
ERYTHROCYTE [DISTWIDTH] IN BLOOD BY AUTOMATED COUNT: 44.2 FL (ref 35.9–50)
GFR SERPLBLD CREATININE-BSD FMLA CKD-EPI: 81 ML/MIN/1.73 M 2
GLOBULIN SER CALC-MCNC: 2.3 G/DL (ref 1.9–3.5)
GLUCOSE BLD STRIP.AUTO-MCNC: 173 MG/DL (ref 65–99)
GLUCOSE SERPL-MCNC: 164 MG/DL (ref 65–99)
HCT VFR BLD AUTO: 46.4 % (ref 42–52)
HGB BLD-MCNC: 15.4 G/DL (ref 14–18)
IMM GRANULOCYTES # BLD AUTO: 0.02 K/UL (ref 0–0.11)
IMM GRANULOCYTES NFR BLD AUTO: 0.4 % (ref 0–0.9)
LYMPHOCYTES # BLD AUTO: 1.55 K/UL (ref 1–4.8)
LYMPHOCYTES NFR BLD: 31.8 % (ref 22–41)
MCH RBC QN AUTO: 30.7 PG (ref 27–33)
MCHC RBC AUTO-ENTMCNC: 33.2 G/DL (ref 33.7–35.3)
MCV RBC AUTO: 92.4 FL (ref 81.4–97.8)
MONOCYTES # BLD AUTO: 0.4 K/UL (ref 0–0.85)
MONOCYTES NFR BLD AUTO: 8.2 % (ref 0–13.4)
NEUTROPHILS # BLD AUTO: 2.76 K/UL (ref 1.82–7.42)
NEUTROPHILS NFR BLD: 56.7 % (ref 44–72)
NRBC # BLD AUTO: 0 K/UL
NRBC BLD-RTO: 0 /100 WBC
PLATELET # BLD AUTO: 173 K/UL (ref 164–446)
PMV BLD AUTO: 10.4 FL (ref 9–12.9)
POTASSIUM SERPL-SCNC: 3.9 MMOL/L (ref 3.6–5.5)
PROT SERPL-MCNC: 6.3 G/DL (ref 6–8.2)
RBC # BLD AUTO: 5.02 M/UL (ref 4.7–6.1)
SODIUM SERPL-SCNC: 141 MMOL/L (ref 135–145)
TROPONIN T SERPL-MCNC: 10 NG/L (ref 6–19)
TROPONIN T SERPL-MCNC: 9 NG/L (ref 6–19)
TROPONIN T SERPL-MCNC: 9 NG/L (ref 6–19)
WBC # BLD AUTO: 4.9 K/UL (ref 4.8–10.8)

## 2023-05-20 PROCEDURE — 700111 HCHG RX REV CODE 636 W/ 250 OVERRIDE (IP)

## 2023-05-20 PROCEDURE — 93005 ELECTROCARDIOGRAM TRACING: CPT | Performed by: EMERGENCY MEDICINE

## 2023-05-20 PROCEDURE — 71046 X-RAY EXAM CHEST 2 VIEWS: CPT | Mod: TC,FY | Performed by: NURSE PRACTITIONER

## 2023-05-20 PROCEDURE — 3079F DIAST BP 80-89 MM HG: CPT | Performed by: NURSE PRACTITIONER

## 2023-05-20 PROCEDURE — 36415 COLL VENOUS BLD VENIPUNCTURE: CPT

## 2023-05-20 PROCEDURE — 71045 X-RAY EXAM CHEST 1 VIEW: CPT

## 2023-05-20 PROCEDURE — 85025 COMPLETE CBC W/AUTO DIFF WBC: CPT

## 2023-05-20 PROCEDURE — G0378 HOSPITAL OBSERVATION PER HR: HCPCS

## 2023-05-20 PROCEDURE — A9270 NON-COVERED ITEM OR SERVICE: HCPCS

## 2023-05-20 PROCEDURE — 700102 HCHG RX REV CODE 250 W/ 637 OVERRIDE(OP)

## 2023-05-20 PROCEDURE — 99215 OFFICE O/P EST HI 40 MIN: CPT | Performed by: NURSE PRACTITIONER

## 2023-05-20 PROCEDURE — 99285 EMERGENCY DEPT VISIT HI MDM: CPT

## 2023-05-20 PROCEDURE — 93306 TTE W/DOPPLER COMPLETE: CPT

## 2023-05-20 PROCEDURE — 96372 THER/PROPH/DIAG INJ SC/IM: CPT | Mod: XU

## 2023-05-20 PROCEDURE — 3074F SYST BP LT 130 MM HG: CPT | Performed by: NURSE PRACTITIONER

## 2023-05-20 PROCEDURE — 82962 GLUCOSE BLOOD TEST: CPT

## 2023-05-20 PROCEDURE — 93000 ELECTROCARDIOGRAM COMPLETE: CPT | Performed by: NURSE PRACTITIONER

## 2023-05-20 PROCEDURE — 93005 ELECTROCARDIOGRAM TRACING: CPT

## 2023-05-20 PROCEDURE — 80053 COMPREHEN METABOLIC PANEL: CPT

## 2023-05-20 PROCEDURE — 71275 CT ANGIOGRAPHY CHEST: CPT

## 2023-05-20 PROCEDURE — 99223 1ST HOSP IP/OBS HIGH 75: CPT | Mod: FS | Performed by: HOSPITALIST

## 2023-05-20 PROCEDURE — 84484 ASSAY OF TROPONIN QUANT: CPT

## 2023-05-20 PROCEDURE — 700117 HCHG RX CONTRAST REV CODE 255: Performed by: EMERGENCY MEDICINE

## 2023-05-20 RX ORDER — BISACODYL 10 MG
10 SUPPOSITORY, RECTAL RECTAL
Status: DISCONTINUED | OUTPATIENT
Start: 2023-05-20 | End: 2023-05-21 | Stop reason: HOSPADM

## 2023-05-20 RX ORDER — AMLODIPINE BESYLATE 5 MG/1
5 TABLET ORAL
Status: DISCONTINUED | OUTPATIENT
Start: 2023-05-21 | End: 2023-05-21 | Stop reason: HOSPADM

## 2023-05-20 RX ORDER — ASPIRIN 300 MG/1
300 SUPPOSITORY RECTAL DAILY
Status: DISCONTINUED | OUTPATIENT
Start: 2023-05-20 | End: 2023-05-21 | Stop reason: HOSPADM

## 2023-05-20 RX ORDER — ROSUVASTATIN CALCIUM 20 MG/1
20 TABLET, COATED ORAL
Status: DISCONTINUED | OUTPATIENT
Start: 2023-05-21 | End: 2023-05-21 | Stop reason: HOSPADM

## 2023-05-20 RX ORDER — HYDRALAZINE HYDROCHLORIDE 20 MG/ML
10 INJECTION INTRAMUSCULAR; INTRAVENOUS EVERY 4 HOURS PRN
Status: DISCONTINUED | OUTPATIENT
Start: 2023-05-20 | End: 2023-05-21 | Stop reason: HOSPADM

## 2023-05-20 RX ORDER — ONDANSETRON 2 MG/ML
4 INJECTION INTRAMUSCULAR; INTRAVENOUS EVERY 4 HOURS PRN
Status: DISCONTINUED | OUTPATIENT
Start: 2023-05-20 | End: 2023-05-21 | Stop reason: HOSPADM

## 2023-05-20 RX ORDER — ACETAMINOPHEN 325 MG/1
650 TABLET ORAL EVERY 6 HOURS PRN
Status: DISCONTINUED | OUTPATIENT
Start: 2023-05-20 | End: 2023-05-21 | Stop reason: HOSPADM

## 2023-05-20 RX ORDER — OMEPRAZOLE 20 MG/1
20 CAPSULE, DELAYED RELEASE ORAL DAILY
Status: DISCONTINUED | OUTPATIENT
Start: 2023-05-21 | End: 2023-05-21 | Stop reason: HOSPADM

## 2023-05-20 RX ORDER — ENOXAPARIN SODIUM 100 MG/ML
40 INJECTION SUBCUTANEOUS DAILY
Status: DISCONTINUED | OUTPATIENT
Start: 2023-05-20 | End: 2023-05-21 | Stop reason: HOSPADM

## 2023-05-20 RX ORDER — ONDANSETRON 4 MG/1
4 TABLET, ORALLY DISINTEGRATING ORAL EVERY 4 HOURS PRN
Status: DISCONTINUED | OUTPATIENT
Start: 2023-05-20 | End: 2023-05-21 | Stop reason: HOSPADM

## 2023-05-20 RX ORDER — ASPIRIN 81 MG/1
324 TABLET, CHEWABLE ORAL DAILY
Status: DISCONTINUED | OUTPATIENT
Start: 2023-05-20 | End: 2023-05-21 | Stop reason: HOSPADM

## 2023-05-20 RX ORDER — BENAZEPRIL HYDROCHLORIDE 20 MG/1
20 TABLET ORAL
Status: DISCONTINUED | OUTPATIENT
Start: 2023-05-21 | End: 2023-05-21 | Stop reason: HOSPADM

## 2023-05-20 RX ORDER — AMLODIPINE BESYLATE AND BENAZEPRIL HYDROCHLORIDE 5; 20 MG/1; MG/1
1 CAPSULE ORAL
Status: DISCONTINUED | OUTPATIENT
Start: 2023-05-20 | End: 2023-05-20

## 2023-05-20 RX ORDER — POLYETHYLENE GLYCOL 3350 17 G/17G
1 POWDER, FOR SOLUTION ORAL
Status: DISCONTINUED | OUTPATIENT
Start: 2023-05-20 | End: 2023-05-21 | Stop reason: HOSPADM

## 2023-05-20 RX ORDER — AMOXICILLIN 250 MG
2 CAPSULE ORAL 2 TIMES DAILY
Status: DISCONTINUED | OUTPATIENT
Start: 2023-05-20 | End: 2023-05-21 | Stop reason: HOSPADM

## 2023-05-20 RX ORDER — ESOMEPRAZOLE MAGNESIUM 40 MG/1
40 CAPSULE, DELAYED RELEASE ORAL
Status: DISCONTINUED | OUTPATIENT
Start: 2023-05-20 | End: 2023-05-20

## 2023-05-20 RX ORDER — ASPIRIN 325 MG
325 TABLET ORAL DAILY
Status: DISCONTINUED | OUTPATIENT
Start: 2023-05-20 | End: 2023-05-21 | Stop reason: HOSPADM

## 2023-05-20 RX ADMIN — IOHEXOL 47 ML: 350 INJECTION, SOLUTION INTRAVENOUS at 16:45

## 2023-05-20 RX ADMIN — ENOXAPARIN SODIUM 40 MG: 100 INJECTION SUBCUTANEOUS at 17:52

## 2023-05-20 RX ADMIN — SENNOSIDES AND DOCUSATE SODIUM 2 TABLET: 50; 8.6 TABLET ORAL at 17:52

## 2023-05-20 RX ADMIN — ASPIRIN 81 MG 324 MG: 81 TABLET ORAL at 17:52

## 2023-05-20 RX ADMIN — INSULIN HUMAN 1 UNITS: 100 INJECTION, SOLUTION PARENTERAL at 20:04

## 2023-05-20 ASSESSMENT — ENCOUNTER SYMPTOMS
COUGH: 0
FEVER: 0
HEARTBURN: 0
DIZZINESS: 0
ABDOMINAL PAIN: 0
PND: 0
NAUSEA: 0
VOMITING: 0
CLAUDICATION: 0
NECK PAIN: 1
CHILLS: 0
SHORTNESS OF BREATH: 1
PALPITATIONS: 0
SORE THROAT: 0
FEVER: 0
DIARRHEA: 0
NAUSEA: 0
COUGH: 0
PALPITATIONS: 1
VOMITING: 0
SHORTNESS OF BREATH: 0
EYE PAIN: 0
ORTHOPNEA: 0
NERVOUS/ANXIOUS: 1
MYALGIAS: 0
HEADACHES: 0
DIZZINESS: 0
CHILLS: 0
HEADACHES: 0

## 2023-05-20 ASSESSMENT — PATIENT HEALTH QUESTIONNAIRE - PHQ9
1. LITTLE INTEREST OR PLEASURE IN DOING THINGS: NOT AT ALL
2. FEELING DOWN, DEPRESSED, IRRITABLE, OR HOPELESS: NOT AT ALL
SUM OF ALL RESPONSES TO PHQ9 QUESTIONS 1 AND 2: 0

## 2023-05-20 ASSESSMENT — FIBROSIS 4 INDEX
FIB4 SCORE: 1.84
FIB4 SCORE: 2.48
FIB4 SCORE: 2.48

## 2023-05-20 ASSESSMENT — LIFESTYLE VARIABLES
HAVE PEOPLE ANNOYED YOU BY CRITICIZING YOUR DRINKING: NO
ON A TYPICAL DAY WHEN YOU DRINK ALCOHOL HOW MANY DRINKS DO YOU HAVE: 1
TOTAL SCORE: 0
HAVE YOU EVER FELT YOU SHOULD CUT DOWN ON YOUR DRINKING: NO
TOTAL SCORE: 0
AVERAGE NUMBER OF DAYS PER WEEK YOU HAVE A DRINK CONTAINING ALCOHOL: 0
CONSUMPTION TOTAL: NEGATIVE
ALCOHOL_USE: YES
EVER HAD A DRINK FIRST THING IN THE MORNING TO STEADY YOUR NERVES TO GET RID OF A HANGOVER: NO
EVER FELT BAD OR GUILTY ABOUT YOUR DRINKING: NO
HOW MANY TIMES IN THE PAST YEAR HAVE YOU HAD 5 OR MORE DRINKS IN A DAY: 0
TOTAL SCORE: 0

## 2023-05-20 ASSESSMENT — PAIN DESCRIPTION - DESCRIPTORS: DESCRIPTORS: PRESSURE

## 2023-05-20 ASSESSMENT — PAIN DESCRIPTION - PAIN TYPE: TYPE: ACUTE PAIN

## 2023-05-20 NOTE — H&P
Hospital Medicine History & Physical Note    Date of Service  5/20/2023    Primary Care Physician  RODRI Ramos.    Consultants  none    Specialist Names: none    Code Status  Full Code    Chief Complaint  Chief Complaint   Patient presents with    Chest Pain     Pt has been having chest pressure 7/10 right upper chest for a couple of days, last nite being the worse, today pt went to  where they found questionable ST elevation on ekg and sent to ER for eval.  Pt also has copd and feels short of  breath.         History of Presenting Illness  Adrián Sheldon is a 68 y.o. male who presented 5/20/2023 with chest pain.  He has a past medical history of diabetes, hyperlipidemia, hypertension, former smoker and previous heart attack- per patient report.  He has been having ongoing shortness of breath over the past week which required using his inhalers but gave him minimal relief.  He has been having right-sided chest pain that is persistent the pain is worse when he lays on his right side.  This pain is accompanied by anxiety but it does not worsen with exertion.  He does have intermittent cough that is nonproductive.    In ED, patient is slightly hypertensive but labs are largely unremarkable.  Blood glucose is elevated to 162.  EKG showing sinus rhythm.  Troponins x2 negative.  CTA negative for PE or pneumonia/pneumothorax.  Patient will be admitted for chest pain rule out with a stress test in the a.m.    I discussed the plan of care with patient.    Review of Systems  Review of Systems   Constitutional:  Negative for chills, fever and malaise/fatigue.   Respiratory:  Positive for shortness of breath. Negative for cough.    Cardiovascular:  Positive for chest pain. Negative for palpitations and leg swelling.   Gastrointestinal:  Negative for abdominal pain, diarrhea, heartburn, nausea and vomiting.   Genitourinary:  Negative for dysuria, frequency and urgency.   Neurological:  Negative for dizziness and  headaches.   Psychiatric/Behavioral:  The patient is nervous/anxious.    All other systems reviewed and are negative.      Past Medical History   has a past medical history of Arthritis, Asthma without status asthmaticus (03/07/2020), Benign prostatic hyperplasia with urinary obstruction (3/3/2020), Bronchitis, Carotid artery stenosis, asymptomatic, right (08/28/2020), Cataract, Chest pain, Chest tightness, Constipation, COPD (chronic obstructive pulmonary disease) ([facility]) (03/07/2020), COPD (chronic obstructive pulmonary disease) ([facility]) (3/7/2020), Diabetes ([facility]) (5/20/2023), Frequent urination, [place] measles, H. pylori infection (2/8/2023), Hearing difficulty, Heartburn, Hypertension, Incomplete emptying of bladder (3/3/2020), Intercostal pain, R side, musculoskeletal chondritis (4/20/2020), Lesion of penis (3/3/2020), Nocturia (3/3/2020), Painful joint, Raised prostate specific antigen (3/3/2020), Shortness of breath, Sweat, sweating, excessive, Vision loss, Wears glasses, and Wheezing.    Surgical History   has a past surgical history that includes laminotomy (06/2022) and prostatectomy, radical retro (12/2022).     Family History  family history includes Heart Attack (age of onset: 30) in his brother; Heart Attack (age of onset: 70) in his father; Heart Disease in his brother and father; No Known Problems in his mother.   Family history reviewed with patient. There is no family history that is pertinent to the chief complaint.     Social History   reports that he quit smoking about 4 years ago. His smoking use included cigarettes. He started smoking about 43 years ago. He has a 40.00 pack-year smoking history. He has been exposed to tobacco smoke. He has never used smokeless tobacco. He reports current alcohol use. He reports that he does not use drugs.    Allergies  No Known Allergies    Medications  Prior to Admission Medications   Prescriptions Last Dose Informant Patient Reported? Taking?   albuterol 108 (90  Base) MCG/ACT Aero Soln inhalation aerosol   No No   Sig: Inhale 2 Puffs every 6 hours as needed for Shortness of Breath.   amlodipine-benazepril (LOTREL) 5-20 MG per capsule   No No   Sig: TAKE ONE CAPSULE BY MOUTH EVERY DAY   aspirin EC (ECOTRIN) 81 MG Tablet Delayed Response   Yes No   Sig: Take 81 mg by mouth every day.   budesonide-formoterol (SYMBICORT) 160-4.5 MCG/ACT Aerosol   No No   Sig: Inhale 2 Puffs 2 times a day. Use with spacer.  Rinse mouth after each use.   esomeprazole (NEXIUM) 40 MG delayed-release capsule   Yes No   Sig: Take 40 mg by mouth every morning before breakfast.   fluticasone (FLONASE) 50 MCG/ACT nasal spray   No No   Sig: Administer 1-2 Sprays into affected nostril(S) every day. Each nostril.   fluticasone furoate-vilanterol (BREO ELLIPTA) 200-25 MCG/ACT AEROSOL POWDER, BREATH ACTIVATED   No No   Sig: Inhale 1 Puff every day. Rinse mouth after use.   fluticasone-salmeterol (ADVAIR HFA) 230-21 MCG/ACT inhaler   No No   Sig: Inhale 2 Puffs 2 times a day. Use with spacer.  Rinse mouth after each use.   methylPREDNISolone (MEDROL DOSEPAK) 4 MG Tablet Therapy Pack   No No   Sig: Follow schedule on package instructions.   montelukast (SINGULAIR) 10 MG Tab   No No   Sig: Take 1 Tablet by mouth every evening.   nystatin (MYCOSTATIN) 024761 UNIT/ML Suspension   Yes No   Sig: nystatin 100,000 unit/mL oral suspension   rosuvastatin (CRESTOR) 20 MG Tab   No No   Sig: Take 1 Tablet by mouth every day.   sucralfate (CARAFATE) 1 GM Tab   Yes No   Sig: sucralfate 1 gram tablet   sucralfate (CARAFATE) 1 GM Tab   Yes No   vitamin D2, Ergocalciferol, (DRISDOL) 1.25 MG (50582 UT) Cap capsule   Yes No   Sig: Take  by mouth every 7 days.      Facility-Administered Medications: None       Physical Exam  Temp:  [36.3 °C (97.3 °F)-37 °C (98.6 °F)] 36.3 °C (97.3 °F)  Pulse:  [54-75] 62  Resp:  [13-19] 18  BP: (123-172)/() 149/100  SpO2:  [92 %-99 %] 93 %  Blood Pressure : (!) 165/69   Temperature: 36.3 °C  (97.3 °F)   Pulse: 62   Respiration: 14   Pulse Oximetry: 95 %       Physical Exam  Vitals and nursing note reviewed.   Constitutional:       Appearance: Normal appearance. He is not ill-appearing.   HENT:      Head: Normocephalic and atraumatic.      Jaw: There is normal jaw occlusion.      Right Ear: Hearing normal.      Left Ear: Hearing normal.      Nose: Nose normal.      Mouth/Throat:      Lips: Pink.      Mouth: Mucous membranes are moist.   Eyes:      Extraocular Movements: Extraocular movements intact.      Conjunctiva/sclera: Conjunctivae normal.      Pupils: Pupils are equal, round, and reactive to light.   Neck:      Vascular: No carotid bruit.   Cardiovascular:      Rate and Rhythm: Normal rate and regular rhythm.      Pulses: Normal pulses.      Heart sounds: Normal heart sounds, S1 normal and S2 normal.   Pulmonary:      Effort: Pulmonary effort is normal.      Breath sounds: Normal breath sounds and air entry. No stridor.   Abdominal:      General: Bowel sounds are normal.      Palpations: Abdomen is soft.      Tenderness: There is no abdominal tenderness.   Musculoskeletal:      Cervical back: Normal range of motion and neck supple.      Right lower leg: No edema.      Left lower leg: No edema.   Skin:     General: Skin is warm and dry.      Capillary Refill: Capillary refill takes less than 2 seconds.   Neurological:      General: No focal deficit present.      Mental Status: He is alert and oriented to person, place, and time. Mental status is at baseline.      Sensory: Sensation is intact.      Motor: Motor function is intact.   Psychiatric:         Attention and Perception: Attention and perception normal.         Mood and Affect: Mood and affect normal.         Speech: Speech normal.         Behavior: Behavior normal. Behavior is cooperative.         Laboratory:  Recent Labs     05/20/23  1319   WBC 4.9   RBC 5.02   HEMOGLOBIN 15.4   HEMATOCRIT 46.4   MCV 92.4   MCH 30.7   MCHC 33.2*   RDW 44.2    PLATELETCT 173   MPV 10.4     Recent Labs     05/20/23  1319   SODIUM 141   POTASSIUM 3.9   CHLORIDE 107   CO2 22   GLUCOSE 164*   BUN 8   CREATININE 1.00   CALCIUM 8.7     Recent Labs     05/20/23  1319   ALTSGPT 14   ASTSGOT 17   ALKPHOSPHAT 72   TBILIRUBIN 0.3   GLUCOSE 164*         No results for input(s): NTPROBNP in the last 72 hours.      Recent Labs     05/20/23  1319 05/20/23  1534   TROPONINT 9 10       Imaging:  CT-CTA CHEST PULMONARY ARTERY W/ RECONS   Final Result      1.  No CT evidence for pulmonary emboli.   2.  No pneumonia or pneumothorax.            DX-CHEST-PORTABLE (1 VIEW)   Final Result      No acute cardiopulmonary disease.      NM-CARDIAC STRESS TEST    (Results Pending)   EC-ECHOCARDIOGRAM COMPLETE W/O CONT    (Results Pending)       X-Ray:  I have personally reviewed the images and compared with prior images.  EKG:  I have personally reviewed the images and compared with prior images.    Assessment/Plan:  Justification for Admission Status  I anticipate this patient is appropriate for observation status at this time because rule out chest pain    Patient will need a Telemetry bed on EMERGENCY service .  The need is secondary to rule out chest pain.    * Chest pain  Assessment & Plan  The ASCVD Risk score (Je DK, et al., 2019) failed to calculate for the following reasons:    The patient has a prior MI or stroke diagnosis   -Patient has been having ongoing chest pain with shortness of breath x 1 week  -Seen at urgent care earlier this morning and found questionable ST evelvation- so was sent here  -EKG (upon arrival to ED) showing sinus rhythm  -Troponin x2 negative  -Stress test in a.m.  -Echo (last echo 2020) pending  -NPO at midnight    Diabetes (HCC)  Assessment & Plan  - Last hemoglobin A1c 6.0-we will recheck  -Blood glucose 162  -Not on any home medications for diabetes  -ISS while in hospital    HTN (hypertension)- (present on admission)  Assessment & Plan  - Continue home  amlodipine/benazepril  -As needed IV hydralazine available    Gastroesophageal reflux disease without esophagitis- (present on admission)  Assessment & Plan  - Continue home Prilosec    Former smoker- (present on admission)  Assessment & Plan  - quit about 6 years ago    Other hyperlipidemia- (present on admission)  Assessment & Plan  - Continue Crestor  -Recheck lipid panel in a.m.        VTE prophylaxis: enoxaparin ppx

## 2023-05-20 NOTE — ED TRIAGE NOTES
"Chief Complaint   Patient presents with    Chest Pain     Pt has been having chest pressure 7/10 right upper chest for a couple of days, last nite being the worse, today pt went to  where they found questionable ST elevation on ekg and sent to ER for eval.  Pt also has copd and feels short of  breath.       /79   Pulse 64   Temp 36.3 °C (97.3 °F) (Temporal)   Resp 16   Ht 1.727 m (5' 8\")   Wt 75.9 kg (167 lb 5.3 oz)   SpO2 97%   BMI 25.44 kg/m²     "

## 2023-05-20 NOTE — PROGRESS NOTES
Subjective:   Adrián Sheldon is a 68 y.o. male who presents for Cough (Hard to breathe x1wk)      HPI  Patient is a 68-year-old male presents urgent care for evaluation of ongoing shortness of breath over the past week which he attributes to his asthma.  Has been using his inhalers with minimal relief.  Patient states that he has been having pain in the right side of his chest that has been persistent.  Pain is worse when he lays on his right side.  At these times he does feel anxious in which when he lays flat he has a rapid heartbeat.  Does not worsen with exertion.  He does have an intermittent cough that is nonproductive.  Patient does have a scheduled MRI in a couple weeks of his chest to rule out cancer.  He has had a history of heart attack.  He does have a history of tobacco use.  Denies any fever or chills.  Denies sick contacts.  Review of Systems   Constitutional:  Negative for chills and fever.   HENT:  Negative for sore throat.    Eyes:  Negative for pain.   Respiratory:  Negative for cough and shortness of breath.    Cardiovascular:  Positive for chest pain and palpitations. Negative for orthopnea, claudication, leg swelling and PND.   Gastrointestinal:  Negative for nausea and vomiting.   Genitourinary:  Negative for hematuria.   Musculoskeletal:  Positive for neck pain. Negative for myalgias.   Skin:  Negative for rash.   Neurological:  Negative for dizziness and headaches.       Medications:    Advair HFA Aero  albuterol Aers  amlodipine-benazepril  aspirin EC Tbec  budesonide-formoterol Aero  esomeprazole  fluticasone  fluticasone furoate-vilanterol Aepb  methylPREDNISolone Tbpk  montelukast Tabs  nystatin Susp  rosuvastatin Tabs  sucralfate Tabs  vitamin D2 (Ergocalciferol) Caps    Allergies: Patient has no known allergies.    Problem List: Adrián Sheldon does not have any pertinent problems on file.    Surgical History:  Past Surgical History:   Procedure Laterality Date    PROSTATECTOMY,  "RADICAL RETRO  12/2022    LAMINOTOMY  06/2022    L3-L4       Past Social Hx: Adrián Sheldon  reports that he quit smoking about 4 years ago. His smoking use included cigarettes. He started smoking about 43 years ago. He has a 40.00 pack-year smoking history. He has been exposed to tobacco smoke. He has never used smokeless tobacco. He reports current alcohol use. He reports that he does not use drugs.     Past Family Hx:  Adrián Sheldon family history includes Heart Attack (age of onset: 30) in his brother; Heart Attack (age of onset: 70) in his father; Heart Disease in his brother and father; No Known Problems in his mother.     Problem list, medications, and allergies reviewed by myself today in Epic.     Objective:     /82   Pulse 68   Temp 36.4 °C (97.6 °F) (Temporal)   Resp 16   Ht 1.727 m (5' 8\")   Wt 75.3 kg (166 lb)   SpO2 99%   BMI 25.24 kg/m²     Physical Exam  Vitals and nursing note reviewed.   Constitutional:       General: He is not in acute distress.     Appearance: He is well-developed.   HENT:      Head: Normocephalic and atraumatic.      Right Ear: Tympanic membrane and external ear normal.      Left Ear: Tympanic membrane and external ear normal.      Nose: Nose normal.      Right Sinus: No maxillary sinus tenderness or frontal sinus tenderness.      Left Sinus: No maxillary sinus tenderness or frontal sinus tenderness.      Mouth/Throat:      Mouth: Mucous membranes are moist.      Pharynx: Uvula midline. No posterior oropharyngeal erythema.      Tonsils: No tonsillar exudate or tonsillar abscesses.   Eyes:      General:         Right eye: No discharge.         Left eye: No discharge.      Conjunctiva/sclera: Conjunctivae normal.   Cardiovascular:      Rate and Rhythm: Normal rate.   Pulmonary:      Effort: Pulmonary effort is normal. No respiratory distress.      Breath sounds: Normal breath sounds.   Chest:      Chest wall: No swelling, tenderness or crepitus.   Abdominal:      " General: There is no distension.   Musculoskeletal:         General: Normal range of motion.   Skin:     General: Skin is warm and dry.      Findings: No rash.   Neurological:      General: No focal deficit present.      Mental Status: He is alert and oriented to person, place, and time. Mental status is at baseline.      Gait: Gait (gait at baseline) normal.   Psychiatric:         Judgment: Judgment normal.         Assessment/Plan:     Diagnosis and associated orders:     1. SOB (shortness of breath)  DX-CHEST-2 VIEWS    EKG - Clinic Performed      2. Chest pain, unspecified type  DX-CHEST-2 VIEWS    EKG - Clinic Performed      3. LVH (left ventricular hypertrophy)        4. ST elevation             Comments/MDM:     EKG: NSR rate:  60 , normal axis, normal intervals, anterior st elevation LVH present      I independently reviewed the patient's imaging and agree with the interpretation of the radiologist.    .No active disease.      At this time, I feel the patient requires a higher level of care including closer monitoring, stat lab work and/or imaging for further evaluation for complaints of. Chest pain..This has been discussed with the patient and they state agreement and understanding.  I offered the patient an ambulance ride and  the patient is declining at this time. The patient is in no acute distress upon clinic departure and will go directly to ED without delay.                Please note that this dictation was created using voice recognition software. I have made a reasonable attempt to correct obvious errors, but I expect that there are errors of grammar and possibly content that I did not discover before finalizing the note.    This note was electronically signed by Maury ROSE.

## 2023-05-20 NOTE — ED NOTES
Pt ambulated with steady gait from lobby to [facility] [address]. Pt ambulated to bathroom, changed into gown and attached to cardiac monitoring.

## 2023-05-21 ENCOUNTER — APPOINTMENT (OUTPATIENT)
Dept: RADIOLOGY | Facility: MEDICAL CENTER | Age: 68
End: 2023-05-21
Payer: COMMERCIAL

## 2023-05-21 VITALS
RESPIRATION RATE: 18 BRPM | WEIGHT: 166.58 LBS | BODY MASS INDEX: 25.25 KG/M2 | SYSTOLIC BLOOD PRESSURE: 156 MMHG | OXYGEN SATURATION: 92 % | TEMPERATURE: 97.5 F | DIASTOLIC BLOOD PRESSURE: 70 MMHG | HEIGHT: 68 IN | HEART RATE: 61 BPM

## 2023-05-21 LAB
ANION GAP SERPL CALC-SCNC: 9 MMOL/L (ref 7–16)
BUN SERPL-MCNC: 11 MG/DL (ref 8–22)
CALCIUM SERPL-MCNC: 8.6 MG/DL (ref 8.5–10.5)
CHLORIDE SERPL-SCNC: 107 MMOL/L (ref 96–112)
CHOLEST SERPL-MCNC: 101 MG/DL (ref 100–199)
CO2 SERPL-SCNC: 26 MMOL/L (ref 20–33)
CREAT SERPL-MCNC: 1.04 MG/DL (ref 0.5–1.4)
ERYTHROCYTE [DISTWIDTH] IN BLOOD BY AUTOMATED COUNT: 44.8 FL (ref 35.9–50)
EST. AVERAGE GLUCOSE BLD GHB EST-MCNC: 131 MG/DL
GFR SERPLBLD CREATININE-BSD FMLA CKD-EPI: 77 ML/MIN/1.73 M 2
GLUCOSE BLD STRIP.AUTO-MCNC: 112 MG/DL (ref 65–99)
GLUCOSE BLD STRIP.AUTO-MCNC: 130 MG/DL (ref 65–99)
GLUCOSE SERPL-MCNC: 138 MG/DL (ref 65–99)
HBA1C MFR BLD: 6.2 % (ref 4–5.6)
HCT VFR BLD AUTO: 46.5 % (ref 42–52)
HDLC SERPL-MCNC: 39 MG/DL
HGB BLD-MCNC: 15.3 G/DL (ref 14–18)
LDLC SERPL CALC-MCNC: 40 MG/DL
LV EJECT FRACT  99904: 60
LV EJECT FRACT MOD 2C 99903: 73.3
LV EJECT FRACT MOD 4C 99902: 73.65
LV EJECT FRACT MOD BP 99901: 71.72
MCH RBC QN AUTO: 30.5 PG (ref 27–33)
MCHC RBC AUTO-ENTMCNC: 32.9 G/DL (ref 33.7–35.3)
MCV RBC AUTO: 92.6 FL (ref 81.4–97.8)
PLATELET # BLD AUTO: 153 K/UL (ref 164–446)
PMV BLD AUTO: 10.6 FL (ref 9–12.9)
POTASSIUM SERPL-SCNC: 4.4 MMOL/L (ref 3.6–5.5)
RBC # BLD AUTO: 5.02 M/UL (ref 4.7–6.1)
SODIUM SERPL-SCNC: 142 MMOL/L (ref 135–145)
TRIGL SERPL-MCNC: 111 MG/DL (ref 0–149)
WBC # BLD AUTO: 4.3 K/UL (ref 4.8–10.8)

## 2023-05-21 PROCEDURE — 99239 HOSP IP/OBS DSCHRG MGMT >30: CPT | Performed by: HOSPITALIST

## 2023-05-21 PROCEDURE — 80061 LIPID PANEL: CPT

## 2023-05-21 PROCEDURE — 85027 COMPLETE CBC AUTOMATED: CPT

## 2023-05-21 PROCEDURE — 700102 HCHG RX REV CODE 250 W/ 637 OVERRIDE(OP)

## 2023-05-21 PROCEDURE — A9270 NON-COVERED ITEM OR SERVICE: HCPCS

## 2023-05-21 PROCEDURE — 78452 HT MUSCLE IMAGE SPECT MULT: CPT

## 2023-05-21 PROCEDURE — 82962 GLUCOSE BLOOD TEST: CPT

## 2023-05-21 PROCEDURE — 80048 BASIC METABOLIC PNL TOTAL CA: CPT

## 2023-05-21 PROCEDURE — 93306 TTE W/DOPPLER COMPLETE: CPT | Mod: 26 | Performed by: INTERNAL MEDICINE

## 2023-05-21 PROCEDURE — 700111 HCHG RX REV CODE 636 W/ 250 OVERRIDE (IP)

## 2023-05-21 PROCEDURE — 36415 COLL VENOUS BLD VENIPUNCTURE: CPT

## 2023-05-21 PROCEDURE — 83036 HEMOGLOBIN GLYCOSYLATED A1C: CPT

## 2023-05-21 PROCEDURE — G0378 HOSPITAL OBSERVATION PER HR: HCPCS

## 2023-05-21 RX ORDER — REGADENOSON 0.08 MG/ML
INJECTION, SOLUTION INTRAVENOUS
Status: COMPLETED
Start: 2023-05-21 | End: 2023-05-21

## 2023-05-21 RX ORDER — AMINOPHYLLINE 25 MG/ML
100 INJECTION, SOLUTION INTRAVENOUS
Status: DISCONTINUED | OUTPATIENT
Start: 2023-05-21 | End: 2023-05-21 | Stop reason: HOSPADM

## 2023-05-21 RX ORDER — REGADENOSON 0.08 MG/ML
0.4 INJECTION, SOLUTION INTRAVENOUS ONCE
Status: COMPLETED | OUTPATIENT
Start: 2023-05-21 | End: 2023-05-21

## 2023-05-21 RX ADMIN — AMLODIPINE BESYLATE 5 MG: 5 TABLET ORAL at 05:40

## 2023-05-21 RX ADMIN — OMEPRAZOLE 20 MG: 20 CAPSULE, DELAYED RELEASE ORAL at 05:41

## 2023-05-21 RX ADMIN — BENAZEPRIL HYDROCHLORIDE 20 MG: 20 TABLET ORAL at 05:41

## 2023-05-21 RX ADMIN — REGADENOSON 0.4 MG: 0.08 INJECTION, SOLUTION INTRAVENOUS at 14:55

## 2023-05-21 RX ADMIN — ROSUVASTATIN CALCIUM 20 MG: 20 TABLET, FILM COATED ORAL at 05:40

## 2023-05-21 RX ADMIN — ASPIRIN 325 MG: 325 TABLET ORAL at 05:40

## 2023-05-21 RX ADMIN — ACETAMINOPHEN 650 MG: 325 TABLET, FILM COATED ORAL at 00:07

## 2023-05-21 ASSESSMENT — PAIN DESCRIPTION - PAIN TYPE
TYPE: ACUTE PAIN

## 2023-05-21 NOTE — DISCHARGE INSTRUCTIONS
-follow up with pcp  - diet modifications- diabetic diet      Chest Pain Observation  It is often hard to give a specific diagnosis for the cause of chest pain. Among other possibilities your symptoms might be caused by inadequate oxygen delivery to your heart (angina). Angina that is not treated or evaluated can lead to a heart attack (myocardial infarction) or death.  Blood tests, electrocardiograms, and X-rays may have been done to help determine a possible cause of your chest pain. After evaluation and observation, your health care provider has determined that it is unlikely your pain was caused by an unstable condition that requires hospitalization. However, a full evaluation of your pain may need to be completed, with additional diagnostic testing as directed. It is very important to keep your follow-up appointments. Not keeping your follow-up appointments could result in permanent heart damage, disability, or death. If there is any problem keeping your follow-up appointments, you must call your health care provider.  HOME CARE INSTRUCTIONS   Due to the slight chance that your pain could be angina, it is important to follow your health care provider's treatment plan and also maintain a healthy lifestyle:  Maintain or work toward achieving a healthy weight.  Stay physically active and exercise regularly.  Decrease your salt intake.  Eat a balanced, healthy diet. Talk to a dietitian to learn about heart-healthy foods.  Increase your fiber intake by including whole grains, vegetables, fruits, and nuts in your diet.  Avoid situations that cause stress, anger, or depression.  Take medicines as advised by your health care provider. Report any side effects to your health care provider. Do not stop medicines or adjust the dosages on your own.  Quit smoking. Do not use nicotine patches or gum until you check with your health care provider.  Keep your blood pressure, blood sugar, and cholesterol levels within normal  limits.  Limit alcohol intake to no more than 1 drink per day for women who are not pregnant and 2 drinks per day for men.  Do not abuse drugs.  SEEK IMMEDIATE MEDICAL CARE IF:  You have severe chest pain or pressure which may include symptoms such as:  You feel pain or pressure in your arms, neck, jaw, or back.  You have severe back or abdominal pain, feel sick to your stomach (nauseous), or throw up (vomit).  You are sweating profusely.  You are having a fast or irregular heartbeat.  You feel short of breath while at rest.  You notice increasing shortness of breath during rest, sleep, or with activity.  You have chest pain that does not get better after rest or after taking your usual medicine.  You wake from sleep with chest pain.  You are unable to sleep because you cannot breathe.  You develop a frequent cough or you are coughing up blood.  You feel dizzy, faint, or experience extreme fatigue.  You develop severe weakness, dizziness, fainting, or chills.  Any of these symptoms may represent a serious problem that is an emergency. Do not wait to see if the symptoms will go away. Call your local emergency services (911 in the [place] Do not drive yourself to the hospital.  MAKE SURE YOU:  Understand these instructions.  Will watch your condition.  Will get help right away if you are not doing well or get worse.     This information is not intended to replace advice given to you by your health care provider. Make sure you discuss any questions you have with your health care provider.     Document Released: 01/20/2012 Document Revised: 12/23/2014 Document Reviewed: 06/19/2014  [employer] Interactive Patient Education ©2016 [employer]

## 2023-05-21 NOTE — ASSESSMENT & PLAN NOTE
The ASCVD Risk score (Je REYES, et al., 2019) failed to calculate for the following reasons:    The patient has a prior MI or stroke diagnosis   -Patient has been having ongoing chest pain with shortness of breath x 1 week  -Seen at urgent care earlier this morning and found questionable ST evelvation- so was sent here  -EKG (upon arrival to ED) showing sinus rhythm  -Troponin x2 negative  -Stress test in a.m.  -Echo (last echo 2020) pending  -NPO at midnight

## 2023-05-21 NOTE — PROGRESS NOTES
Report received from ER RN. Assume care. Pt. AAOx4 pt is bed,  Assessment completed. VSS. Denies pain but burning sensation to upper Rt chest. Fully ambulatory with steady, good CMS and pulses to dennis LE, denies numbness and tingling to all extremities,  Pt was update for the care for the day. White board updated, All question answered. Pt has call light within reach,  bed is in the lowest position. Pt has no other needs at this time.

## 2023-05-21 NOTE — RESPIRATORY CARE
COPD EDUCATION by COPD CLINICAL EDUCATOR  5/21/2023 at 6:50 AM by Alicia Pickens, RRT     Patient reviewed by COPD education team. Patient does not have a history or diagnosis of COPD and is a non-smoker.  Therefore, patient does not qualify for the COPD program. Of note -he has a recent PFT 2/24/2022 that confirms Moderate Persistent Asthma no obstruction. FEV1=81, FEV1/FVC Ratio 80 with significant bronchodilator response.

## 2023-05-21 NOTE — ED NOTES
Med rec updated and complete. Allergies reviewed. Confirmed name and date of birth.   Med rec completed per interview and medications at bedside.  No antibiotic use in last 30 days.      Home pharmacy   [facility] [phone]  [facility] [phone].

## 2023-05-21 NOTE — ASSESSMENT & PLAN NOTE
- Last hemoglobin A1c 6.0-we will recheck  -Blood glucose 162  -Not on any home medications for diabetes  -ISS while in hospital

## 2023-05-21 NOTE — ED NOTES
Pt transported with RN via gurney to T209. Pt attached to cardiac monitoring with belongings in hand. Floor RN notified of transfer.

## 2023-05-21 NOTE — PROGRESS NOTES
4 Eyes Skin Assessment Completed by PERRY Lopez and PERRY Martinez.    Head WDL  Ears WDL  Nose WDL  Mouth WDL  Neck WDL  Breast/Chest WDL  Shoulder Blades WDL  Spine WDL  (R) Arm/Elbow/Hand WDL  (L) Arm/Elbow/Hand WDL  Abdomen WDL  Groin WDL  Scrotum/Coccyx/Buttocks WDL  (R) Leg WDL  (L) Leg WDL  (R) Heel/Foot/Toe WDL  (L) Heel/Foot/Toe WDL          Devices In Places Tele Box and Pulse Ox      Interventions In Place Pillows    Possible Skin Injury No    Pictures Uploaded Into Epic N/A  Wound Consult Placed N/A  RN Wound Prevention Protocol Ordered No

## 2023-05-21 NOTE — ED PROVIDER NOTES
ED Provider Note    CHIEF COMPLAINT  Chief Complaint   Patient presents with    Chest Pain     Pt has been having chest pressure 7/10 right upper chest for a couple of days, last nite being the worse, today pt went to  where they found questionable ST elevation on ekg and sent to ER for eval.  Pt also has copd and feels short of  breath.           HPI/AGUSTINA    Adrián Sheldon is a 68 y.o. male who presents to the emergency department complaining of chest pain.  The patient states that for the last couple of days he has had intermittent episodes of chest pain, he describes this as a burning pressure-like sensation most frequently he feels it in the right upper chest but he also feels it on the left side sometimes and occasionally the pain radiates to his back and he experiences tightness in the mid chest.  The patient does not recognize any other specific exacerbating or alleviating factors.  The patient said he had a myocardial infarction 5 years ago but at that time his symptoms were different he says that his entire body was shaking.  The patient does not recall whether or not he had cardiac catheterization he says that he was given medications at that time.  He also has a history of diabetes hypertension and high cholesterol and acid reflux.  Finally when the pain is bad the patient states that he feels short of breath.  He was at an urgent care earlier today and was sent to the ER for further evaluation and treatment.  His discomfort is almost completely resolved at the time of our visit in the ER.    Review of systems: No hemoptysis no fever no abdominal pain.    Chart review: I reviewed a neurology consultation note from November 23, 2018 indicating that the patient had an MRI showing findings of stroke.  I cannot find anything in the medical record from this hospital that indicates myocardial infarction 5 years ago as mentioned by the patient    PAST MEDICAL HISTORY   has a past medical history of Arthritis,  Asthma without status asthmaticus (2020), Benign prostatic hyperplasia with urinary obstruction (3/3/2020), Bronchitis, Carotid artery stenosis, asymptomatic, right (2020), Cataract, Chest pain, Chest tightness, Constipation, COPD (chronic obstructive pulmonary disease) ([facility]) (2020), COPD (chronic obstructive pulmonary disease) ([facility]) (3/7/2020), Diabetes ([facility]) (2023), Frequent urination, [place] measles, H. pylori infection (2023), Hearing difficulty, Heartburn, Hypertension, Incomplete emptying of bladder (3/3/2020), Intercostal pain, R side, musculoskeletal chondritis (2020), Lesion of penis (3/3/2020), Nocturia (3/3/2020), Painful joint, Raised prostate specific antigen (3/3/2020), Shortness of breath, Sweat, sweating, excessive, Vision loss, Wears glasses, and Wheezing.    SURGICAL HISTORY   has a past surgical history that includes laminotomy (2022) and prostatectomy, radical retro (2022).    FAMILY HISTORY  Family History   Problem Relation Age of Onset    No Known Problems Mother     Heart Attack Father 70    Heart Disease Father     Heart Attack Brother 30    Heart Disease Brother        SOCIAL HISTORY  Social History     Tobacco Use    Smoking status: Former     Packs/day: 1.00     Years: 40.00     Pack years: 40.00     Types: Cigarettes     Start date: 1980     Quit date: 2019     Years since quittin.2     Passive exposure: Past    Smokeless tobacco: Never   Vaping Use    Vaping Use: Never used   Substance and Sexual Activity    Alcohol use: Yes     Comment: moderately     Drug use: No    Sexual activity: Not Currently     Partners: Female       CURRENT MEDICATIONS  Home Medications       Reviewed by Anita Joaquin (Pharmacy Tech) on 23 at 1706  Med List Status: Complete     Medication Last Dose Status   albuterol 108 (90 Base) MCG/ACT Aero Soln inhalation aerosol 2023 Active   amlodipine-benazepril (LOTREL) 5-20 MG per capsule 2023  "Active   aspirin EC (ECOTRIN) 81 MG Tablet Delayed Response 5/20/2023 Active   budesonide-formoterol (SYMBICORT) 160-4.5 MCG/ACT Aerosol 5/20/2023 Active   esomeprazole (NEXIUM) 40 MG delayed-release capsule 5/20/2023 Active   rosuvastatin (CRESTOR) 20 MG Tab 5/20/2023 Active                    ALLERGIES  No Known Allergies    PHYSICAL EXAM  VITAL SIGNS: BP (!) 149/100   Pulse 62   Temp 36.3 °C (97.3 °F) (Temporal)   Resp 18   Ht 1.727 m (5' 8\")   Wt 75.9 kg (167 lb 5.3 oz)   SpO2 93%   BMI 25.44 kg/m²    Constitutional: The patient is awake lucid and verbal he does not appear toxic or distressed  Eyes: No erythema discharge or jaundice  Neck: Trachea midline no JVD  Cardiovascular: Regular rate and rhythm  Respiratory: Clear bilaterally with no apparent difficulty breathing  Abdomen: Soft and nontender no rebound guarding or peritoneal findings  Skin: Warm and dry  Musculoskeletal: No leg edema or calf tenderness no acute bony deformity  Neurologic: Awake lucid verbal moving all extremities        DIAGNOSTIC STUDIES / PROCEDURES  EKG  I have independently interpreted this EKG  Twelve-lead EKG shows sinus rhythm 65 bpm large voltages throughout the precordial leads suggest LVH there is T wave inversion in lead III there is no pathologic ST elevation there is slight ST depression in V4 V5 and V6 findings are very similar to an EKG from May 25, 2022    LABS  CBC shows white blood cell count of 4.9 hemoglobin is adequate at 15.4 complete metabolic panel is unremarkable except for glucose of 164 troponins are normal at 9 and repeat at 10.    RADIOLOGY  Radiologist interpretation:   CT-CTA CHEST PULMONARY ARTERY W/ RECONS   Final Result      1.  No CT evidence for pulmonary emboli.   2.  No pneumonia or pneumothorax.            DX-CHEST-PORTABLE (1 VIEW)   Final Result      No acute cardiopulmonary disease.      NM-CARDIAC STRESS TEST    (Results Pending)         COURSE & MEDICAL DECISION MAKING  In the emergency " department an IV is established the patient was placed on the cardiac monitor and he remains hemodynamically stable and comfortable.  I reviewed all the findings with the patient and I have reviewed the case with the hospitalist and the patient is referred to the hospitalist service for further evaluation and treatment and will be going to the CDU.  Patient's heart score is 5 giving him a moderate risk for acute coronary syndrome      FINAL DIAGNOSIS  1.  Chest pain  2.  Hypertension       Electronically signed by: Patric Joyner M.D., 5/20/2023 5:28 PM

## 2023-05-21 NOTE — PROGRESS NOTES
Pt converted to afib flutter for 30sec and back to SR . Pt asymptomatic. Informed APRN Kim Gavin. No new orders received

## 2023-05-22 NOTE — DISCHARGE SUMMARY
Discharge Summary    CHIEF COMPLAINT ON ADMISSION  Chief Complaint   Patient presents with    Chest Pain     Pt has been having chest pressure 7/10 right upper chest for a couple of days, last nite being the worse, today pt went to  where they found questionable ST elevation on ekg and sent to ER for eval.  Pt also has copd and feels short of  breath.         Reason for Admission  Sent by      Admission Date  5/20/2023    CODE STATUS  Full Code    HPI & HOSPITAL COURSE  this is an 68 year-old male with a past med history of COPD, GERD, hypertension, diabetes mellitus type 2 who presents with complaints of chest pain and shortness of breath     Chest pain has been going on on and off for last couple days.  The right-sided chest pain is sharp pain intensity 7 out of 10.  He initially thought it was his asthma that was the issue and he was taking his inhaler.  The inhaler did not improve his symptoms.  Initially presented to an urgent care who then referred him to the emergency department at [facility]     No nausea ,vomiting ,fever or  chills     Patient's initial troponin negative however his blood pressure in emergency department was persistently elevated    CT of the chest does not show any evidence of pneumothorax, pulm emboli or pneumonia    During his hospital stay we monitored and controlled his blood pressure    Patient had serial troponins that were negative.  He underwent a Persantine thallium stress that showed no evidence of reversible ischemia.  On the day of discharge patient was chest pain-free and stable for discharge home with the recommendation of the  Diabetic diet as his hemoglobin A1c was 6.2 and to take the medication prescribed below    Patient will follow-up with PCP as soon as possible       Therefore, he is discharged in good and stable condition to home with close outpatient follow-up.    The patient recovered much more quickly than anticipated on  admission.    Discharge Date  5/21/2023    FOLLOW UP ITEMS POST DISCHARGE      DISCHARGE DIAGNOSES  Principal Problem:    Chest pain (POA: Unknown)  Active Problems:    HTN (hypertension) (POA: Yes)    Other hyperlipidemia (POA: Yes)    Former smoker (POA: Yes)    Gastroesophageal reflux disease without esophagitis (POA: Yes)    Diabetes (HCC) (POA: Unknown)  Resolved Problems:    * No resolved hospital problems. *      FOLLOW UP  Future Appointments   Date Time Provider Department Center   6/8/2023  2:00 PM ETHAN Ramos [facility]   9/13/2023  2:20 PM VASCULAR NURSE PRACTITIONER [facility] None     ETHAN Ramos  [address] [id number] [phone]    Follow up        MEDICATIONS ON DISCHARGE     Medication List        CONTINUE taking these medications        Instructions   albuterol 108 (90 Base) MCG/ACT Aers inhalation aerosol   Inhale 2 Puffs every 6 hours as needed for Shortness of Breath.  Dose: 2 Puff     amlodipine-benazepril 5-20 MG per capsule  Commonly known as: LOTREL   TAKE ONE CAPSULE BY MOUTH EVERY DAY     aspirin EC 81 MG Tbec  Commonly known as: ECOTRIN   Take 81 mg by mouth every day.  Dose: 81 mg     budesonide-formoterol 160-4.5 MCG/ACT Aero  Commonly known as: Symbicort   Inhale 2 Puffs 2 times a day. Use with spacer.  Rinse mouth after each use.  Dose: 2 Puff     esomeprazole 40 MG delayed-release capsule  Commonly known as: NEXIUM   Take 40 mg by mouth every morning before breakfast.  Dose: 40 mg     NON SPECIFIED   Take 1 Capsule by mouth every day. * ADK10 SUPPLEMENT *  Dose: 1 Capsule     rosuvastatin 20 MG Tabs  Commonly known as: CRESTOR   Doctor's comments: Needs appt for future refills  Take 1 Tablet by mouth every day.  Dose: 20 mg              Allergies  No Known Allergies    DIET  Orders Placed This Encounter   Procedures    Diet NPO Restrict to: Sips with Medications     Standing Status:   Standing     Number of Occurrences:   8      Order Specific Question:   Diet NPO Restrict to:     Answer:   Sips with Medications [3]       ACTIVITY  As tolerated.  Weight bearing as tolerated    CONSULTATIONS      PROCEDURES  EC-ECHOCARDIOGRAM COMPLETE W/O CONT  Order: [id number]  Status: Final result     Visible to patient: No (inaccessible in MyChart)     Next appt: 2023 at 02:00 PM in [facility] (Sherry Trejo, A.P.R.N.)     0 Result Notes  Details    Reading Physician Reading Date Result Priority   No Reading Provider Prelim 2023    Estrada Connelly M.D.  [phone] 2023      Narrative & Impression  Transthoracic  Echo Report        Echocardiography Laboratory     CONCLUSIONS  Normal left ventricular size, thickness, and systolic function.  The left ventricular ejection fraction is visually estimated to be 60%.     REJI MAYA  Exam Date:         2023                      19:00  Exam Location:     Inpatient  Priority:          Routine     Ordering Physician:        BREANNA REYNOLDS  Referring Physician:       [id number] SAMANTHA  Sonographer:               Hallie Alvarado                              [facility]     Age:    68     Gender:    M  MRN:    [id number]  :    1955  BSA:    1.89   Ht (in):    68     Wt (lb):    166  Exam Type:     Complete     Indications:     Chest Pain  ICD Codes:       786.5     CPT Codes:       26565     BP:   149    /   100    HR:   67  Technical Quality:     MEASUREMENTS  (Male / Female) Normal Values  2D ECHO  Estimated LV Ejection Fraction    60 %                    LV Ejection Fraction MOD BP       71.7 %                >= 55  %  LV Ejection Fraction MOD 4C       73.7 %                  LV Ejection Fraction MOD 2C       73.3 %                  LV Ejection Fraction 4C AL        76.9 %                  LV Ejection Fraction 2C AL        75.8 %                  LA Volume Index                   33.4 cm3/m2           16 - 28 cm3/m2     DOPPLER  AV Peak  Velocity                  1.9 m/s                 AV Peak Gradient                  13.8 mmHg               AV Mean Gradient                  7 mmHg                  LVOT Peak Velocity                1.1 m/s                 Mitral E Point Velocity           1.1 m/s                 Mitral E to A Ratio               1.4                     MV Pressure Half Time             54 ms                   MV Area PHT                       4.1 cm2                 MV Deceleration Time              185 ms                  LV E' Lateral Velocity            12.4 cm/s               Mitral E to LV E' Lateral Ratio   9.2                     LV E' Septal Velocity             12.4 cm/s               Mitral E to LV E' Septal Ratio    9.2                        * Indicates values subject to auto-interpretation  LV EF:  60    %     FINDINGS  Left Ventricle  Normal left ventricular size, thickness, and systolic function. The   left ventricular ejection fraction is visually estimated to be 60%.   Normal regional wall motion. Normal diastolic function.     Right Ventricle  Normal right ventricular size and systolic function.     Right Atrium  Normal right atrial size. Normal inferior vena cava size and   inspiratory collapse.     Left Atrium  Normal left atrial size. Left atrial volume index is 31.9 mL/sq m.     Mitral Valve  Structurally normal mitral valve without significant stenosis or   regurgitation.     Aortic Valve  Structurally normal aortic valve without significant stenosis. Trace   aortic insufficiency.     Tricuspid Valve  Structurally normal tricuspid valve without significant stenosis. No   tricuspid regurgitation. Right atrial pressure is estimated to be 3   mmHg.     Pulmonic Valve  Structurally normal pulmonic valve without significant stenosis or   regurgitation.     Pericardium  No pericardial effusion. Left pleural effusion present.     Aorta  Normal aortic root for body surface area. The ascending aorta diameter   is 3.5  cm.                                Estrada Connelly MD   ===============================================    Order: [id number]  Status: Final result     Visible to patient: No (inaccessible in MyChart)     Next appt: 2023 at 02:00 PM in [facility] (Sherry Trejo, A.P.R.N.)     0 Result Notes  Details    Reading Physician Reading Date Result Priority   No Reading Provider Prelim 2023    Oseas Malave M.D.  [phone] 2023      Narrative & Impression                           Myocardial Perfusion   Report   NUCLEAR IMAGING INTERPRETATION   No myocardial infarct or reversible ischemia.   Normal LEFT ventricular function.      REJI MAYA      MRN:    [id number]         Gender:    M      Exam Date: 2023 14:10      Exam Location:      Inpatient      Ordering Phys:     BREANNA REYNOLDS      NucMed Tech:       Michelle Hoskins      Age:    68    :    1955        Ht (in):     68      Wt (lb):     166    BMI:    25.16       Radiologist      Risk Factors:             Family history of coronary disease, Smoking,                              Hypertension, Diabetes, Asthma, COPD      Indications:              Angina pectoris, unspecified      ICD Codes:                I20.9      Cardiac History:          Positive risk factors, Palpitations, Dyspnea,   Previous                              MI      Cardiac Meds:      Meds Past 24 hrs:      Pretest Chest Pain:       No symptoms      STRESS TEST      Pharmacologi                    c   Protoc   Lexiscan       Dose: 0.4 mg   ol:                                 Post-Injection Exercise:        No exercise followed the intravenous   injection                     Resting HR (bpm):      51      Peak HR (bpm):         69      Resting BP (mmHg):       149    /   55      Peak BP (mmHg):       163   /   58      MaxPHR:     150     Target HR (bpm):       128      % MaxPHR:     46      Double Product:       [id number]      BP Response:       Stress Termination:       COMPLETED PROTOCOL      Stress Symptoms:   Chest Pressure,short of breath,no chest pain,headache      ECG      Resting ECG:      Stress ECG:      IMAGE PROTOCOL      Rest/Stress 1                        Day              RadiopharmaceuticalDose (mCi)   Imaging  Date      Imaging  Time           Inj to Img Time (min)   Rest:   Tc-99m             7.7          21-May-2023        14:43           Tetrofosmin   Stress: Tc-99m             27.7         21-May-2023        15:35           Tetrofosmin      Rest:   Administration Site:       Other   Administered by:      Michelle Hoskins      Stress:   Administration Site:       Left antecubital                               fossa   Administered by:      Michelle Hoskins      % Percent HR Achieved:   SPECT RESULTS      Technical Quality:       Good      Raw Data Analysis:   Summed Stress Score:    1   Summed Rest Score:    2   Summed Difference Score:        1   PERFUSION:   No persistent or reversible perfusion defects.      FUNCTIONAL RESULTS (calculated via Gated SPECT)      Stress Image LV EF:        65     %      Upper Normal Limit      Stress EDV:      102    ml   EDVI:    54      ml/m2      Stress ESV:      36     ml   ESVI:    19      ml/m2      TID:    1.13   TID - 1.19      TID (ed) - 1.23   LV Function:   LEFT ventricular cavity size is within normal limits.    Gated images show normal LEFT ventricular wall motion and thickening.                           Oseas Malave MD   (Electronically Signed)   Final Date:      21 May 2023 16:04       LABORATORY  Lab Results   Component Value Date    SODIUM 142 05/21/2023    POTASSIUM 4.4 05/21/2023    CHLORIDE 107 05/21/2023    CO2 26 05/21/2023    GLUCOSE 138 (H) 05/21/2023    BUN 11 05/21/2023    CREATININE 1.04 05/21/2023        Lab Results   Component Value Date    WBC 4.3 (L) 05/21/2023    HEMOGLOBIN 15.3 05/21/2023    HEMATOCRIT 46.5 05/21/2023    PLATELETCT 153 (L) 05/21/2023        Total time of the  discharge process exceeds 39 minutes.

## 2023-05-22 NOTE — CARE PLAN
Problem: Pain - Standard  Goal: Alleviation of pain or a reduction in pain to the patient’s comfort goal  Outcome: Progressing  Flowsheets (Taken 5/20/2023 1715 by Terri Zimmerman REllaNElla)  Pain Rating Scale (NPRS): 2     Problem: Knowledge Deficit - Standard  Goal: Patient and family/care givers will demonstrate understanding of plan of care, disease process/condition, diagnostic tests and medications  Outcome: Progressing     Problem: Psychosocial  Goal: Patient's level of anxiety will decrease  Outcome: Progressing  Flowsheets (Taken 5/20/2023 2148)  Decrease Anxiety Level: Collaborated with patient to identify and develop coping strategies     Problem: Discharge Barriers/Planning  Goal: Patient's continuum of care needs are met  Outcome: Progressing  Flowsheets (Taken 5/20/2023 2148)  Continuum of Care Needs:   Assessed for discharge barriers   Communicated discharge barriers to interdisciplinary tream     Problem: Hemodynamics  Goal: Patient's hemodynamics, fluid balance and neurologic status will be stable or improve  Outcome: Progressing   The patient is Stable - Low risk of patient condition declining or worsening    Shift Goals  Clinical Goals: stresst test, echo  Patient Goals: rest    Progress made toward(s) clinical / shift goals:  improving    Patient is not progressing towards the following goals:      
The patient is Stable - Low risk of patient condition declining or worsening    Shift Goals  Clinical Goals: Stress test today  Patient Goals: Rest  Family Goals: No family present    Progress made toward(s) clinical / shift goals:    Problem: Pain - Standard  Goal: Alleviation of pain or a reduction in pain to the patient’s comfort goal  Outcome: Met     Problem: Knowledge Deficit - Standard  Goal: Patient and family/care givers will demonstrate understanding of plan of care, disease process/condition, diagnostic tests and medications  Outcome: Met     Problem: Psychosocial  Goal: Patient's level of anxiety will decrease  Outcome: Met     Problem: Discharge Barriers/Planning  Goal: Patient's continuum of care needs are met  Outcome: Met     Problem: Hemodynamics  Goal: Patient's hemodynamics, fluid balance and neurologic status will be stable or improve  Outcome: Met     Patient completed stress test and echocardiogram. Results read by physician and the patient was updated. Patient's hemodynamics remained stable throughout the shift and the patient declined pain throughout the day.       
(0) independent

## 2023-05-22 NOTE — PROGRESS NOTES
IV removed, discharge paperwork reviewed and signed, and all belongings with the patient upon discharge which included home medications. Patient understands he needs to call his PCP to inform about the hospital stay and an appointment is already scheduled for the patient.

## 2023-06-08 ENCOUNTER — OFFICE VISIT (OUTPATIENT)
Dept: MEDICAL GROUP | Facility: PHYSICIAN GROUP | Age: 68
End: 2023-06-08
Payer: COMMERCIAL

## 2023-06-08 VITALS
SYSTOLIC BLOOD PRESSURE: 130 MMHG | WEIGHT: 164.4 LBS | HEART RATE: 85 BPM | TEMPERATURE: 98.3 F | RESPIRATION RATE: 14 BRPM | DIASTOLIC BLOOD PRESSURE: 58 MMHG | OXYGEN SATURATION: 96 % | BODY MASS INDEX: 24.92 KG/M2 | HEIGHT: 68 IN

## 2023-06-08 DIAGNOSIS — E11.9 DIABETES (HCC): ICD-10-CM

## 2023-06-08 DIAGNOSIS — J45.40 MODERATE PERSISTENT ASTHMA WITHOUT STATUS ASTHMATICUS WITHOUT COMPLICATION: ICD-10-CM

## 2023-06-08 DIAGNOSIS — K21.9 GASTROESOPHAGEAL REFLUX DISEASE WITHOUT ESOPHAGITIS: ICD-10-CM

## 2023-06-08 PROCEDURE — 3075F SYST BP GE 130 - 139MM HG: CPT | Performed by: NURSE PRACTITIONER

## 2023-06-08 PROCEDURE — 99214 OFFICE O/P EST MOD 30 MIN: CPT | Performed by: NURSE PRACTITIONER

## 2023-06-08 PROCEDURE — 3078F DIAST BP <80 MM HG: CPT | Performed by: NURSE PRACTITIONER

## 2023-06-08 RX ORDER — FAMOTIDINE 10 MG
10 TABLET ORAL 2 TIMES DAILY
Qty: 60 TABLET | Refills: 1 | Status: SHIPPED
Start: 2023-06-08 | End: 2024-01-02

## 2023-06-08 ASSESSMENT — ENCOUNTER SYMPTOMS
SHORTNESS OF BREATH: 0
HEARTBURN: 1
FEVER: 0
ABDOMINAL PAIN: 1
NAUSEA: 0
CONSTIPATION: 0
DIARRHEA: 0
WEIGHT LOSS: 0
CHILLS: 0
VOMITING: 0

## 2023-06-08 ASSESSMENT — FIBROSIS 4 INDEX: FIB4 SCORE: 2.078698548207745214

## 2023-06-09 NOTE — PROGRESS NOTES
"CC:  Chief Complaint   Patient presents with    Follow-Up     Hospital, chest pain, stomach    Referral Needed     New GI        HISTORY OF PRESENT ILLNESS: Patient is a 68 y.o. male established patient presenting     Problem   Diabetes (Hcc)    was recently seen in hospital for chest pain and cleared cardiac wise  States he was told he had diabetes with a HA1c of 6.2%     Gastroesophageal Reflux Disease Without Esophagitis    Patient states that over the last few weeks he has had a worsening sensation of his throat and is noticing a more hoarse voice.  He currently takes Nexium 40 mg daily and follows with gastroenterology.  He notes that he is supposed to be following up soon for an MRI of his abdomen per recommendations from gastroenterology.  He states that he is having continued discomfort to his throat that radiates down his chest at times in his stomach   Notes that he is having a hard time sleeping constantly has to clear his throat.  He does state that he drinks beer on a daily basis however he stays away from citrus and spicy foods.  Has a hard time sleeping, constatnt clearing throat  Patient denies smoking and notes he only drinks coffee 1 cup a day       Asthma Without Status Asthmaticus    compketed breathig ntest today with [facility]            Review of Systems   Constitutional:  Negative for chills, fever and weight loss.   Respiratory:  Negative for shortness of breath.    Cardiovascular:  Negative for chest pain.   Gastrointestinal:  Positive for abdominal pain and heartburn. Negative for constipation, diarrhea, nausea and vomiting.             - NOTE: All other systems reviewed and are negative, except as in HPI.      Exam:    /58 (BP Location: Left arm, Patient Position: Sitting, BP Cuff Size: Adult)   Pulse 85   Temp 36.8 °C (98.3 °F) (Temporal)   Resp 14   Ht 1.727 m (5' 8\")   Wt 74.6 kg (164 lb 6.4 oz)   SpO2 96%  Body mass index is 25 kg/m².    Physical " Exam  Constitutional:       Appearance: Normal appearance. He is normal weight.   HENT:      Head: Normocephalic and atraumatic.   Cardiovascular:      Rate and Rhythm: Normal rate and regular rhythm.      Pulses: Normal pulses.      Heart sounds: Normal heart sounds.   Pulmonary:      Effort: Pulmonary effort is normal.      Breath sounds: Normal breath sounds.   Musculoskeletal:         General: Normal range of motion.      Cervical back: Normal range of motion.   Skin:     General: Skin is warm and dry.   Neurological:      Mental Status: He is alert and oriented to person, place, and time.   Psychiatric:         Mood and Affect: Mood normal.         Behavior: Behavior normal.         Thought Content: Thought content normal.         Judgment: Judgment normal.           Assessment/Plan:    1. Gastroesophageal reflux disease without esophagitis  Chronic and exacerbated condition   - esomeprazole (NEXIUM) 20 MG capsule; Take 1 Capsule by mouth 2 times a day.  Dispense: 120 Capsule; Refill: 0  - famotidine (PEPCID) 10 MG tablet; Take 1 Tablet by mouth 2 times a day.  Dispense: 60 Tablet; Refill: 1  - Referral to Gastroenterology    2. Diabetes (HCC)  Acute and uncomplicated condition   - POCT Retinal Eye Exam  - Diabetic Monofilament LE Exam         Discussed with patient possible alternative diagnoses, patient is to take all medications as prescribed.     If symptoms persist FU w/PCP, if symptoms worsen go to emergency room.     If experiencing any side effects from prescribed medications reports to the office immediately or go to emergency room.    Reviewed indication, dosage, usage and potential adverse effects of prescribed medications.     Reviewed risks and benefits of treatment plan. Patient verbalizes understanding of all instruction and verbally agrees to plan.      Return in about 3 weeks (around 6/29/2023).      Please note that this dictation was created using voice recognition software. I have made every  reasonable attempt to correct obvious errors, but I expect that there are errors of grammar and possibly content that I did not discover before finalizing the note.

## 2023-06-27 ENCOUNTER — TELEPHONE (OUTPATIENT)
Dept: MEDICAL GROUP | Facility: PHYSICIAN GROUP | Age: 68
End: 2023-06-27
Payer: COMMERCIAL

## 2023-06-27 NOTE — TELEPHONE ENCOUNTER
Eliza in [place] called stated that pt stated he was supposed to be taking double of the 40 mg esomeprazole per your last note and the med list pt was to take 20 mg bid and 10 mg famotidine. Does pt need to take 40 mg nexium?

## 2023-06-30 ENCOUNTER — TELEPHONE (OUTPATIENT)
Dept: MEDICAL GROUP | Facility: PHYSICIAN GROUP | Age: 68
End: 2023-06-30
Payer: COMMERCIAL

## 2023-06-30 NOTE — TELEPHONE ENCOUNTER
LVM for pt to clarify directions for Nexium and Pepcid also follow up in 2 months to reevaluate, per PCP Patient to be taking Nexium 20 mg twice daily and Pepcid 10 mg daily.  Please have patient follow-up in 2 months to reevaluate symptoms

## 2023-07-07 ENCOUNTER — TELEPHONE (OUTPATIENT)
Dept: MEDICAL GROUP | Facility: PHYSICIAN GROUP | Age: 68
End: 2023-07-07
Payer: COMMERCIAL

## 2023-07-07 DIAGNOSIS — K21.9 GASTROESOPHAGEAL REFLUX DISEASE WITHOUT ESOPHAGITIS: ICD-10-CM

## 2023-07-07 RX ORDER — DEXLANSOPRAZOLE 60 MG/1
60 CAPSULE, DELAYED RELEASE ORAL DAILY
Qty: 90 CAPSULE | Refills: 0 | Status: SHIPPED
Start: 2023-07-07 | End: 2024-01-02

## 2023-07-07 NOTE — TELEPHONE ENCOUNTER
Pt came in office to go over his medication, he is currently taking dexlansoprazole 60mg and famotidine 10mg. He states this 2 medication helps him a lot better than esomeprazole. He also states he already has an ppt with ENT.  Would you like to see pt for appt in regards this medication?

## 2023-07-26 ENCOUNTER — HOSPITAL ENCOUNTER (OUTPATIENT)
Facility: MEDICAL CENTER | Age: 68
End: 2023-07-26
Attending: UROLOGY
Payer: COMMERCIAL

## 2023-07-26 LAB
FORWARD REASON: SPWHY: NORMAL
FORWARDED TO LAB: SPWHR: NORMAL
SPECIMEN SENT: SPWT1: NORMAL

## 2023-10-04 ENCOUNTER — OFFICE VISIT (OUTPATIENT)
Dept: URGENT CARE | Facility: PHYSICIAN GROUP | Age: 68
End: 2023-10-04
Payer: COMMERCIAL

## 2023-10-04 ENCOUNTER — APPOINTMENT (OUTPATIENT)
Dept: RADIOLOGY | Facility: IMAGING CENTER | Age: 68
End: 2023-10-04
Attending: NURSE PRACTITIONER
Payer: COMMERCIAL

## 2023-10-04 ENCOUNTER — HOSPITAL ENCOUNTER (OUTPATIENT)
Dept: LAB | Facility: MEDICAL CENTER | Age: 68
End: 2023-10-04
Attending: NURSE PRACTITIONER
Payer: COMMERCIAL

## 2023-10-04 VITALS
BODY MASS INDEX: 24.55 KG/M2 | HEIGHT: 68 IN | OXYGEN SATURATION: 98 % | RESPIRATION RATE: 16 BRPM | SYSTOLIC BLOOD PRESSURE: 126 MMHG | TEMPERATURE: 97.8 F | DIASTOLIC BLOOD PRESSURE: 78 MMHG | HEART RATE: 58 BPM | WEIGHT: 162 LBS

## 2023-10-04 DIAGNOSIS — R06.02 SHORTNESS OF BREATH: ICD-10-CM

## 2023-10-04 DIAGNOSIS — J44.1 CHRONIC OBSTRUCTIVE PULMONARY DISEASE WITH ACUTE EXACERBATION (HCC): ICD-10-CM

## 2023-10-04 LAB — D DIMER PPP IA.FEU-MCNC: 0.29 UG/ML (FEU) (ref 0–0.5)

## 2023-10-04 PROCEDURE — 99214 OFFICE O/P EST MOD 30 MIN: CPT | Performed by: NURSE PRACTITIONER

## 2023-10-04 PROCEDURE — 36415 COLL VENOUS BLD VENIPUNCTURE: CPT

## 2023-10-04 PROCEDURE — 3078F DIAST BP <80 MM HG: CPT | Performed by: NURSE PRACTITIONER

## 2023-10-04 PROCEDURE — 85379 FIBRIN DEGRADATION QUANT: CPT

## 2023-10-04 PROCEDURE — 3074F SYST BP LT 130 MM HG: CPT | Performed by: NURSE PRACTITIONER

## 2023-10-04 PROCEDURE — 71046 X-RAY EXAM CHEST 2 VIEWS: CPT | Mod: TC,FY | Performed by: RADIOLOGY

## 2023-10-04 RX ORDER — PREDNISONE 20 MG/1
40 TABLET ORAL DAILY
Qty: 10 TABLET | Refills: 0 | Status: SHIPPED | OUTPATIENT
Start: 2023-10-04 | End: 2023-10-09

## 2023-10-04 RX ORDER — FLUTICASONE PROPIONATE AND SALMETEROL 250; 50 UG/1; UG/1
1 POWDER RESPIRATORY (INHALATION) EVERY 12 HOURS
Qty: 1 EACH | Refills: 1 | Status: SHIPPED | OUTPATIENT
Start: 2023-10-04 | End: 2023-12-03

## 2023-10-04 ASSESSMENT — FIBROSIS 4 INDEX: FIB4 SCORE: 2.078698548207745214

## 2023-10-04 ASSESSMENT — ENCOUNTER SYMPTOMS: SHORTNESS OF BREATH: 1

## 2023-10-04 NOTE — PROGRESS NOTES
Subjective:     Adrián Sheldon is a 68 y.o. male who presents for Chest Pain ((R) side, pt states it feels like his lungs are sore. Hx of copd and asthma ) and Shortness of Breath (X 2 months)      Chest Pain   Associated symptoms include shortness of breath.   Shortness of Breath  Associated symptoms include chest pain.     Pt presents for evaluation of a new problem. Adrián is a very pleasant 68-year-old male who presents urgent care today with complaints of shortness of breath right-sided chest tightness and pain for the past 2 months.  He does suffer from asthma and COPD.  He is a past smoker with a 40-year pack history.  He does endorse wheezing at night with supine position.  There has been no cough.  He states that he also suffers from severe acid reflux.  There is no relief with his albuterol inhaler.    Review of Systems   Respiratory:  Positive for shortness of breath.    Cardiovascular:  Positive for chest pain.       PMH:   Past Medical History:   Diagnosis Date    Arthritis     Asthma without status asthmaticus 03/07/2020    Benign prostatic hyperplasia with urinary obstruction 3/3/2020    Bronchitis     Carotid artery stenosis, asymptomatic, right 08/28/2020    Cataract     Chest pain     Chest tightness     Constipation     COPD (chronic obstructive pulmonary disease) ([facility]) 03/07/2020    COPD (chronic obstructive pulmonary disease) ([facility]) 3/7/2020    Diabetes ([facility]) 5/20/2023    Frequent urination     Mongolian measles     H. pylori infection 2/8/2023    Hearing difficulty     Heartburn     Hypertension     Incomplete emptying of bladder 3/3/2020    Intercostal pain, R side, musculoskeletal chondritis 4/20/2020    Lesion of penis 3/3/2020    Nocturia 3/3/2020    Painful joint     Raised prostate specific antigen 3/3/2020    Shortness of breath     Sweat, sweating, excessive     Vision loss     Wears glasses     Wheezing      ALLERGIES: No Known Allergies  SURGHX:   Past Surgical History:   Procedure  "Laterality Date    PROSTATECTOMY, RADICAL RETRO  2022    LAMINOTOMY  2022    L3-L4     SOCHX:   Social History     Socioeconomic History    Marital status:    Tobacco Use    Smoking status: Former     Current packs/day: 0.00     Average packs/day: 1 pack/day for 40.0 years (40.0 ttl pk-yrs)     Types: Cigarettes     Start date: 1980     Quit date: 2019     Years since quittin.6     Passive exposure: Past    Smokeless tobacco: Never   Vaping Use    Vaping Use: Never used   Substance and Sexual Activity    Alcohol use: Yes     Comment: moderately     Drug use: No    Sexual activity: Not Currently     Partners: Female     FH:   Family History   Problem Relation Age of Onset    No Known Problems Mother     Heart Attack Father 70    Heart Disease Father     Heart Attack Brother 30    Heart Disease Brother          Objective:   /78   Pulse (!) 58   Temp 36.6 °C (97.8 °F) (Temporal)   Resp 16   Ht 1.727 m (5' 8\")   Wt 73.5 kg (162 lb)   SpO2 98%   BMI 24.63 kg/m²     Physical Exam  Vitals and nursing note reviewed.   Constitutional:       General: He is not in acute distress.     Appearance: Normal appearance. He is normal weight. He is not ill-appearing or toxic-appearing.   HENT:      Head: Normocephalic.      Right Ear: External ear normal.      Left Ear: External ear normal.      Nose: Nose normal.      Mouth/Throat:      Mouth: Mucous membranes are moist.   Eyes:      General:         Right eye: No discharge.         Left eye: No discharge.      Extraocular Movements: Extraocular movements intact.      Conjunctiva/sclera: Conjunctivae normal.      Pupils: Pupils are equal, round, and reactive to light.   Cardiovascular:      Rate and Rhythm: Regular rhythm. Bradycardia present.   Pulmonary:      Effort: Pulmonary effort is normal. No respiratory distress.      Breath sounds: Normal breath sounds. No stridor. No wheezing, rhonchi or rales.   Chest:      Chest wall: No tenderness. "   Abdominal:      General: Abdomen is flat.   Musculoskeletal:         General: Normal range of motion.      Cervical back: Normal range of motion and neck supple. No rigidity.   Lymphadenopathy:      Cervical: No cervical adenopathy.   Skin:     General: Skin is warm and dry.   Neurological:      General: No focal deficit present.      Mental Status: He is alert and oriented to person, place, and time. Mental status is at baseline.   Psychiatric:         Mood and Affect: Mood normal.         Behavior: Behavior normal.         Judgment: Judgment normal.       DX-CHEST-2 VIEWS    Result Date: 10/4/2023  10/4/2023 11:23 AM HISTORY/REASON FOR EXAM:  Shortness of Breath; Chest pain on right side, SOB X 2 months, Hx of COPD 40 pack year TECHNIQUE/EXAM DESCRIPTION AND NUMBER OF VIEWS: Two views of the chest. COMPARISON:  5/20/2023 FINDINGS: The mediastinal and cardiac silhouette is unremarkable. The pulmonary vascularity is within normal limits. There is no pneumonia. There is linear scarring in the left lower lobe/lingula. There is no significant pleural effusion. There is no visible pneumothorax. There are no acute bony abnormalities.     1.  No evidence of acute cardiopulmonary process.     Assessment/Plan:   Assessment      1. Shortness of breath  DX-CHEST-2 VIEWS    D-DIMER      2. Chronic obstructive pulmonary disease with acute exacerbation (HCC)  fluticasone-salmeterol (ADVAIR) 250-50 MCG/ACT AEROSOL POWDER, BREATH ACTIVATED    predniSONE (DELTASONE) 20 MG Tab        X-ray results discussed with patient in the clinic today.  I did personally review his x-ray and agree with the radiologist interpretation of the findings.  Differential diagnoses discussed.  He did have a CT scan chest with contrast in May of this year.  No abnormal findings at this time.  It is likely that he is suffering from asthma versus COPD exacerbation.  He was started on Advair and given 5 days of prednisone for relief of shortness of breath  and wheezing.  D-dimer ordered and if this is elevated we will consider reordering CT scan with contrast.

## 2023-12-27 ENCOUNTER — OFFICE VISIT (OUTPATIENT)
Dept: URGENT CARE | Facility: PHYSICIAN GROUP | Age: 68
End: 2023-12-27
Payer: COMMERCIAL

## 2023-12-27 VITALS
WEIGHT: 164.8 LBS | SYSTOLIC BLOOD PRESSURE: 150 MMHG | RESPIRATION RATE: 20 BRPM | TEMPERATURE: 98.1 F | BODY MASS INDEX: 24.98 KG/M2 | DIASTOLIC BLOOD PRESSURE: 60 MMHG | HEIGHT: 68 IN | HEART RATE: 78 BPM | OXYGEN SATURATION: 96 %

## 2023-12-27 DIAGNOSIS — J44.1 COPD EXACERBATION (HCC): ICD-10-CM

## 2023-12-27 PROCEDURE — 3078F DIAST BP <80 MM HG: CPT | Performed by: FAMILY MEDICINE

## 2023-12-27 PROCEDURE — 3077F SYST BP >= 140 MM HG: CPT | Performed by: FAMILY MEDICINE

## 2023-12-27 PROCEDURE — 99213 OFFICE O/P EST LOW 20 MIN: CPT | Performed by: FAMILY MEDICINE

## 2023-12-27 RX ORDER — ALBUTEROL SULFATE 2.5 MG/3ML
SOLUTION RESPIRATORY (INHALATION)
COMMUNITY

## 2023-12-27 RX ORDER — CLOTRIMAZOLE 10 MG/1
LOZENGE ORAL; TOPICAL
COMMUNITY
End: 2024-01-02

## 2023-12-27 RX ORDER — PREDNISONE 20 MG/1
TABLET ORAL
COMMUNITY
End: 2024-01-02

## 2023-12-27 RX ORDER — DOXYCYCLINE HYCLATE 100 MG
TABLET ORAL
COMMUNITY
End: 2024-01-02

## 2023-12-27 RX ORDER — HYDROCODONE BITARTRATE AND ACETAMINOPHEN 10; 325 MG/1; MG/1
TABLET ORAL
COMMUNITY
End: 2024-01-02

## 2023-12-27 RX ORDER — FLUTICASONE PROPIONATE 50 MCG
SPRAY, SUSPENSION (ML) NASAL
COMMUNITY
End: 2024-01-02

## 2023-12-27 RX ORDER — TIZANIDINE 2 MG/1
TABLET ORAL
COMMUNITY
End: 2024-01-02

## 2023-12-27 RX ORDER — PREDNISONE 10 MG/1
TABLET ORAL
COMMUNITY
End: 2024-01-02

## 2023-12-27 RX ORDER — SULFAMETHOXAZOLE AND TRIMETHOPRIM 800; 160 MG/1; MG/1
TABLET ORAL
COMMUNITY
End: 2024-01-02

## 2023-12-27 RX ORDER — DOXYCYCLINE HYCLATE 100 MG/1
CAPSULE ORAL
COMMUNITY
End: 2024-01-02

## 2023-12-27 RX ORDER — FAMOTIDINE 40 MG/1
TABLET, FILM COATED ORAL
COMMUNITY
End: 2024-01-02

## 2023-12-27 RX ORDER — HYDROCODONE BITARTRATE AND ACETAMINOPHEN 5; 325 MG/1; MG/1
TABLET ORAL
COMMUNITY
End: 2024-01-02

## 2023-12-27 RX ORDER — PREDNISONE 20 MG/1
TABLET ORAL
Qty: 18 TABLET | Refills: 0 | Status: SHIPPED | OUTPATIENT
Start: 2023-12-27 | End: 2024-01-02

## 2023-12-27 RX ORDER — ALBUTEROL SULFATE 90 UG/1
2 AEROSOL, METERED RESPIRATORY (INHALATION) EVERY 6 HOURS
COMMUNITY
End: 2024-01-02

## 2023-12-27 RX ORDER — MELOXICAM 15 MG/1
1 TABLET ORAL
COMMUNITY
End: 2024-01-02

## 2023-12-27 RX ORDER — CIPROFLOXACIN 500 MG/1
TABLET, FILM COATED ORAL
COMMUNITY
End: 2024-01-02

## 2023-12-27 RX ORDER — IPRATROPIUM BROMIDE AND ALBUTEROL SULFATE 2.5; .5 MG/3ML; MG/3ML
SOLUTION RESPIRATORY (INHALATION)
COMMUNITY
End: 2024-01-02

## 2023-12-27 RX ORDER — CYCLOBENZAPRINE HCL 10 MG
TABLET ORAL
COMMUNITY
End: 2024-01-02

## 2023-12-27 RX ORDER — PREDNISONE 5 MG/1
TABLET ORAL
COMMUNITY
End: 2024-01-02

## 2023-12-27 RX ORDER — ESOMEPRAZOLE MAGNESIUM 40 MG/1
CAPSULE, DELAYED RELEASE ORAL
COMMUNITY
End: 2024-01-02

## 2023-12-27 RX ORDER — SUCRALFATE 1 G/1
TABLET ORAL
COMMUNITY
End: 2024-01-02

## 2023-12-27 RX ORDER — PANTOPRAZOLE SODIUM 40 MG/1
TABLET, DELAYED RELEASE ORAL
COMMUNITY
End: 2024-01-02

## 2023-12-27 RX ORDER — FINASTERIDE 5 MG/1
TABLET, FILM COATED ORAL
COMMUNITY
End: 2024-01-02

## 2023-12-27 RX ORDER — POLYETHYLENE GLYCOL 3350, SODIUM CHLORIDE, SODIUM BICARBONATE, POTASSIUM CHLORIDE 420; 11.2; 5.72; 1.48 G/4L; G/4L; G/4L; G/4L
POWDER, FOR SOLUTION ORAL
COMMUNITY
End: 2024-01-02

## 2023-12-27 RX ORDER — OMEPRAZOLE 40 MG/1
CAPSULE, DELAYED RELEASE ORAL
COMMUNITY
End: 2024-01-02

## 2023-12-27 ASSESSMENT — ENCOUNTER SYMPTOMS
GASTROINTESTINAL NEGATIVE: 1
CARDIOVASCULAR NEGATIVE: 1
SHORTNESS OF BREATH: 1
CONSTITUTIONAL NEGATIVE: 1
WHEEZING: 1

## 2023-12-27 ASSESSMENT — FIBROSIS 4 INDEX: FIB4 SCORE: 2.078698548207745214

## 2023-12-27 NOTE — PROGRESS NOTES
"Subjective:   Adrián Sheldon is a 68 y.o. male who presents for Shortness of Breath (Has COPD And asthma. Pain in chest when breathing as well as wheezing )      Shortness of Breath  Associated symptoms include wheezing.       Review of Systems   Constitutional: Negative.    HENT: Negative.     Respiratory:  Positive for shortness of breath and wheezing.    Cardiovascular: Negative.    Gastrointestinal: Negative.    Genitourinary: Negative.        Medications, Allergies, and current problem list reviewed today in Epic.     Objective:     BP (!) 150/60   Pulse 78   Temp 36.7 °C (98.1 °F) (Temporal)   Resp 20   Ht 1.727 m (5' 8\")   Wt 74.8 kg (164 lb 12.8 oz)   SpO2 96%     Physical Exam  Vitals and nursing note reviewed.   Constitutional:       Appearance: Normal appearance.   HENT:      Head: Normocephalic and atraumatic.      Nose: Nose normal.      Mouth/Throat:      Pharynx: Oropharynx is clear.   Cardiovascular:      Rate and Rhythm: Normal rate and regular rhythm.      Pulses: Normal pulses.      Heart sounds: Normal heart sounds.   Pulmonary:      Breath sounds: Wheezing present.   Abdominal:      General: Abdomen is flat. Bowel sounds are normal.      Palpations: Abdomen is soft.   Musculoskeletal:      Cervical back: Normal range of motion and neck supple.   Neurological:      Mental Status: He is alert.         Assessment/Plan:     Diagnosis and associated orders:     1. COPD exacerbation (HCC)  predniSONE (DELTASONE) 20 MG Tab         Comments/MDM:     Continue inhalers, follow up with pulmonology         Differential diagnosis, natural history, supportive care, and indications for immediate follow-up discussed.    Advised the patient to follow-up with the primary care physician for recheck, reevaluation, and consideration of further management.    Please note that this dictation was created using voice recognition software. I have made a reasonable attempt to correct obvious errors, but I expect " that there are errors of grammar and possibly content that I did not discover before finalizing the note.    This note was electronically signed by Antoni Yip M.D.

## 2024-01-02 ENCOUNTER — OFFICE VISIT (OUTPATIENT)
Dept: MEDICAL GROUP | Facility: PHYSICIAN GROUP | Age: 69
End: 2024-01-02
Payer: COMMERCIAL

## 2024-01-02 VITALS
WEIGHT: 164.6 LBS | BODY MASS INDEX: 24.95 KG/M2 | TEMPERATURE: 97 F | OXYGEN SATURATION: 97 % | HEIGHT: 68 IN | HEART RATE: 70 BPM | RESPIRATION RATE: 20 BRPM | DIASTOLIC BLOOD PRESSURE: 88 MMHG | SYSTOLIC BLOOD PRESSURE: 138 MMHG

## 2024-01-02 DIAGNOSIS — K21.9 GASTROESOPHAGEAL REFLUX DISEASE WITHOUT ESOPHAGITIS: ICD-10-CM

## 2024-01-02 DIAGNOSIS — J45.30 MILD PERSISTENT ASTHMA WITHOUT COMPLICATION: ICD-10-CM

## 2024-01-02 DIAGNOSIS — Z87.891 FORMER SMOKER: ICD-10-CM

## 2024-01-02 DIAGNOSIS — E78.49 OTHER HYPERLIPIDEMIA: ICD-10-CM

## 2024-01-02 DIAGNOSIS — I10 PRIMARY HYPERTENSION: ICD-10-CM

## 2024-01-02 DIAGNOSIS — Z87.438 HISTORY OF BPH: ICD-10-CM

## 2024-01-02 DIAGNOSIS — Z11.59 NEED FOR HEPATITIS C SCREENING TEST: ICD-10-CM

## 2024-01-02 DIAGNOSIS — R79.89 DECREASED TESTOSTERONE LEVEL: ICD-10-CM

## 2024-01-02 DIAGNOSIS — A04.8 H. PYLORI INFECTION: ICD-10-CM

## 2024-01-02 DIAGNOSIS — N52.01 ERECTILE DYSFUNCTION DUE TO ARTERIAL INSUFFICIENCY: ICD-10-CM

## 2024-01-02 DIAGNOSIS — E11.9 DIABETES (HCC): ICD-10-CM

## 2024-01-02 DIAGNOSIS — E55.9 VITAMIN D DEFICIENCY: ICD-10-CM

## 2024-01-02 DIAGNOSIS — E29.1 MALE HYPOGONADISM: ICD-10-CM

## 2024-01-02 DIAGNOSIS — J45.40 MODERATE PERSISTENT ASTHMA WITHOUT STATUS ASTHMATICUS WITHOUT COMPLICATION: ICD-10-CM

## 2024-01-02 DIAGNOSIS — Z86.73 HISTORY OF TIA (TRANSIENT ISCHEMIC ATTACK): ICD-10-CM

## 2024-01-02 DIAGNOSIS — Z00.00 MEDICARE ANNUAL WELLNESS VISIT, SUBSEQUENT: Primary | ICD-10-CM

## 2024-01-02 DIAGNOSIS — R23.2 HOT FLASH IN MALE: ICD-10-CM

## 2024-01-02 PROBLEM — D75.1 SECONDARY POLYCYTHEMIA: Status: ACTIVE | Noted: 2023-09-05

## 2024-01-02 PROBLEM — M54.12 CERVICAL RADICULOPATHY: Status: ACTIVE | Noted: 2022-11-01

## 2024-01-02 PROBLEM — M54.16 LUMBAR RADICULOPATHY: Status: ACTIVE | Noted: 2022-11-08

## 2024-01-02 LAB
HBA1C MFR BLD: 7 % (ref ?–5.8)
POCT INT CON NEG: NEGATIVE
POCT INT CON POS: POSITIVE

## 2024-01-02 PROCEDURE — 3079F DIAST BP 80-89 MM HG: CPT | Performed by: NURSE PRACTITIONER

## 2024-01-02 PROCEDURE — 3075F SYST BP GE 130 - 139MM HG: CPT | Performed by: NURSE PRACTITIONER

## 2024-01-02 PROCEDURE — 83036 HEMOGLOBIN GLYCOSYLATED A1C: CPT | Performed by: NURSE PRACTITIONER

## 2024-01-02 PROCEDURE — G0438 PPPS, INITIAL VISIT: HCPCS | Performed by: NURSE PRACTITIONER

## 2024-01-02 RX ORDER — TAMSULOSIN HYDROCHLORIDE 0.4 MG/1
CAPSULE ORAL
COMMUNITY
End: 2024-01-02

## 2024-01-02 ASSESSMENT — FIBROSIS 4 INDEX: FIB4 SCORE: 2.078698548207745214

## 2024-01-02 ASSESSMENT — ENCOUNTER SYMPTOMS: GENERAL WELL-BEING: GOOD

## 2024-01-02 ASSESSMENT — PATIENT HEALTH QUESTIONNAIRE - PHQ9: CLINICAL INTERPRETATION OF PHQ2 SCORE: 0

## 2024-01-02 ASSESSMENT — ACTIVITIES OF DAILY LIVING (ADL): BATHING_REQUIRES_ASSISTANCE: 0

## 2024-01-02 NOTE — PROGRESS NOTES
Chief Complaint   Patient presents with    Annual Wellness Visit       HPI:  Adrián Sheldon is a 68 y.o. here for Medicare Annual Wellness Visit     Patient Active Problem List    Diagnosis Date Noted    Secondary polycythemia 09/05/2023    Diabetes (HCC) 05/20/2023    Vitamin D deficiency 04/02/2023    Hot flash in male 04/02/2023    Decreased testosterone level 04/02/2023    Male hypogonadism 03/30/2023    Gastroesophageal reflux disease without esophagitis 02/08/2023    H. pylori infection 02/08/2023    History of BPH 02/08/2023    Mild persistent asthma without complication 02/07/2023    Lumbar radiculopathy 11/08/2022    Cervical radiculopathy 11/01/2022    Erectile dysfunction due to arterial insufficiency 06/30/2022    History of TIA (transient ischemic attack) 08/28/2020    Other hyperlipidemia 08/28/2020    Former smoker 08/28/2020    Carotid artery stenosis, asymptomatic, right 08/28/2020    Asthma without status asthmaticus 03/07/2020    Chest pain 03/07/2020    HTN (hypertension) 11/22/2018       Current Outpatient Medications   Medication Sig Dispense Refill    albuterol (PROVENTIL) 2.5mg/3ml Nebu Soln solution for nebulization 3 mL inhaled every 6 hours, PRN SOB for 30 day(s)      amlodipine-benazepril (LOTREL) 5-20 MG per capsule TAKE ONE CAPSULE BY MOUTH EVERY DAY 90 Capsule 3    budesonide-formoterol (SYMBICORT) 160-4.5 MCG/ACT Aerosol Inhale 2 Puffs 2 times a day. Use with spacer.  Rinse mouth after each use. 1 Each 11    aspirin EC (ECOTRIN) 81 MG Tablet Delayed Response Take 81 mg by mouth every day.      albuterol 108 (90 Base) MCG/ACT Aero Soln inhalation aerosol Inhale 2 Puffs every 6 hours as needed for Shortness of Breath. (Patient not taking: Reported on 12/27/2023) 1 Each 2     No current facility-administered medications for this visit.          Current supplements as per medication list.     Allergies: Patient has no known allergies.    Current social contact/activities:   Works 7 days  on and 7 days off     He  reports that he quit smoking about 4 years ago. His smoking use included cigarettes. He started smoking about 43 years ago. He has a 40.0 pack-year smoking history. He has been exposed to tobacco smoke. He has never used smokeless tobacco. He reports current alcohol use. He reports that he does not use drugs.  Counseling given: Not Answered      ROS:    Gait: Uses no assistive device  Ostomy: No  Other tubes: No  Amputations: No  Chronic oxygen use: No  Last eye exam: Last year  Wears hearing aids: No   : Denies any urinary leakage during the last 6 months    Screening:    Depression Screening  Little interest or pleasure in doing things?  0 - not at all  Feeling down, depressed , or hopeless? 0 - not at all  Patient Health Questionnaire Score: 0     If depressive symptoms identified deferred to follow up visit unless specifically addressed in assessment and plan.    Interpretation of PHQ-9 Total Score   Score Severity   1-4 No Depression   5-9 Mild Depression   10-14 Moderate Depression   15-19 Moderately Severe Depression   20-27 Severe Depression    Screening for Cognitive Impairment  Do you or any of your friends or family members have any concern about your memory? No  Three Minute Recall (Banana, Sunrise, Chair) 1/3    Scar clock face with all 12 numbers and set the hands to show 20 past 8.  Yes    Cognitive concerns identified deferred for follow up unless specifically addressed in assessment and plan.    Fall Risk Assessment  Has the patient had two or more falls in the last year or any fall with injury in the last year?  No    Safety Assessment  Do you always wear your seatbelt?  Yes  Any changes to home needed to function safely? No  Difficulty hearing.  Yes  Patient counseled about all safety risks that were identified.    Functional Assessment ADLs  Are there any barriers preventing you from cooking for yourself or meeting nutritional needs?  No.    Are there any barriers  preventing you from driving safely or obtaining transportation?  No.    Are there any barriers preventing you from using a telephone or calling for help?  No    Are there any barriers preventing you from shopping?  No.    Are there any barriers preventing you from taking care of your own finances?  No    Are there any barriers preventing you from managing your medications?  No    Are there any barriers preventing you from showering, bathing or dressing yourself? No    Are there any barriers preventing you from doing housework or laundry? No  Are there any barriers preventing you from using the toilet?No  Are you currently engaging in any exercise or physical activity?  No.      Self-Assessment of Health  What is your perception of your health? Good  Do you sleep more than six hours a night? Yes  In the past 7 days, how much did pain keep you from doing your normal work? Some  Do you spend quality time with family or friends (virtually or in person)? Yes  Do you usually eat a heart healthy diet that constists of a variety of fruits, vegetables, whole grains and fiber? Yes  Do you eat foods high in fat and/or Fast Food more than three times per week? Yes    Advance Care Planning  Do you have an Advance Directive, Living Will, Durable Power of , or POLST? No                 Health Maintenance Summary            Ordered - Hepatitis C Screening (Once) Ordered on 1/2/2024      No completion history exists for this topic.              Ordered - Annual Pulmonary Function Test / Spirometry (Yearly) Ordered on 4/10/2023      02/24/2022  PULMONARY FUNCTION TESTS -Test requested: Complete Pulmonary Function Test              Postponed - Hepatitis B Vaccine (Hep B) (1 of 3 - Risk 3-dose series) Postponed until 1/2/2025      No completion history exists for this topic.              Postponed - IMM DTaP/Tdap/Td Vaccine (1 - Tdap) Postponed until 1/2/2025      No completion history exists for this topic.               Postponed - COVID-19 Vaccine (4 - 2023-24 season) Postponed until 1/2/2025 12/09/2021  Imm Admin: MODERNA SARS-COV-2 VACCINE (12+)    03/23/2021  Imm Admin: MODERNA SARS-COV-2 VACCINE (12+)    02/23/2021  Imm Admin: MODERNA SARS-COV-2 VACCINE (12+)              Diabetes: Urine Protein Screening (Yearly) Next due on 2/27/2024 02/27/2023  MICROALBUMIN CREAT RATIO URINE              Lung Cancer Screening (Yearly) Tentatively due on 5/20/2024 05/20/2023  CT-CTA CHEST PULMONARY ARTERY W/ RECONS    04/12/2022  CT-LUNG CANCER-SCREENING    05/11/2020  CT-CHEST (THORAX) WITH              Fasting Lipid Profile (Yearly) Tentatively due on 5/21/2024 05/21/2023  Lipid Profile    02/27/2023  Lipid Profile    06/07/2022  Lipid Profile    10/28/2021  Lipid Profile    03/08/2020  Lipid Profile (Lipid Panel) Fasting    Only the first 5 history entries have been loaded, but more history exists.              SERUM CREATININE (Yearly) Next due on 5/21/2024 05/21/2023  Basic Metabolic Panel (BMP)    05/20/2023  Complete Metabolic Panel (CMP)    02/27/2023  Comp Metabolic Panel    02/27/2023  Comp Metabolic Panel    06/07/2022  Comp Metabolic Panel    Only the first 5 history entries have been loaded, but more history exists.              Diabetes: Retinopathy Screening (Yearly) Next due on 6/8/2024 06/08/2023  RETINAL SCREENING RESULTS              Diabetes: Monofilament / LE Exam (Yearly) Tentatively due on 6/8/2024 06/08/2023  Diabetic Monofilament LE Exam              A1c Screening (Every 6 Months) Tentatively due on 7/2/2024 01/02/2024  POCT A1C    05/21/2023  HEMOGLOBIN A1C    07/19/2022  POCT  A1C    06/07/2022  HEMOGLOBIN A1C (Glycohemoglobin GHB Total/A1C with MBG Estimate)    11/23/2018  HEMOGLOBIN A1C              Colorectal Cancer Screening (Colonoscopy - Every 10 Years) Tentatively due on 3/11/2031      03/11/2021  AMB EXTERNAL COLONOSCOPY RESULTS    03/11/2021  COLONOSCOPY RESULTS               Pneumococcal Vaccine: 65+ Years (Series Information) Completed      02/08/2023  Imm Admin: Pneumococcal Conjugate Vaccine (PCV20)    11/23/2018  Imm Admin: Pneumococcal Conjugate Vaccine (Prevnar/PCV-13)              Abdominal Aortic Aneurysm (AAA) Screening  Tentatively Complete      04/12/2023  Outside Procedure: [facility] ABDOM,B-SCAN &/OR REAL TIME,COMPLETE    05/25/2022  US-ABDOMEN COMPLETE SURVEY              Influenza Vaccine (Series Information) Completed      11/09/2023  Imm Admin: Influenza Vaccine Adult HD    09/07/2023  Imm Admin: Influenza Vaccine Quad Inj (Pf)    11/23/2018  Imm Admin: Influenza Vaccine Adult HD              Zoster (Shingles) Vaccines (Series Information) Completed      11/09/2023  Imm Admin: Zoster Vaccine Recombinant (RZV) (SHINGRIX)    09/07/2023  Imm Admin: Zoster Vaccine Recombinant (RZV) (SHINGRIX)              Hepatitis A Vaccine (Hep A) (Series Information) Aged Out      No completion history exists for this topic.              HPV Vaccines (Series Information) Aged Out      No completion history exists for this topic.              Polio Vaccine (Inactivated Polio) (Series Information) Aged Out      No completion history exists for this topic.              Meningococcal Immunization (Series Information) Aged Out      No completion history exists for this topic.              Discontinued - Annual Wellness Visit  Discontinued        Frequency changed to Never automatically (Topic No Longer Applies)    01/02/2024  Level of Service: MD ANNUAL WELLNESS VISIT-INCLUDES PPPS SUBSEQUE*    01/02/2024  Visit Dx: Medicare annual wellness visit, subsequent              Discontinued - Lung Cancer Screening Shared Decision Making  Discontinued        Frequency changed to Never automatically (Topic No Longer Applies)    03/02/2022  Level of Service: MD COUNSELING LUNG CA SCREEN LDCT                    Patient Care Team:  ETHAN Ramos as PCP - General (Nurse Practitioner  Primary Care)  Rafita Acevedo P.A.-C. (Physician Assistant)  [facility] (Otolaryngology)        Social History     Tobacco Use    Smoking status: Former     Current packs/day: 0.00     Average packs/day: 1 pack/day for 40.0 years (40.0 ttl pk-yrs)     Types: Cigarettes     Start date: 1980     Quit date: 2019     Years since quittin.8     Passive exposure: Past    Smokeless tobacco: Never   Vaping Use    Vaping Use: Never used   Substance Use Topics    Alcohol use: Yes     Comment: moderately     Drug use: No     Family History   Problem Relation Age of Onset    No Known Problems Mother     Heart Attack Father 70    Heart Disease Father     Heart Attack Brother 30    Heart Disease Brother      He  has a past medical history of Arthritis, Asthma without status asthmaticus (2020), Benign prostatic hyperplasia with urinary obstruction (3/3/2020), Bronchitis, Carotid artery stenosis, asymptomatic, right (2020), Cataract, Chest pain, Chest tightness, Constipation, COPD (chronic obstructive pulmonary disease) ([facility]) (2020), COPD (chronic obstructive pulmonary disease) ([facility]) (3/7/2020), Diabetes ([facility]) (2023), Frequent urination, Hungarian measles, H. pylori infection (2023), Hearing difficulty, Heartburn, Hypertension, Incomplete emptying of bladder (3/3/2020), Intercostal pain, R side, musculoskeletal chondritis (2020), Lesion of penis (3/3/2020), Nocturia (3/3/2020), Painful joint, Raised prostate specific antigen (3/3/2020), Shortness of breath, Sweat, sweating, excessive, Vision loss, Wears glasses, and Wheezing.    He has no past medical history of Anginal syndrome ([facility]), Arrhythmia, At risk for sleep apnea, Back pain, Blood clotting disorder ([facility]), Cancer ([facility]), Congestive heart failure ([facility]), Cough, Dialysis patient ([facility]), Disorder of thyroid, Fall, Glaucoma, Gynecological disorder, Heart murmur, Heart valve disease, Indigestion, Jaundice, Myocardial infarct ([facility]),  "Pacemaker, Painful breathing, Pneumonia, Psychiatric problem, Renal disorder, Rheumatic fever, Seizure (HCC), Sputum production, or Urinary incontinence.   Past Surgical History:   Procedure Laterality Date    PROSTATECTOMY, RADICAL RETRO  12/2022    LAMINOTOMY  06/2022    L3-L4       Exam:   /88 (BP Location: Left arm, Patient Position: Sitting, BP Cuff Size: Adult)   Pulse 70   Temp 36.1 °C (97 °F) (Temporal)   Resp 20   Ht 1.727 m (5' 8\")   Wt 74.7 kg (164 lb 9.6 oz)   SpO2 97%  Body mass index is 25.03 kg/m².    Hearing good.    Dentition good  Alert, oriented in no acute distress.  Eye contact is good, speech goal directed, affect calm    Assessment and Plan. The following treatment and monitoring plan is recommended:      Problem   Secondary Polycythemia   Diabetes (Hcc)    Chronic and uncontrolled condition  Not on any medication at this time    Retinal exam completed: 6/2023  LDL: 40  ACEi/ARB? none  Statin? none  Urine Albumin/creatinine: due 2/2024  Tobacco use: none  BP control: 138/88  Concomitant HTN? Lotrel 5-20  A1C 7.0 %  A1C due: 6 months  Last monofilament exam- 6/2023  Patient tolerates medications well with no significant or bothersome side effects.   Denies polyuria, polydipsia, polyphagia.        Vitamin D Deficiency    Chronic and stable condition   does not take any vitamin D  In Feb 2023 his vitamin d was 12       Hot Flash in Male    Chronic and stable condition   Follows with urology   notes since starting his hormone pellets with urology he has been getting hot flashes   Has not had new labs since getting pellets in February      Decreased Testosterone Level    Chronic and stable condition   follows with urology   Has testosterone pellets   Has not had new labs since placement in February 2023     Male Hypogonadism    Chronic and stable condition   continues to follow with urology  Currently with hormone pellets  Has not had new labs since February 2023  the hormone pellets   "   Gastroesophageal Reflux Disease Without Esophagitis    Chronic and stable condition   stopped taking reflux medication as it was sometimes helping other times was not   Notes the gerd comes and goes  following up soon for an MRI of his abdomen per recommendations from gastroenterology.     Has a hard time sleeping, constant clearing throat  Patient denies smoking and notes he only drinks coffee 1 cup a day       H. Pylori Infection    Chronic and stable condition  Never followed back up with Dr. Simental  States he would like a new gastroenterology as he is feeling like his symptoms were never resolved     History of Bph    Chronic and stable condition   follows with urology   Not taking any medication to help with urination        Mild Persistent Asthma Without Complication    Chronic and stable condition  Continues to follow with pulmonology  Continues to use albuterol rescue inhaler as needed, Symbicort and nebulizer     Lumbar Radiculopathy   Cervical Radiculopathy   Erectile Dysfunction Due to Arterial Insufficiency    Chronic and stable condition  Continues to follow with urology  Currently using pellets for hormone  Has not had new labs since February 2023     History of Tia (Transient Ischemic Attack)    Chronic and stable condition  TIA on 11/2018  Continues to take aspirin, Lotrel     Other Hyperlipidemia    Chronic and stable condition   not on any medication at this time     Former Smoker    Chronic and stable condition  Continues not be smoking     Asthma Without Status Asthmaticus    Chronic and stable condition   Continues to follow pulmonology at [facility]   Was told to get chest xray but has not done it yet  Uses albuterol inhaler rescue, nebulizer, sympicort       Htn (Hypertension)    Chronic and stable condition   continues to take Lotrel 5-20  /88 in office          Services suggested: No services needed at this time  Health Care Screening: Age-appropriate preventive services  recommended by USPTF and ACIP covered by Medicare were discussed today. Services ordered if indicated and agreed upon by the patient.  Referrals offered: Community-based lifestyle interventions to reduce health risks and promote self-management and wellness, fall prevention, nutrition, physical activity, tobacco-use cessation, weight loss, and mental health services as per orders if indicated.    Discussion today about general wellness and lifestyle habits:    Prevent falls and reduce trip hazards; Cautioned about securing or removing rugs.  Have a working fire alarm and carbon monoxide detector;   Engage in regular physical activity and social activities     Follow-up: Return in about 2 weeks (around 1/16/2024) for follow up DM, Vitamin D.

## 2024-01-02 NOTE — LETTER
[facility]  ETHAN Ramos  [address]  [place] [facility] [place] [facility] [facility] [address]  Fax: [phone]   Authorization for Release/Disclosure of   Protected Health Information   Name: REJI MYAA : 1955 SSN: [id number]   Address: [address] [address] Phone:    [phone] (home) [phone] (work)   I authorize the entity listed below to release/disclose the PHI below to:   [facility]/ETHAN Ramos and ETHAN Ramos   Provider or Entity Name:  [facility], [facility], [place]  [address] [place] [address]  Phone:  [phone]     Fax:  [phone] fax    Reason for request: continuity of care   Information to be released:    [  ] LAST COLONOSCOPY,  including any PATH REPORT and follow-up  [  ] LAST FIT/COLOGUARD RESULT [  ] LAST DEXA  [  ] LAST MAMMOGRAM  [  ] LAST PAP  [  ] LAST LABS [  ] RETINA EXAM REPORT  [  ] IMMUNIZATION RECORDS  [ X ] Release all info      [  ] Check here and initial the line next to each item to release ALL health information INCLUDING  _____ Care and treatment for drug and / or alcohol abuse  _____ HIV testing, infection status, or AIDS  _____ Genetic Testing    DATES OF SERVICE OR TIME PERIOD TO BE DISCLOSED: _____________  I understand and acknowledge that:  * This Authorization may be revoked at any time by you in writing, except if your health information has already been used or disclosed.  * Your health information that will be used or disclosed as a result of you signing this authorization could be re-disclosed by the recipient. If this occurs, your re-disclosed health information may no longer be protected by State or Federal laws.  * You may refuse to sign this Authorization. Your refusal will not affect your ability to obtain treatment.  * This Authorization becomes effective upon signing and will  on (date) __________.      If no date is indicated, this Authorization will  one (1)  year from the signature date.    Name: Adrián Sheldon  Signature: Date:   1/2/2024     PLEASE FAX REQUESTED RECORDS BACK TO: [phone]

## 2024-01-26 ENCOUNTER — HOSPITAL ENCOUNTER (OUTPATIENT)
Dept: LAB | Facility: MEDICAL CENTER | Age: 69
End: 2024-01-26
Attending: NURSE PRACTITIONER
Payer: COMMERCIAL

## 2024-01-26 ENCOUNTER — OFFICE VISIT (OUTPATIENT)
Dept: MEDICAL GROUP | Facility: PHYSICIAN GROUP | Age: 69
End: 2024-01-26
Payer: COMMERCIAL

## 2024-01-26 VITALS
WEIGHT: 166 LBS | TEMPERATURE: 96.6 F | SYSTOLIC BLOOD PRESSURE: 126 MMHG | HEIGHT: 68 IN | RESPIRATION RATE: 16 BRPM | HEART RATE: 65 BPM | OXYGEN SATURATION: 98 % | DIASTOLIC BLOOD PRESSURE: 78 MMHG | BODY MASS INDEX: 25.16 KG/M2

## 2024-01-26 DIAGNOSIS — R06.02 SOBOE (SHORTNESS OF BREATH ON EXERTION): ICD-10-CM

## 2024-01-26 DIAGNOSIS — E11.65 TYPE 2 DIABETES MELLITUS WITH HYPERGLYCEMIA, WITHOUT LONG-TERM CURRENT USE OF INSULIN (HCC): ICD-10-CM

## 2024-01-26 DIAGNOSIS — Z11.59 NEED FOR HEPATITIS C SCREENING TEST: ICD-10-CM

## 2024-01-26 DIAGNOSIS — E55.9 VITAMIN D DEFICIENCY: ICD-10-CM

## 2024-01-26 DIAGNOSIS — K21.9 GASTROESOPHAGEAL REFLUX DISEASE WITHOUT ESOPHAGITIS: ICD-10-CM

## 2024-01-26 LAB
HCV AB SER QL: NORMAL
PSA SERPL-MCNC: 1.72 NG/ML (ref 0–4)

## 2024-01-26 PROCEDURE — 3078F DIAST BP <80 MM HG: CPT | Performed by: NURSE PRACTITIONER

## 2024-01-26 PROCEDURE — 99214 OFFICE O/P EST MOD 30 MIN: CPT | Performed by: NURSE PRACTITIONER

## 2024-01-26 PROCEDURE — 36415 COLL VENOUS BLD VENIPUNCTURE: CPT

## 2024-01-26 PROCEDURE — 3074F SYST BP LT 130 MM HG: CPT | Performed by: NURSE PRACTITIONER

## 2024-01-26 PROCEDURE — 84153 ASSAY OF PSA TOTAL: CPT

## 2024-01-26 PROCEDURE — 86803 HEPATITIS C AB TEST: CPT

## 2024-01-26 RX ORDER — METFORMIN HYDROCHLORIDE 500 MG/1
TABLET, EXTENDED RELEASE ORAL
Qty: 187 TABLET | Refills: 0 | Status: SHIPPED | OUTPATIENT
Start: 2024-01-26 | End: 2024-04-25

## 2024-01-26 RX ORDER — ERGOCALCIFEROL 1.25 MG/1
50000 CAPSULE ORAL
Qty: 12 CAPSULE | Refills: 3 | Status: SHIPPED | OUTPATIENT
Start: 2024-01-26

## 2024-01-26 RX ORDER — ESOMEPRAZOLE MAGNESIUM 40 MG/1
40 CAPSULE, DELAYED RELEASE ORAL
Qty: 180 CAPSULE | Refills: 0 | Status: SHIPPED | OUTPATIENT
Start: 2024-01-26

## 2024-01-26 ASSESSMENT — FIBROSIS 4 INDEX: FIB4 SCORE: 2.078698548207745214

## 2024-01-26 ASSESSMENT — ENCOUNTER SYMPTOMS
HEADACHES: 0
DIARRHEA: 0
WEIGHT LOSS: 0
FEVER: 0
CONSTIPATION: 0
SHORTNESS OF BREATH: 1
VOMITING: 0
ABDOMINAL PAIN: 0
HEARTBURN: 1
NAUSEA: 0
PALPITATIONS: 0
DIZZINESS: 0
CHILLS: 0

## 2024-01-27 NOTE — PROGRESS NOTES
Chief Complaint   Patient presents with    Follow-Up     On medication      Subjective:     Adrián Sheldon is a 68 y.o. male presenting for      Problem   Soboe (Shortness of Breath On Exertion)    Notes when walking a long distance or when doing physical work or in the cold   States this is new compared to his asthma or copd flares  States it is a sensation of burning and has worsened since he has stopped taking his GERD medicaition              Review of Systems   Constitutional:  Negative for chills, fever, malaise/fatigue and weight loss.   Respiratory:  Positive for shortness of breath.    Cardiovascular:  Negative for chest pain and palpitations.   Gastrointestinal:  Positive for heartburn. Negative for abdominal pain, constipation, diarrhea, nausea and vomiting.   Neurological:  Negative for dizziness and headaches.          Current Outpatient Medications:     vitamin D2, Ergocalciferol, (DRISDOL) 1.25 MG (71268 UT) Cap capsule, Take 1 Capsule by mouth every 7 days., Disp: 12 Capsule, Rfl: 3    metFORMIN ER (GLUCOPHAGE XR) 500 MG TABLET SR 24 HR, Take 1 Tablet by mouth every day for 7 days, THEN 1 Tablet every day for 83 days., Disp: 187 Tablet, Rfl: 0    esomeprazole (NEXIUM) 40 MG delayed-release capsule, Take 1 Capsule by mouth every morning before breakfast., Disp: 180 Capsule, Rfl: 0    albuterol (PROVENTIL) 2.5mg/3ml Nebu Soln solution for nebulization, 3 mL inhaled every 6 hours, PRN SOB for 30 day(s), Disp: , Rfl:     amlodipine-benazepril (LOTREL) 5-20 MG per capsule, TAKE ONE CAPSULE BY MOUTH EVERY DAY, Disp: 90 Capsule, Rfl: 3    albuterol 108 (90 Base) MCG/ACT Aero Soln inhalation aerosol, Inhale 2 Puffs every 6 hours as needed for Shortness of Breath., Disp: 1 Each, Rfl: 2    aspirin EC (ECOTRIN) 81 MG Tablet Delayed Response, Take 81 mg by mouth every day., Disp: , Rfl:     Past Medical History:   Diagnosis Date    Arthritis     Asthma without status asthmaticus 03/07/2020    Benign prostatic  "hyperplasia with urinary obstruction 3/3/2020    Bronchitis     Carotid artery stenosis, asymptomatic, right 2020    Cataract     Chest pain     Chest tightness     Constipation     COPD (chronic obstructive pulmonary disease) ([facility]) 2020    COPD (chronic obstructive pulmonary disease) ([facility]) 3/7/2020    Diabetes ([facility]) 2023    Frequent urination     [place] measles     H. pylori infection 2023    Hearing difficulty     Heartburn     Hypertension     Incomplete emptying of bladder 3/3/2020    Intercostal pain, R side, musculoskeletal chondritis 2020    Lesion of penis 3/3/2020    Nocturia 3/3/2020    Painful joint     Raised prostate specific antigen 3/3/2020    Shortness of breath     Sweat, sweating, excessive     Vision loss     Wears glasses     Wheezing        Past Surgical History:   Procedure Laterality Date    PROSTATECTOMY, RADICAL RETRO  2022    LAMINOTOMY  2022    L3-L4       Social History     Tobacco Use    Smoking status: Former     Current packs/day: 0.00     Average packs/day: 1 pack/day for 40.0 years (40.0 ttl pk-yrs)     Types: Cigarettes     Start date: 1980     Quit date: 2019     Years since quittin.9     Passive exposure: Past    Smokeless tobacco: Never   Vaping Use    Vaping Use: Never used   Substance Use Topics    Alcohol use: Yes     Comment: moderately     Drug use: No       Family History   Problem Relation Age of Onset    No Known Problems Mother     Heart Attack Father 70    Heart Disease Father     Heart Attack Brother 30    Heart Disease Brother        Patient has no known allergies.    Allergies, past medical history, past surgical history, family history, social history reviewed and updated    Objective:     Vitals: /78 (BP Location: Right arm, Patient Position: Sitting, BP Cuff Size: Adult)   Pulse 65   Temp 35.9 °C (96.6 °F) (Temporal)   Resp 16   Ht 1.727 m (5' 8\")   Wt 75.3 kg (166 lb)   SpO2 98%   BMI 25.24 kg/m² "     Physical Exam  Constitutional:       Appearance: Normal appearance.   HENT:      Head: Normocephalic and atraumatic.   Pulmonary:      Effort: Pulmonary effort is normal.   Musculoskeletal:         General: Normal range of motion.      Cervical back: Normal range of motion.   Neurological:      Mental Status: He is alert and oriented to person, place, and time.   Psychiatric:         Behavior: Behavior normal.         Assessment/Plan:     1. Vitamin D deficiency  Chronic and exacerbated condition   - vitamin D2, Ergocalciferol, (DRISDOL) 1.25 MG (64508 UT) Cap capsule; Take 1 Capsule by mouth every 7 days.  Dispense: 12 Capsule; Refill: 3  - VITAMIN D,25 HYDROXY (DEFICIENCY); Future    2. Type 2 diabetes mellitus with hyperglycemia, without long-term current use of insulin (HCC)  Chronic and exacerbated condition   - metFORMIN ER (GLUCOPHAGE XR) 500 MG TABLET SR 24 HR; Take 1 Tablet by mouth every day for 7 days, THEN 1 Tablet every day for 83 days.  Dispense: 187 Tablet; Refill: 0    3. SOBOE (shortness of breath on exertion)  Undiagnosed new condition   Discussed use of albuterol 2 puffs prior to exerting self and going into cold weather  Using mask in cold weather  Possible worsening of his GERD  Will trial nexium again as he was taking it with food  Provided pt contact information for GI    4. Gastroesophageal reflux disease without esophagitis  See number 3  - esomeprazole (NEXIUM) 40 MG delayed-release capsule; Take 1 Capsule by mouth every morning before breakfast.  Dispense: 180 Capsule; Refill: 0        Discussed with patient possible alternative diagnoses, patient is to take all medications as prescribed.     If symptoms persist FU w/PCP, if symptoms worsen go to emergency room.     If experiencing any side effects from prescribed medications reports to the office immediately or go to emergency room.    Reviewed indication, dosage, usage and potential adverse effects of prescribed medications.      Reviewed risks and benefits of treatment plan. Patient verbalizes understanding of all instruction and verbally agrees to plan.    Discussed plan with the patient, and she agrees to the above.      I personally reviewed prior external notes and test results pertinent to today's visit.        Return in about 3 months (around 4/26/2024) for follow up metformin and Vit D.      Please note that this dictation was created using voice recognition software. I have made every reasonable attempt to correct obvious errors, but I expect that there may be errors of grammar and possibly content that I did not discover before finalizing the note.

## 2024-02-01 ENCOUNTER — TELEPHONE (OUTPATIENT)
Dept: MEDICAL GROUP | Facility: PHYSICIAN GROUP | Age: 69
End: 2024-02-01
Payer: COMMERCIAL

## 2024-02-01 NOTE — TELEPHONE ENCOUNTER
LVM for pt letting him know Hep C came back normal - no infection with hep C  per PCP. For any questions call back [phone]

## 2024-02-01 NOTE — TELEPHONE ENCOUNTER
Pharmacy asked if you can review the instructions for metformin and re-submit if correct I will let pharmacy know.     take 1 Tablet by mouth every day for 7 days, THEN 1 Tablet every day for 83 days.

## 2024-02-29 ENCOUNTER — OFFICE VISIT (OUTPATIENT)
Dept: URGENT CARE | Facility: PHYSICIAN GROUP | Age: 69
End: 2024-02-29
Payer: COMMERCIAL

## 2024-02-29 VITALS
RESPIRATION RATE: 16 BRPM | HEIGHT: 68 IN | SYSTOLIC BLOOD PRESSURE: 130 MMHG | OXYGEN SATURATION: 97 % | DIASTOLIC BLOOD PRESSURE: 82 MMHG | BODY MASS INDEX: 25.31 KG/M2 | TEMPERATURE: 98 F | HEART RATE: 72 BPM | WEIGHT: 167 LBS

## 2024-02-29 DIAGNOSIS — J44.1 COPD EXACERBATION (HCC): ICD-10-CM

## 2024-02-29 DIAGNOSIS — K21.9 GASTROESOPHAGEAL REFLUX DISEASE WITHOUT ESOPHAGITIS: ICD-10-CM

## 2024-02-29 PROCEDURE — 3079F DIAST BP 80-89 MM HG: CPT | Performed by: PHYSICIAN ASSISTANT

## 2024-02-29 PROCEDURE — 3075F SYST BP GE 130 - 139MM HG: CPT | Performed by: PHYSICIAN ASSISTANT

## 2024-02-29 PROCEDURE — 99214 OFFICE O/P EST MOD 30 MIN: CPT | Performed by: PHYSICIAN ASSISTANT

## 2024-02-29 RX ORDER — AZITHROMYCIN 250 MG/1
TABLET, FILM COATED ORAL
Qty: 6 TABLET | Refills: 0 | Status: SHIPPED | OUTPATIENT
Start: 2024-02-29

## 2024-02-29 RX ORDER — PREDNISONE 10 MG/1
40 TABLET ORAL DAILY
Qty: 20 TABLET | Refills: 0 | Status: SHIPPED | OUTPATIENT
Start: 2024-02-29 | End: 2024-03-05

## 2024-02-29 RX ORDER — FLUTICASONE PROPIONATE AND SALMETEROL 250; 50 UG/1; UG/1
POWDER RESPIRATORY (INHALATION)
COMMUNITY
Start: 2024-02-29

## 2024-02-29 ASSESSMENT — ENCOUNTER SYMPTOMS
CONSTIPATION: 0
DIARRHEA: 0
HEADACHES: 0
NAUSEA: 0
CHILLS: 0
COUGH: 1
ABDOMINAL PAIN: 0
SHORTNESS OF BREATH: 1
HEARTBURN: 1
VOMITING: 0
FEVER: 0
EYE PAIN: 0
MYALGIAS: 0
SORE THROAT: 0
SPUTUM PRODUCTION: 1

## 2024-02-29 ASSESSMENT — FIBROSIS 4 INDEX: FIB4 SCORE: 2.078698548207745214

## 2024-02-29 NOTE — PROGRESS NOTES
"Subjective:   Adrián Sheldon is a 68 y.o. male who presents for Shortness of Breath (X 2-3 wks /Hx asthma and COPD)      68-year-old male with a history of of COPD presents complaining of 2 to 3-week history of shortness of breath.  He has not been using his albuterol much.  He is also been noticing increased acid reflux symptoms.  He takes a PPI daily but has not noted significant improvement.  Has an upcoming appointment with a gastroenterologist.  He has not noted any fevers or chills.  He has noted increased sputum production and phlegm    Review of Systems   Constitutional:  Positive for malaise/fatigue. Negative for chills and fever.   HENT:  Positive for congestion. Negative for ear pain and sore throat.    Eyes:  Negative for pain.   Respiratory:  Positive for cough, sputum production and shortness of breath.    Cardiovascular:  Negative for chest pain.   Gastrointestinal:  Positive for heartburn. Negative for abdominal pain, constipation, diarrhea, nausea and vomiting.   Genitourinary:  Negative for dysuria.   Musculoskeletal:  Negative for myalgias.   Skin:  Negative for rash.   Neurological:  Negative for headaches.       Medications, Allergies, and current problem list reviewed today in Epic.     Objective:     /82   Pulse 72   Temp 36.7 °C (98 °F) (Temporal)   Resp 16   Ht 1.727 m (5' 8\")   Wt 75.8 kg (167 lb)   SpO2 97%     Physical Exam  Vitals reviewed.   Constitutional:       Appearance: Normal appearance.   HENT:      Head: Normocephalic and atraumatic.      Right Ear: Tympanic membrane, ear canal and external ear normal.      Left Ear: Tympanic membrane, ear canal and external ear normal.      Nose: Rhinorrhea present.      Mouth/Throat:      Mouth: Mucous membranes are moist.      Pharynx: Oropharynx is clear.   Eyes:      Conjunctiva/sclera: Conjunctivae normal.      Pupils: Pupils are equal, round, and reactive to light.   Cardiovascular:      Rate and Rhythm: Normal rate and " regular rhythm.   Pulmonary:      Effort: Pulmonary effort is normal.      Breath sounds: Wheezing present. No rhonchi or rales.   Musculoskeletal:      Cervical back: Normal range of motion.   Lymphadenopathy:      Cervical: No cervical adenopathy.   Skin:     General: Skin is warm and dry.      Capillary Refill: Capillary refill takes less than 2 seconds.   Neurological:      Mental Status: He is alert and oriented to person, place, and time.         Assessment/Plan:     Diagnosis and associated orders:     1. COPD exacerbation (HCC)  predniSONE (DELTASONE) 10 MG Tab    azithromycin (ZITHROMAX) 250 MG Tab      2. Gastroesophageal reflux disease without esophagitis           Comments/MDM:     Will treat for COPD exacerbation.  He does not have chest pain with a significant malaise or fatigue, low suspicion for cardiac etiology of his symptoms.  I did recommend he take an over-the-counter antihistamine in addition to his symptoms as he has had increased tear production itchy eyes sneezing and congestion.  Could be allergy triggered.  Recommend bronchodilator therapy 2-3 times daily, course of oral glucocorticoids as well as antibiotics.  Recommend he consider taking a famotidine or other H2 blocker in addition to his PPI until he is able to see gastroenterology         Differential diagnosis, natural history, supportive care, and indications for immediate follow-up discussed.    Advised the patient to follow-up with the primary care physician for recheck, reevaluation, and consideration of further management.    Please note that this dictation was created using voice recognition software. I have made a reasonable attempt to correct obvious errors, but I expect that there are errors of grammar and possibly content that I did not discover before finalizing the note.    This note was electronically signed by Triston Figueroa PA-C

## 2024-04-07 ENCOUNTER — APPOINTMENT (OUTPATIENT)
Dept: URGENT CARE | Facility: PHYSICIAN GROUP | Age: 69
End: 2024-04-07
Payer: MEDICARE

## 2024-04-25 DIAGNOSIS — J45.30 MILD PERSISTENT ASTHMA WITHOUT COMPLICATION: ICD-10-CM

## 2024-04-25 DIAGNOSIS — J45.40 MODERATE PERSISTENT ASTHMA WITHOUT STATUS ASTHMATICUS WITHOUT COMPLICATION: ICD-10-CM

## 2024-05-28 DIAGNOSIS — I65.21 CAROTID ARTERY STENOSIS, ASYMPTOMATIC, RIGHT: ICD-10-CM

## 2024-05-28 DIAGNOSIS — E78.49 OTHER HYPERLIPIDEMIA: ICD-10-CM

## 2024-05-28 NOTE — TELEPHONE ENCOUNTER
[facility]    Received request via: Patient    Was the patient seen in the last year in this department? Yes    Does the patient have an active prescription (recently filled or refills available) for medication(s) requested? No    Pharmacy Name:     [id number] - [place], [place] - [address] [[id number]]     Does the patient have correction Plus and need 100 day supply (blood pressure, diabetes and cholesterol meds only)? Patient does not have SCP

## 2024-05-29 RX ORDER — ROSUVASTATIN CALCIUM 20 MG/1
20 TABLET, COATED ORAL
Qty: 90 TABLET | Refills: 3 | OUTPATIENT
Start: 2024-05-29

## 2025-01-27 DIAGNOSIS — I65.21 CAROTID ARTERY STENOSIS, ASYMPTOMATIC, RIGHT: ICD-10-CM

## 2025-01-27 NOTE — PROGRESS NOTES
Michael J Bloch, M.D. (Physician)  Vascular Medicine  Encounter Date: 3/20/2023  Signed  Carotid duplex from [facility] suggest mild to moderate atherosclerotic plaque without hemodynamically significant stenosis  Previously was seen to have moderate stenosis on duplex  We will recheck carotid duplex in 2 years          Orders placed for vascular surveillance imaging.    Request for MA to call patient and remind them to schedule their surveillance vascular imaging.     Stacia Don R.N.   [facility]

## 2025-01-27 NOTE — PROGRESS NOTES
Phone Number Called: [phone]    Call outcome: Spoke to patient regarding message below.    Message: patient says they have been going [facility] for medical needs. Patient says currently they will not be able to go to [facility] due to insurance carrier.  Patient sis currently trying to get their insurance to [facility]. Patient will be out of town for work for the next 2 weeks but will call us back if they are able to change insurances.     Verona WADE , Medical Ass't  [facility]   Phone: [phone]   Fax: [phone]

## 2025-02-26 ENCOUNTER — TELEPHONE (OUTPATIENT)
Dept: VASCULAR LAB | Facility: MEDICAL CENTER | Age: 70
End: 2025-02-26
Payer: MEDICARE

## 2025-02-26 NOTE — TELEPHONE ENCOUNTER
Pt due for surveillance imaging, not yet scheduled. Due in March Carotid US    Request for MA to call patient and remind them to schedule their surveillance vascular imaging. Pt previously was trying to switch insurance carriers to [facility]

## 2025-02-27 NOTE — TELEPHONE ENCOUNTER
Left message for patient to let him know he is due for imaging in March. Provided imaging scheduling phone number on  as well as our office line incase he needs to use a facility outside of [facility].           Claudia Mustafa, Medical Assistant   [facility]   Ph: [phone]  Fx: [phone]

## 2025-06-03 ENCOUNTER — TELEPHONE (OUTPATIENT)
Dept: VASCULAR LAB | Facility: MEDICAL CENTER | Age: 70
End: 2025-06-03
Payer: MEDICARE

## 2025-06-03 NOTE — TELEPHONE ENCOUNTER
Pt due for surveillance Carotid Ultrasound in March 2025. Despite multiple attempts to reach pt imaging has not been completed. Letter drafted and sent to pt via certified mail.